# Patient Record
Sex: MALE | Race: WHITE | Employment: OTHER
[De-identification: names, ages, dates, MRNs, and addresses within clinical notes are randomized per-mention and may not be internally consistent; named-entity substitution may affect disease eponyms.]

---

## 2017-01-24 ENCOUNTER — OFFICE VISIT (OUTPATIENT)
Dept: INTERNAL MEDICINE | Facility: CLINIC | Age: 77
End: 2017-01-24

## 2017-01-24 VITALS
BODY MASS INDEX: 29.58 KG/M2 | SYSTOLIC BLOOD PRESSURE: 110 MMHG | DIASTOLIC BLOOD PRESSURE: 68 MMHG | WEIGHT: 223.2 LBS | HEART RATE: 76 BPM | HEIGHT: 73 IN

## 2017-01-24 DIAGNOSIS — Z23 NEED FOR 23-POLYVALENT PNEUMOCOCCAL POLYSACCHARIDE VACCINE: ICD-10-CM

## 2017-01-24 DIAGNOSIS — K21.9 GASTROESOPHAGEAL REFLUX DISEASE WITHOUT ESOPHAGITIS: Primary | ICD-10-CM

## 2017-01-24 DIAGNOSIS — D22.9 MULTIPLE ATYPICAL NEVI: ICD-10-CM

## 2017-01-24 DIAGNOSIS — J44.9 CHRONIC OBSTRUCTIVE PULMONARY DISEASE, UNSPECIFIED COPD TYPE (HCC): ICD-10-CM

## 2017-01-24 PROCEDURE — 99214 OFFICE O/P EST MOD 30 MIN: CPT | Performed by: NURSE PRACTITIONER

## 2017-01-24 PROCEDURE — G8482 FLU IMMUNIZE ORDER/ADMIN: HCPCS | Performed by: NURSE PRACTITIONER

## 2017-01-24 PROCEDURE — 4040F PNEUMOC VAC/ADMIN/RCVD: CPT | Performed by: NURSE PRACTITIONER

## 2017-01-24 PROCEDURE — 1123F ACP DISCUSS/DSCN MKR DOCD: CPT | Performed by: NURSE PRACTITIONER

## 2017-01-24 PROCEDURE — G8926 SPIRO NO PERF OR DOC: HCPCS | Performed by: NURSE PRACTITIONER

## 2017-01-24 PROCEDURE — 3023F SPIROM DOC REV: CPT | Performed by: NURSE PRACTITIONER

## 2017-01-24 PROCEDURE — 1036F TOBACCO NON-USER: CPT | Performed by: NURSE PRACTITIONER

## 2017-01-24 PROCEDURE — G0009 ADMIN PNEUMOCOCCAL VACCINE: HCPCS | Performed by: NURSE PRACTITIONER

## 2017-01-24 PROCEDURE — G8420 CALC BMI NORM PARAMETERS: HCPCS | Performed by: NURSE PRACTITIONER

## 2017-01-24 PROCEDURE — G8427 DOCREV CUR MEDS BY ELIG CLIN: HCPCS | Performed by: NURSE PRACTITIONER

## 2017-01-24 PROCEDURE — 3017F COLORECTAL CA SCREEN DOC REV: CPT | Performed by: NURSE PRACTITIONER

## 2017-01-24 PROCEDURE — 90732 PPSV23 VACC 2 YRS+ SUBQ/IM: CPT | Performed by: NURSE PRACTITIONER

## 2017-01-24 RX ORDER — ALBUTEROL SULFATE 90 UG/1
2 AEROSOL, METERED RESPIRATORY (INHALATION) EVERY 6 HOURS PRN
Qty: 1 INHALER | Refills: 5 | Status: SHIPPED | OUTPATIENT
Start: 2017-01-24

## 2017-01-24 ASSESSMENT — ENCOUNTER SYMPTOMS
TROUBLE SWALLOWING: 0
ABDOMINAL PAIN: 0
CONSTIPATION: 0
SORE THROAT: 0
NAUSEA: 0
SINUS PRESSURE: 0
GLOBUS SENSATION: 0
COUGH: 1
VOMITING: 0
BLOOD IN STOOL: 0
DIARRHEA: 0
SHORTNESS OF BREATH: 0
WHEEZING: 0

## 2017-01-24 ASSESSMENT — PATIENT HEALTH QUESTIONNAIRE - PHQ9
SUM OF ALL RESPONSES TO PHQ9 QUESTIONS 1 & 2: 0
2. FEELING DOWN, DEPRESSED OR HOPELESS: 0
SUM OF ALL RESPONSES TO PHQ QUESTIONS 1-9: 0
1. LITTLE INTEREST OR PLEASURE IN DOING THINGS: 0

## 2017-02-14 DIAGNOSIS — K21.9 GASTROESOPHAGEAL REFLUX DISEASE, ESOPHAGITIS PRESENCE NOT SPECIFIED: ICD-10-CM

## 2017-02-14 RX ORDER — OMEPRAZOLE 20 MG/1
CAPSULE, DELAYED RELEASE ORAL
Qty: 60 CAPSULE | Refills: 3 | Status: SHIPPED | OUTPATIENT
Start: 2017-02-14 | End: 2017-05-26 | Stop reason: SDUPTHER

## 2017-02-15 ENCOUNTER — OFFICE VISIT (OUTPATIENT)
Dept: INTERNAL MEDICINE | Facility: CLINIC | Age: 77
End: 2017-02-15

## 2017-02-15 VITALS
TEMPERATURE: 98 F | BODY MASS INDEX: 29.55 KG/M2 | DIASTOLIC BLOOD PRESSURE: 86 MMHG | SYSTOLIC BLOOD PRESSURE: 136 MMHG | HEART RATE: 80 BPM | WEIGHT: 223 LBS | HEIGHT: 73 IN | RESPIRATION RATE: 18 BRPM

## 2017-02-15 DIAGNOSIS — J06.9 UPPER RESPIRATORY TRACT INFECTION, UNSPECIFIED TYPE: Primary | ICD-10-CM

## 2017-02-15 PROCEDURE — 4040F PNEUMOC VAC/ADMIN/RCVD: CPT | Performed by: NURSE PRACTITIONER

## 2017-02-15 PROCEDURE — G8427 DOCREV CUR MEDS BY ELIG CLIN: HCPCS | Performed by: NURSE PRACTITIONER

## 2017-02-15 PROCEDURE — 99213 OFFICE O/P EST LOW 20 MIN: CPT | Performed by: NURSE PRACTITIONER

## 2017-02-15 PROCEDURE — 1036F TOBACCO NON-USER: CPT | Performed by: NURSE PRACTITIONER

## 2017-02-15 PROCEDURE — 1123F ACP DISCUSS/DSCN MKR DOCD: CPT | Performed by: NURSE PRACTITIONER

## 2017-02-15 PROCEDURE — G8420 CALC BMI NORM PARAMETERS: HCPCS | Performed by: NURSE PRACTITIONER

## 2017-02-15 PROCEDURE — 3017F COLORECTAL CA SCREEN DOC REV: CPT | Performed by: NURSE PRACTITIONER

## 2017-02-15 PROCEDURE — G8482 FLU IMMUNIZE ORDER/ADMIN: HCPCS | Performed by: NURSE PRACTITIONER

## 2017-02-15 RX ORDER — AZITHROMYCIN 250 MG/1
TABLET, FILM COATED ORAL
Qty: 11 TABLET | Refills: 0 | Status: SHIPPED | OUTPATIENT
Start: 2017-02-15 | End: 2017-02-25

## 2017-02-15 RX ORDER — BENZONATATE 100 MG/1
100 CAPSULE ORAL 3 TIMES DAILY PRN
Qty: 60 CAPSULE | Refills: 0 | Status: SHIPPED | OUTPATIENT
Start: 2017-02-15 | End: 2017-02-22

## 2017-02-15 ASSESSMENT — PATIENT HEALTH QUESTIONNAIRE - PHQ9
SUM OF ALL RESPONSES TO PHQ QUESTIONS 1-9: 0
SUM OF ALL RESPONSES TO PHQ9 QUESTIONS 1 & 2: 0
1. LITTLE INTEREST OR PLEASURE IN DOING THINGS: 0
2. FEELING DOWN, DEPRESSED OR HOPELESS: 0

## 2017-02-15 ASSESSMENT — ENCOUNTER SYMPTOMS
NAUSEA: 0
BACK PAIN: 0
SORE THROAT: 1
DIARRHEA: 1
VOMITING: 0
COUGH: 1
ABDOMINAL PAIN: 0
SHORTNESS OF BREATH: 0
SINUS PAIN: 1
SWOLLEN GLANDS: 0
WHEEZING: 0
RHINORRHEA: 0

## 2017-03-24 ENCOUNTER — HOSPITAL ENCOUNTER (EMERGENCY)
Age: 77
Discharge: HOME | End: 2017-03-24
Attending: EMERGENCY MEDICINE
Payer: MEDICARE

## 2017-03-24 VITALS
WEIGHT: 225 LBS | TEMPERATURE: 97.9 F | HEART RATE: 78 BPM | HEIGHT: 73 IN | OXYGEN SATURATION: 95 % | RESPIRATION RATE: 18 BRPM | BODY MASS INDEX: 29.82 KG/M2 | DIASTOLIC BLOOD PRESSURE: 89 MMHG | SYSTOLIC BLOOD PRESSURE: 151 MMHG

## 2017-03-24 DIAGNOSIS — S05.02XD CORNEAL ABRASION, LEFT, SUBSEQUENT ENCOUNTER: Primary | ICD-10-CM

## 2017-03-24 PROCEDURE — 6370000000 HC RX 637 (ALT 250 FOR IP): Performed by: EMERGENCY MEDICINE

## 2017-03-24 PROCEDURE — 99282 EMERGENCY DEPT VISIT SF MDM: CPT

## 2017-03-24 RX ORDER — TERAZOSIN 1 MG/1
1 CAPSULE ORAL NIGHTLY
COMMUNITY
End: 2017-09-29

## 2017-03-24 RX ORDER — TETRACAINE HYDROCHLORIDE 5 MG/ML
2 SOLUTION OPHTHALMIC ONCE
Status: COMPLETED | OUTPATIENT
Start: 2017-03-24 | End: 2017-03-24

## 2017-03-24 RX ORDER — HYDROCODONE BITARTRATE AND ACETAMINOPHEN 5; 325 MG/1; MG/1
2 TABLET ORAL ONCE
Status: COMPLETED | OUTPATIENT
Start: 2017-03-24 | End: 2017-03-24

## 2017-03-24 RX ORDER — TOBRAMYCIN AND DEXAMETHASONE 3; 1 MG/ML; MG/ML
1 SUSPENSION/ DROPS OPHTHALMIC
COMMUNITY
End: 2017-09-29 | Stop reason: ALTCHOICE

## 2017-03-24 RX ORDER — AZITHROMYCIN 250 MG/1
250 TABLET, FILM COATED ORAL DAILY
COMMUNITY
End: 2017-05-26 | Stop reason: ALTCHOICE

## 2017-03-24 RX ORDER — MULTIVIT WITH MINERALS/LUTEIN
500 TABLET ORAL DAILY
COMMUNITY
End: 2021-07-09

## 2017-03-24 RX ORDER — HYDROCODONE BITARTRATE AND ACETAMINOPHEN 5; 325 MG/1; MG/1
1 TABLET ORAL EVERY 6 HOURS PRN
Qty: 20 TABLET | Refills: 0 | Status: SHIPPED | OUTPATIENT
Start: 2017-03-24 | End: 2017-03-31

## 2017-03-24 RX ADMIN — HYDROCODONE BITARTRATE AND ACETAMINOPHEN 2 TABLET: 5; 325 TABLET ORAL at 01:43

## 2017-03-24 RX ADMIN — HYDROCODONE BITARTRATE AND ACETAMINOPHEN 2 TABLET: 5; 325 TABLET ORAL at 01:42

## 2017-03-24 RX ADMIN — TETRACAINE HYDROCHLORIDE 2 DROP: 5 SOLUTION OPHTHALMIC at 01:16

## 2017-03-24 ASSESSMENT — ENCOUNTER SYMPTOMS
PHOTOPHOBIA: 1
COUGH: 0
SHORTNESS OF BREATH: 0
DIARRHEA: 0
VOMITING: 0
SORE THROAT: 0
EYE PAIN: 1
ABDOMINAL PAIN: 0
NAUSEA: 0
EYE DISCHARGE: 0

## 2017-03-24 ASSESSMENT — PAIN DESCRIPTION - ORIENTATION
ORIENTATION: RIGHT;LEFT
ORIENTATION: RIGHT;LEFT

## 2017-03-24 ASSESSMENT — PAIN SCALES - GENERAL
PAINLEVEL_OUTOF10: 5
PAINLEVEL_OUTOF10: 10
PAINLEVEL_OUTOF10: 5

## 2017-03-24 ASSESSMENT — PAIN DESCRIPTION - LOCATION
LOCATION: EYE
LOCATION: EYE

## 2017-05-26 ENCOUNTER — OFFICE VISIT (OUTPATIENT)
Dept: PRIMARY CARE CLINIC | Age: 77
End: 2017-05-26
Payer: MEDICARE

## 2017-05-26 VITALS
WEIGHT: 224.6 LBS | HEART RATE: 60 BPM | HEIGHT: 73 IN | SYSTOLIC BLOOD PRESSURE: 114 MMHG | DIASTOLIC BLOOD PRESSURE: 68 MMHG | RESPIRATION RATE: 18 BRPM | BODY MASS INDEX: 29.77 KG/M2

## 2017-05-26 DIAGNOSIS — E78.2 MIXED HYPERLIPIDEMIA: ICD-10-CM

## 2017-05-26 DIAGNOSIS — K21.9 GASTROESOPHAGEAL REFLUX DISEASE, ESOPHAGITIS PRESENCE NOT SPECIFIED: Primary | ICD-10-CM

## 2017-05-26 PROCEDURE — 1123F ACP DISCUSS/DSCN MKR DOCD: CPT | Performed by: NURSE PRACTITIONER

## 2017-05-26 PROCEDURE — 4040F PNEUMOC VAC/ADMIN/RCVD: CPT | Performed by: NURSE PRACTITIONER

## 2017-05-26 PROCEDURE — G8427 DOCREV CUR MEDS BY ELIG CLIN: HCPCS | Performed by: NURSE PRACTITIONER

## 2017-05-26 PROCEDURE — G8420 CALC BMI NORM PARAMETERS: HCPCS | Performed by: NURSE PRACTITIONER

## 2017-05-26 PROCEDURE — 99214 OFFICE O/P EST MOD 30 MIN: CPT | Performed by: NURSE PRACTITIONER

## 2017-05-26 PROCEDURE — 3017F COLORECTAL CA SCREEN DOC REV: CPT | Performed by: NURSE PRACTITIONER

## 2017-05-26 PROCEDURE — 1036F TOBACCO NON-USER: CPT | Performed by: NURSE PRACTITIONER

## 2017-05-26 RX ORDER — OMEPRAZOLE 20 MG/1
CAPSULE, DELAYED RELEASE ORAL
Qty: 60 CAPSULE | Refills: 3 | Status: SHIPPED | OUTPATIENT
Start: 2017-05-26 | End: 2019-03-15

## 2017-05-26 ASSESSMENT — ENCOUNTER SYMPTOMS
WHEEZING: 0
CONSTIPATION: 0
TROUBLE SWALLOWING: 0
COUGH: 0
VOMITING: 0
SORE THROAT: 0
SHORTNESS OF BREATH: 0
NAUSEA: 0
DIARRHEA: 0
BLOOD IN STOOL: 0
SINUS PRESSURE: 0
ABDOMINAL PAIN: 0

## 2017-09-29 ENCOUNTER — OFFICE VISIT (OUTPATIENT)
Dept: PRIMARY CARE CLINIC | Age: 77
End: 2017-09-29
Payer: MEDICARE

## 2017-09-29 VITALS
WEIGHT: 229.4 LBS | BODY MASS INDEX: 30.4 KG/M2 | SYSTOLIC BLOOD PRESSURE: 104 MMHG | HEIGHT: 73 IN | HEART RATE: 68 BPM | RESPIRATION RATE: 18 BRPM | DIASTOLIC BLOOD PRESSURE: 70 MMHG

## 2017-09-29 DIAGNOSIS — K21.9 GASTROESOPHAGEAL REFLUX DISEASE WITHOUT ESOPHAGITIS: Primary | ICD-10-CM

## 2017-09-29 DIAGNOSIS — E78.2 MIXED HYPERLIPIDEMIA: ICD-10-CM

## 2017-09-29 DIAGNOSIS — Z23 NEED FOR INFLUENZA VACCINATION: ICD-10-CM

## 2017-09-29 DIAGNOSIS — Z13.0 SCREENING, ANEMIA, DEFICIENCY, IRON: ICD-10-CM

## 2017-09-29 DIAGNOSIS — M25.562 ACUTE PAIN OF LEFT KNEE: ICD-10-CM

## 2017-09-29 PROCEDURE — G0008 ADMIN INFLUENZA VIRUS VAC: HCPCS | Performed by: NURSE PRACTITIONER

## 2017-09-29 PROCEDURE — G8417 CALC BMI ABV UP PARAM F/U: HCPCS | Performed by: NURSE PRACTITIONER

## 2017-09-29 PROCEDURE — 90688 IIV4 VACCINE SPLT 0.5 ML IM: CPT | Performed by: NURSE PRACTITIONER

## 2017-09-29 PROCEDURE — 4040F PNEUMOC VAC/ADMIN/RCVD: CPT | Performed by: NURSE PRACTITIONER

## 2017-09-29 PROCEDURE — G8427 DOCREV CUR MEDS BY ELIG CLIN: HCPCS | Performed by: NURSE PRACTITIONER

## 2017-09-29 PROCEDURE — 1036F TOBACCO NON-USER: CPT | Performed by: NURSE PRACTITIONER

## 2017-09-29 PROCEDURE — 99214 OFFICE O/P EST MOD 30 MIN: CPT | Performed by: NURSE PRACTITIONER

## 2017-09-29 PROCEDURE — 3017F COLORECTAL CA SCREEN DOC REV: CPT | Performed by: NURSE PRACTITIONER

## 2017-09-29 PROCEDURE — 1123F ACP DISCUSS/DSCN MKR DOCD: CPT | Performed by: NURSE PRACTITIONER

## 2017-09-29 ASSESSMENT — ENCOUNTER SYMPTOMS
SINUS PRESSURE: 0
SORE THROAT: 0
BLOOD IN STOOL: 0
COUGH: 0
TROUBLE SWALLOWING: 0
NAUSEA: 0
ABDOMINAL PAIN: 0
DIARRHEA: 0
VOMITING: 0
SHORTNESS OF BREATH: 0
CONSTIPATION: 0
WHEEZING: 0

## 2017-10-19 ENCOUNTER — OFFICE VISIT (OUTPATIENT)
Dept: PRIMARY CARE CLINIC | Age: 77
End: 2017-10-19
Payer: MEDICARE

## 2017-10-19 ENCOUNTER — TELEPHONE (OUTPATIENT)
Dept: PRIMARY CARE CLINIC | Age: 77
End: 2017-10-19

## 2017-10-19 VITALS
HEART RATE: 64 BPM | WEIGHT: 229 LBS | TEMPERATURE: 97.7 F | OXYGEN SATURATION: 95 % | HEIGHT: 73 IN | DIASTOLIC BLOOD PRESSURE: 74 MMHG | SYSTOLIC BLOOD PRESSURE: 116 MMHG | RESPIRATION RATE: 16 BRPM | BODY MASS INDEX: 30.35 KG/M2

## 2017-10-19 DIAGNOSIS — J06.9 ACUTE URI: Primary | ICD-10-CM

## 2017-10-19 PROCEDURE — G8484 FLU IMMUNIZE NO ADMIN: HCPCS | Performed by: INTERNAL MEDICINE

## 2017-10-19 PROCEDURE — 1123F ACP DISCUSS/DSCN MKR DOCD: CPT | Performed by: INTERNAL MEDICINE

## 2017-10-19 PROCEDURE — G8417 CALC BMI ABV UP PARAM F/U: HCPCS | Performed by: INTERNAL MEDICINE

## 2017-10-19 PROCEDURE — 1036F TOBACCO NON-USER: CPT | Performed by: INTERNAL MEDICINE

## 2017-10-19 PROCEDURE — 99213 OFFICE O/P EST LOW 20 MIN: CPT | Performed by: INTERNAL MEDICINE

## 2017-10-19 PROCEDURE — 4040F PNEUMOC VAC/ADMIN/RCVD: CPT | Performed by: INTERNAL MEDICINE

## 2017-10-19 PROCEDURE — 3017F COLORECTAL CA SCREEN DOC REV: CPT | Performed by: INTERNAL MEDICINE

## 2017-10-19 PROCEDURE — G8427 DOCREV CUR MEDS BY ELIG CLIN: HCPCS | Performed by: INTERNAL MEDICINE

## 2017-10-19 RX ORDER — IBUPROFEN 400 MG/1
400 TABLET ORAL 3 TIMES DAILY
Qty: 30 TABLET | Refills: 1 | Status: ON HOLD | OUTPATIENT
Start: 2017-10-19 | End: 2021-08-13

## 2017-10-19 RX ORDER — AZITHROMYCIN 500 MG/1
500 TABLET, FILM COATED ORAL DAILY
Qty: 6 TABLET | Refills: 0 | Status: SHIPPED | OUTPATIENT
Start: 2017-10-19 | End: 2017-10-24

## 2017-10-19 RX ORDER — PREDNISONE 1 MG/1
5 TABLET ORAL DAILY
Qty: 12 TABLET | Refills: 0 | Status: SHIPPED | OUTPATIENT
Start: 2017-10-19 | End: 2018-01-13 | Stop reason: ALTCHOICE

## 2017-10-19 ASSESSMENT — ENCOUNTER SYMPTOMS
ABDOMINAL PAIN: 0
SORE THROAT: 0
EYE ITCHING: 0
EYE REDNESS: 0
EYE PAIN: 0
SINUS PRESSURE: 0
RHINORRHEA: 0
NAUSEA: 0
APNEA: 0
SHORTNESS OF BREATH: 1
VOICE CHANGE: 1
CHOKING: 0
SINUS PAIN: 0
COUGH: 1
WHEEZING: 1
CHEST TIGHTNESS: 0
EYE DISCHARGE: 0

## 2017-10-19 NOTE — PROGRESS NOTES
[facility] PHYSICIANS  [facility] INTERNAL MEDICINE  [address]  [address]  [place] [address]  Dept: [phone]  Dept Fax: [phone]      Shonna David is a 78 y.o. male who presents today for his medical conditions/complaints as noted below. Chief Complaint   Patient presents with    URI     x 7 days    Cough     x7days Greenish phlegm     Congestion     x7days       HPI:     Subjective:     Shonna David is a 78 y.o. male who presents for evaluation of right ear congestion. Symptoms began 7 days ago. Symptoms have been unchanged since that time. Past history is significant for nothing. .              Current Outpatient Prescriptions   Medication Sig Dispense Refill    azithromycin (ZITHROMAX) 500 MG tablet Take 1 tablet by mouth daily for 5 days Take 2 tablets by mouth on day 1, then 1 tablet by mouth daily for days 2-5. 6 tablet 0    predniSONE (DELTASONE) 5 MG tablet Take 1 tablet by mouth daily for 12 days Take one tablet two times a day x 3 days then one tablet once a day x 3 days then one tablet every other day x 3 and discontinue.  12 tablet 0    ibuprofen (ADVIL;MOTRIN) 400 MG tablet Take 1 tablet by mouth 3 times daily 30 tablet 1    omeprazole (PRILOSEC) 20 MG delayed release capsule TAKE 1 CAPSULE TWICE A DAY 60 capsule 3    Ascorbic Acid (VITAMIN C) 250 MG tablet Take 500 mg by mouth daily      albuterol sulfate HFA (PROAIR HFA) 108 (90 BASE) MCG/ACT inhaler Inhale 2 puffs into the lungs every 6 hours as needed for Wheezing 1 Inhaler 5    oxybutynin (DITROPAN-XL) 10 MG extended release tablet Take 1 tablet by mouth daily 30 tablet 5    b complex vitamins capsule Take 1 capsule by mouth daily      Glucos-Chond-Hyal Ac-Ca Fructo (MOVE FREE JOINT HEALTH ADVANCE) TABS Take by mouth      ranitidine (ZANTAC) 150 MG tablet Take 150 mg by mouth daily as needed for Heartburn      aspirin 81 MG tablet Take 81 mg by mouth daily      fluticasone (FLONASE) 50 MCG/ACT nasal spray 1 spray by Nasal route daily      coenzyme Q10 100 MG CAPS capsule Take 100 mg by mouth daily      simvastatin (ZOCOR) 20 MG tablet Take 1 tablet by mouth nightly 90 tablet 3     No current facility-administered medications for this visit. No Known Allergies    Past Medical History:   Diagnosis Date    COPD (chronic obstructive pulmonary disease) (HCC)     Diverticulosis     Gastritis     GERD (gastroesophageal reflux disease)     Hyperlipidemia     Multiple lung nodules     Prostate cancer ([facility] [address]     Seasonal allergies      Past Surgical History:   Procedure Laterality Date    COLONOSCOPY      done about 10 years ago    COLONOSCOPY  11/10/2016    severe diverticulosis     CYST REMOVAL      groin, left arm    ENDOSCOPY, COLON, DIAGNOSTIC      HEMORRHOID SURGERY      LUNG BIOPSY      left side    OTHER SURGICAL HISTORY      prostate seeding    UPPER GASTROINTESTINAL ENDOSCOPY      done about 10 years ago    UPPER GASTROINTESTINAL ENDOSCOPY  11/10/2016    duodenal bulb polyp, MODERATE GASTRITIS      Social History     Social History    Marital status:      Spouse name: N/A    Number of children: N/A    Years of education: N/A     Occupational History    Not on file. Social History Main Topics    Smoking status: Former Smoker     Years: 20.00    Smokeless tobacco: Never Used      Comment: stopped smoking 20 years    Alcohol use Yes      Comment: social    Drug use: No    Sexual activity: Not on file     Other Topics Concern    Not on file     Social History Narrative    No narrative on file     Family History   Problem Relation Age of Onset    Cancer Mother     Cancer Father      colon       Subjective:      Review of Systems   Constitutional: Positive for fatigue. Negative for activity change, appetite change, chills, diaphoresis and fever. HENT: Positive for congestion, ear pain, hearing loss, sneezing and voice change.  Negative for ear discharge, mouth sores, nosebleeds, postnasal drip, rhinorrhea, sinus pain, sinus pressure and sore throat. Eyes: Negative for pain, discharge, redness and itching. Respiratory: Positive for cough, shortness of breath and wheezing. Negative for apnea, choking and chest tightness. Cardiovascular: Negative for chest pain, palpitations and leg swelling. Gastrointestinal: Negative for abdominal pain and nausea. Musculoskeletal: Positive for arthralgias and myalgias. Skin: Negative for rash. Neurological: Negative for dizziness and weakness. Hematological: Negative for adenopathy. Psychiatric/Behavioral: Positive for sleep disturbance. Objective:     /74   Pulse 64   Temp 97.7 °F (36.5 °C)   Resp 16   Ht 6' 1\" (1.854 m)   Wt 229 lb (103.9 kg)   SpO2 95%   BMI 30.21 kg/m²     Physical Exam   Constitutional: He is oriented to person, place, and time. He appears distressed. HENT:   Head: Normocephalic and atraumatic. Right Ear: External ear normal.   Left Ear: External ear normal.   Mouth/Throat: Oropharynx is clear and moist. No oropharyngeal exudate. Eyes: Conjunctivae and EOM are normal. Pupils are equal, round, and reactive to light. No scleral icterus. Neck: Normal range of motion. Neck supple. Cardiovascular: Normal rate, regular rhythm and normal pulses. Pulmonary/Chest: Effort normal. No respiratory distress. He has wheezes. On examination, Inspection of the chest showed the chest wall to be symmetrical and equal in expansion. Palpation did not show any masses or tenderness over the chest wall. Percussion note is normal Auscultation showed scattered wheezes no rales. No pleural friction rub. Abdominal: Soft. He exhibits no distension. There is no tenderness. Musculoskeletal: He exhibits no edema. Neurological: He is alert and oriented to person, place, and time. Gait normal. Coordination normal.   Skin: Skin is warm, dry and intact. No rash noted.    Psychiatric: Mood, memory and affect normal.   Nursing note and vitals reviewed. Assessment:       1. Acute URI  See orders below. Antibiotics, Prednisone, NSAIDS. Plan:       Orders Placed This Encounter   Medications    azithromycin (ZITHROMAX) 500 MG tablet     Sig: Take 1 tablet by mouth daily for 5 days Take 2 tablets by mouth on day 1, then 1 tablet by mouth daily for days 2-5. Dispense:  6 tablet     Refill:  0    predniSONE (DELTASONE) 5 MG tablet     Sig: Take 1 tablet by mouth daily for 12 days Take one tablet two times a day x 3 days then one tablet once a day x 3 days then one tablet every other day x 3 and discontinue. Dispense:  12 tablet     Refill:  0    ibuprofen (ADVIL;MOTRIN) 400 MG tablet     Sig: Take 1 tablet by mouth 3 times daily     Dispense:  30 tablet     Refill:  1     We will see how he does with above. He had questions if 5 days of Rx can really help. He was reassured.        Electronically signed by Aurelio Chan MD on 10/19/2017 at 1:03 PM

## 2017-10-31 ENCOUNTER — OFFICE VISIT (OUTPATIENT)
Dept: PRIMARY CARE CLINIC | Age: 77
End: 2017-10-31
Payer: MEDICARE

## 2017-10-31 VITALS
DIASTOLIC BLOOD PRESSURE: 68 MMHG | TEMPERATURE: 98.1 F | WEIGHT: 224 LBS | OXYGEN SATURATION: 97 % | SYSTOLIC BLOOD PRESSURE: 110 MMHG | HEART RATE: 91 BPM | HEIGHT: 73 IN | RESPIRATION RATE: 16 BRPM | BODY MASS INDEX: 29.69 KG/M2

## 2017-10-31 DIAGNOSIS — R05.9 COUGH: ICD-10-CM

## 2017-10-31 DIAGNOSIS — J20.8 ACUTE BRONCHITIS DUE TO OTHER SPECIFIED ORGANISMS: Primary | ICD-10-CM

## 2017-10-31 LAB
ALBUMIN SERPL-MCNC: 4.4 G/DL
ALP BLD-CCNC: 66 U/L
ALT SERPL-CCNC: 15 U/L
ANION GAP SERPL CALCULATED.3IONS-SCNC: NORMAL MMOL/L
AST SERPL-CCNC: 19 U/L
BASOPHILS ABSOLUTE: NORMAL /ΜL
BASOPHILS RELATIVE PERCENT: NORMAL %
BILIRUB SERPL-MCNC: 1.2 MG/DL (ref 0.1–1.4)
BUN BLDV-MCNC: 13 MG/DL
CALCIUM SERPL-MCNC: 9.3 MG/DL
CHLORIDE BLD-SCNC: 106 MMOL/L
CHOLESTEROL, TOTAL: 204 MG/DL
CHOLESTEROL/HDL RATIO: 5.7
CO2: 25 MMOL/L
CREAT SERPL-MCNC: 0.98 MG/DL
EOSINOPHILS ABSOLUTE: NORMAL /ΜL
EOSINOPHILS RELATIVE PERCENT: NORMAL %
GFR CALCULATED: NORMAL
GLUCOSE BLD-MCNC: 101 MG/DL
HCT VFR BLD CALC: 45.1 % (ref 41–53)
HDLC SERPL-MCNC: 36 MG/DL (ref 35–70)
HEMOGLOBIN: 15.2 G/DL (ref 13.5–17.5)
LDL CHOLESTEROL CALCULATED: 153 MG/DL (ref 0–160)
LYMPHOCYTES ABSOLUTE: NORMAL /ΜL
LYMPHOCYTES RELATIVE PERCENT: NORMAL %
MCH RBC QN AUTO: NORMAL PG
MCHC RBC AUTO-ENTMCNC: NORMAL G/DL
MCV RBC AUTO: NORMAL FL
MONOCYTES ABSOLUTE: NORMAL /ΜL
MONOCYTES RELATIVE PERCENT: NORMAL %
NEUTROPHILS ABSOLUTE: NORMAL /ΜL
NEUTROPHILS RELATIVE PERCENT: NORMAL %
PDW BLD-RTO: NORMAL %
PLATELET # BLD: 222 K/ΜL
PMV BLD AUTO: NORMAL FL
POTASSIUM SERPL-SCNC: 4.3 MMOL/L
RBC # BLD: 5.01 10^6/ΜL
SODIUM BLD-SCNC: 139 MMOL/L
TOTAL PROTEIN: 7.6
TRIGL SERPL-MCNC: 75 MG/DL
VLDLC SERPL CALC-MCNC: NORMAL MG/DL
WBC # BLD: 6.1 10^3/ML

## 2017-10-31 PROCEDURE — 99213 OFFICE O/P EST LOW 20 MIN: CPT | Performed by: INTERNAL MEDICINE

## 2017-10-31 PROCEDURE — G8484 FLU IMMUNIZE NO ADMIN: HCPCS | Performed by: INTERNAL MEDICINE

## 2017-10-31 PROCEDURE — G8417 CALC BMI ABV UP PARAM F/U: HCPCS | Performed by: INTERNAL MEDICINE

## 2017-10-31 PROCEDURE — 3017F COLORECTAL CA SCREEN DOC REV: CPT | Performed by: INTERNAL MEDICINE

## 2017-10-31 PROCEDURE — 4040F PNEUMOC VAC/ADMIN/RCVD: CPT | Performed by: INTERNAL MEDICINE

## 2017-10-31 PROCEDURE — 1036F TOBACCO NON-USER: CPT | Performed by: INTERNAL MEDICINE

## 2017-10-31 PROCEDURE — 1123F ACP DISCUSS/DSCN MKR DOCD: CPT | Performed by: INTERNAL MEDICINE

## 2017-10-31 PROCEDURE — G8427 DOCREV CUR MEDS BY ELIG CLIN: HCPCS | Performed by: INTERNAL MEDICINE

## 2017-10-31 RX ORDER — AMOXICILLIN AND CLAVULANATE POTASSIUM 875; 125 MG/1; MG/1
1 TABLET, FILM COATED ORAL 2 TIMES DAILY
Qty: 20 TABLET | Refills: 0 | Status: SHIPPED | OUTPATIENT
Start: 2017-10-31 | End: 2017-11-10

## 2017-10-31 RX ORDER — BENZONATATE 100 MG/1
100 CAPSULE ORAL 3 TIMES DAILY PRN
Qty: 30 CAPSULE | Refills: 1 | Status: SHIPPED | OUTPATIENT
Start: 2017-10-31 | End: 2019-03-15 | Stop reason: ALTCHOICE

## 2017-11-01 DIAGNOSIS — Z13.0 SCREENING, ANEMIA, DEFICIENCY, IRON: ICD-10-CM

## 2017-11-01 DIAGNOSIS — E78.2 MIXED HYPERLIPIDEMIA: ICD-10-CM

## 2017-11-01 ASSESSMENT — ENCOUNTER SYMPTOMS
EYE REDNESS: 0
TROUBLE SWALLOWING: 0
COUGH: 1
SHORTNESS OF BREATH: 0
EYE DISCHARGE: 0
ABDOMINAL PAIN: 0
SORE THROAT: 1
VOMITING: 0
NAUSEA: 0
BACK PAIN: 0

## 2017-11-01 NOTE — PROGRESS NOTES
[facility] PHYSICIANS  [facility] INTERNAL MEDICINE  [address]  [address]  [place] [address]  Dept: [phone]  Dept Fax: [phone]    Josiah Cooper is a 78 y.o. male who presents today for his medical conditions/complaints as noted below. Josiah Cooper is c/o of   Chief Complaint   Patient presents with    URI     x 4 weeks-was here 10/17/17    Cough     productive-         HPI:     URI    This is a recurrent problem. The current episode started 1 to 4 weeks ago. The problem has been gradually worsening. There has been no fever. Associated symptoms include congestion, coughing and a sore throat. Pertinent negatives include no abdominal pain, chest pain, dysuria, headaches, nausea, rash or vomiting. Treatments tried: zpack. The treatment provided moderate relief. Cough   Associated symptoms include a sore throat. Pertinent negatives include no chest pain, eye redness, fever, headaches, myalgias, postnasal drip, rash or shortness of breath.        No results found for: LABA1C          ( goal A1C is < 7)   No results found for: LABMICR  LDL Calculated (mg/dL)   Date Value   09/28/2016 101       (goal LDL is <100)   No results found for: AST, ALT, BUN  BP Readings from Last 3 Encounters:   10/31/17 110/68   10/19/17 116/74   09/29/17 104/70          (goal 120/80)    Past Medical History:   Diagnosis Date    COPD (chronic obstructive pulmonary disease) (HCC)     Diverticulosis     Gastritis     GERD (gastroesophageal reflux disease)     Hyperlipidemia     Multiple lung nodules     Prostate cancer ([facility] [address]     Seasonal allergies       Past Surgical History:   Procedure Laterality Date    COLONOSCOPY      done about 10 years ago    COLONOSCOPY  11/10/2016    severe diverticulosis     CYST REMOVAL      groin, left arm    ENDOSCOPY, COLON, DIAGNOSTIC      HEMORRHOID SURGERY      LUNG BIOPSY      left side    OTHER SURGICAL HISTORY      prostate seeding    UPPER GASTROINTESTINAL ENDOSCOPY      done about 10 years ago    UPPER GASTROINTESTINAL ENDOSCOPY  11/10/2016    duodenal bulb polyp, MODERATE GASTRITIS        Family History   Problem Relation Age of Onset    Cancer Mother     Cancer Father      colon       Social History   Substance Use Topics    Smoking status: Former Smoker     Years: 20.00    Smokeless tobacco: Never Used      Comment: stopped smoking 20 years    Alcohol use Yes      Comment: social      Current Outpatient Prescriptions   Medication Sig Dispense Refill    amoxicillin-clavulanate (AUGMENTIN) 875-125 MG per tablet Take 1 tablet by mouth 2 times daily for 10 days 20 tablet 0    benzonatate (TESSALON PERLES) 100 MG capsule Take 1 capsule by mouth 3 times daily as needed for Cough 30 capsule 1    predniSONE (DELTASONE) 5 MG tablet Take 1 tablet by mouth daily for 12 days Take one tablet two times a day x 3 days then one tablet once a day x 3 days then one tablet every other day x 3 and discontinue.  12 tablet 0    ibuprofen (ADVIL;MOTRIN) 400 MG tablet Take 1 tablet by mouth 3 times daily 30 tablet 1    omeprazole (PRILOSEC) 20 MG delayed release capsule TAKE 1 CAPSULE TWICE A DAY 60 capsule 3    Ascorbic Acid (VITAMIN C) 250 MG tablet Take 500 mg by mouth daily      albuterol sulfate HFA (PROAIR HFA) 108 (90 BASE) MCG/ACT inhaler Inhale 2 puffs into the lungs every 6 hours as needed for Wheezing 1 Inhaler 5    oxybutynin (DITROPAN-XL) 10 MG extended release tablet Take 1 tablet by mouth daily 30 tablet 5    b complex vitamins capsule Take 1 capsule by mouth daily      Glucos-Chond-Hyal Ac-Ca Fructo (MOVE FREE [facility] HEALTH ADVANCE) TABS Take by mouth      ranitidine (ZANTAC) 150 MG tablet Take 150 mg by mouth daily as needed for Heartburn      aspirin 81 MG tablet Take 81 mg by mouth daily      fluticasone (FLONASE) 50 MCG/ACT nasal spray 1 spray by Nasal route daily      coenzyme Q10 100 MG CAPS capsule Take 100 mg by mouth daily      simvastatin (ZOCOR) 20 MG tablet Take 1 tablet by mouth nightly 90 tablet 3     No current facility-administered medications for this visit. No Known Allergies    Health Maintenance   Topic Date Due    AAA screen  1940    DTaP/Tdap/Td vaccine (1 - Tdap) 11/05/1961    Pneumococcal low/med risk (2 of 2 - PCV13) 01/24/2018    Lipid screen  09/28/2021    Colon cancer screen colonoscopy  11/09/2021    Zostavax vaccine  Completed    Flu vaccine  Completed       Subjective:      Review of Systems   Constitutional: Negative for activity change, appetite change, fatigue, fever and unexpected weight change. HENT: Positive for congestion and sore throat. Negative for postnasal drip and trouble swallowing. Eyes: Negative for discharge and redness. Respiratory: Positive for cough. Negative for shortness of breath. Cardiovascular: Negative for chest pain and palpitations. Gastrointestinal: Negative for abdominal pain, nausea and vomiting. Endocrine: Negative for cold intolerance and heat intolerance. Genitourinary: Negative for difficulty urinating and dysuria. Musculoskeletal: Negative for arthralgias, back pain and myalgias. Skin: Negative for rash and wound. Neurological: Negative for dizziness and headaches. Hematological: Negative for adenopathy. Psychiatric/Behavioral: Negative for behavioral problems. The patient is not nervous/anxious. Objective:     Physical Exam   Constitutional: He is oriented to person, place, and time. He appears well-developed and well-nourished. No distress. HENT:   Head: Normocephalic and atraumatic. Right Ear: External ear normal.   Left Ear: External ear normal.   Nose: Nose normal.   Mouth/Throat: Oropharynx is clear and moist. No oropharyngeal exudate. Eyes: Conjunctivae are normal. Right eye exhibits no discharge. Left eye exhibits no discharge. No scleral icterus. Neck: Neck supple.    Cardiovascular: Normal rate, regular rhythm and normal heart

## 2017-11-02 ENCOUNTER — TELEPHONE (OUTPATIENT)
Dept: PRIMARY CARE CLINIC | Age: 77
End: 2017-11-02

## 2017-11-02 NOTE — TELEPHONE ENCOUNTER
----- Message from Yamilet Hawley CNP sent at 11/1/2017  3:52 PM EDT -----  Please let patient know all labs WNL thanks

## 2017-12-26 ENCOUNTER — OFFICE VISIT (OUTPATIENT)
Dept: PRIMARY CARE CLINIC | Age: 77
End: 2017-12-26
Payer: MEDICARE

## 2017-12-26 VITALS
TEMPERATURE: 97.5 F | DIASTOLIC BLOOD PRESSURE: 62 MMHG | SYSTOLIC BLOOD PRESSURE: 112 MMHG | WEIGHT: 224 LBS | BODY MASS INDEX: 29.55 KG/M2

## 2017-12-26 DIAGNOSIS — J06.9 VIRAL URI: Primary | ICD-10-CM

## 2017-12-26 PROCEDURE — 99213 OFFICE O/P EST LOW 20 MIN: CPT | Performed by: NURSE PRACTITIONER

## 2017-12-26 RX ORDER — AZITHROMYCIN 250 MG/1
TABLET, FILM COATED ORAL
Qty: 1 PACKET | Refills: 0 | Status: SHIPPED | OUTPATIENT
Start: 2017-12-26 | End: 2018-01-05

## 2017-12-26 ASSESSMENT — ENCOUNTER SYMPTOMS
VOMITING: 0
NAUSEA: 0
SORE THROAT: 0
WHEEZING: 0
ABDOMINAL PAIN: 0
COUGH: 1
SINUS PAIN: 0
SINUS PRESSURE: 0
CHEST TIGHTNESS: 0
DIARRHEA: 0
RHINORRHEA: 1

## 2017-12-26 NOTE — PROGRESS NOTES
of breath and wheezing. Cardiovascular: Negative for chest pain and palpitations. Gastrointestinal: Negative for abdominal pain, diarrhea, nausea and vomiting. Musculoskeletal: Negative for neck pain. Skin: Negative for rash. Neurological: Negative for dizziness, facial asymmetry and light-headedness. Objective:     Physical Exam   Constitutional: He appears well-developed and well-nourished. No distress. HENT:   Head: Normocephalic. Right Ear: Tympanic membrane, external ear and ear canal normal.   Left Ear: Tympanic membrane, external ear and ear canal normal.   Nose: Rhinorrhea present. Right sinus exhibits no maxillary sinus tenderness and no frontal sinus tenderness. Left sinus exhibits no maxillary sinus tenderness and no frontal sinus tenderness. Mouth/Throat: Uvula is midline, oropharynx is clear and moist and mucous membranes are normal.   Cardiovascular: Normal rate, regular rhythm and normal heart sounds. Pulmonary/Chest: Effort normal and breath sounds normal. No respiratory distress. He has no wheezes. Skin: He is not diaphoretic. Nursing note and vitals reviewed. /62 (Site: Left Arm, Position: Sitting, Cuff Size: Large Adult)   Temp 97.5 °F (36.4 °C) (Oral)   Wt 224 lb (101.6 kg)   BMI 29.55 kg/m²     Assessment:      1. Viral URI  azithromycin (ZITHROMAX) 250 MG tablet     Plan:      Return if symptoms worsen or fail to improve. Orders Placed This Encounter   Medications    azithromycin (ZITHROMAX) 250 MG tablet     Sig: Take 2 tabs (500 mg) on Day 1, and take 1 tab (250 mg) on days 2 through 5. Dispense:  1 packet     Refill:  0     Given the patient's presenting symptoms I feel this is most likely a virus. Patient is demanding that an antibiotic needs to be prescribed. Great deal of time was spent discussing viral illness versus bacterial illness and use of antibiotics with a viral illness discussed with patient. Patient verbalizes understanding.

## 2017-12-28 ENCOUNTER — OFFICE VISIT (OUTPATIENT)
Dept: PRIMARY CARE CLINIC | Age: 77
End: 2017-12-28
Payer: MEDICARE

## 2017-12-28 ENCOUNTER — HOSPITAL ENCOUNTER (OUTPATIENT)
Dept: GENERAL RADIOLOGY | Age: 77
Discharge: HOME | End: 2017-12-28
Payer: MEDICARE

## 2017-12-28 ENCOUNTER — HOSPITAL ENCOUNTER (OUTPATIENT)
Age: 77
Discharge: HOME | End: 2017-12-28
Payer: MEDICARE

## 2017-12-28 VITALS
RESPIRATION RATE: 18 BRPM | SYSTOLIC BLOOD PRESSURE: 108 MMHG | DIASTOLIC BLOOD PRESSURE: 70 MMHG | TEMPERATURE: 98.4 F | WEIGHT: 232.4 LBS | HEART RATE: 68 BPM | BODY MASS INDEX: 30.8 KG/M2 | HEIGHT: 73 IN

## 2017-12-28 DIAGNOSIS — J44.9 CHRONIC OBSTRUCTIVE PULMONARY DISEASE, UNSPECIFIED COPD TYPE (HCC): ICD-10-CM

## 2017-12-28 DIAGNOSIS — R05.3 PERSISTENT COUGH: ICD-10-CM

## 2017-12-28 DIAGNOSIS — Z91.09 ENVIRONMENTAL ALLERGIES: ICD-10-CM

## 2017-12-28 DIAGNOSIS — J01.81 OTHER ACUTE RECURRENT SINUSITIS: Primary | ICD-10-CM

## 2017-12-28 DIAGNOSIS — M25.562 ACUTE PAIN OF LEFT KNEE: ICD-10-CM

## 2017-12-28 PROCEDURE — 1123F ACP DISCUSS/DSCN MKR DOCD: CPT | Performed by: NURSE PRACTITIONER

## 2017-12-28 PROCEDURE — G8417 CALC BMI ABV UP PARAM F/U: HCPCS | Performed by: NURSE PRACTITIONER

## 2017-12-28 PROCEDURE — 4040F PNEUMOC VAC/ADMIN/RCVD: CPT | Performed by: NURSE PRACTITIONER

## 2017-12-28 PROCEDURE — G8484 FLU IMMUNIZE NO ADMIN: HCPCS | Performed by: NURSE PRACTITIONER

## 2017-12-28 PROCEDURE — 99214 OFFICE O/P EST MOD 30 MIN: CPT | Performed by: NURSE PRACTITIONER

## 2017-12-28 PROCEDURE — G8427 DOCREV CUR MEDS BY ELIG CLIN: HCPCS | Performed by: NURSE PRACTITIONER

## 2017-12-28 PROCEDURE — G8926 SPIRO NO PERF OR DOC: HCPCS | Performed by: NURSE PRACTITIONER

## 2017-12-28 PROCEDURE — 3017F COLORECTAL CA SCREEN DOC REV: CPT | Performed by: NURSE PRACTITIONER

## 2017-12-28 PROCEDURE — 73560 X-RAY EXAM OF KNEE 1 OR 2: CPT

## 2017-12-28 PROCEDURE — 71020 XR CHEST STANDARD TWO VW: CPT

## 2017-12-28 PROCEDURE — 3023F SPIROM DOC REV: CPT | Performed by: NURSE PRACTITIONER

## 2017-12-28 PROCEDURE — 1036F TOBACCO NON-USER: CPT | Performed by: NURSE PRACTITIONER

## 2017-12-28 RX ORDER — CETIRIZINE HYDROCHLORIDE 10 MG/1
10 TABLET ORAL DAILY
Qty: 30 TABLET | Refills: 1 | Status: SHIPPED | OUTPATIENT
Start: 2017-12-28 | End: 2019-03-15 | Stop reason: ALTCHOICE

## 2017-12-28 RX ORDER — BUDESONIDE AND FORMOTEROL FUMARATE DIHYDRATE 80; 4.5 UG/1; UG/1
2 AEROSOL RESPIRATORY (INHALATION) 2 TIMES DAILY
Qty: 1 INHALER | Refills: 3 | Status: SHIPPED | OUTPATIENT
Start: 2017-12-28 | End: 2019-03-15 | Stop reason: ALTCHOICE

## 2017-12-28 ASSESSMENT — ENCOUNTER SYMPTOMS
SHORTNESS OF BREATH: 0
SINUS PAIN: 0
SINUS PRESSURE: 0
COUGH: 1
HOARSE VOICE: 0
RHINORRHEA: 1

## 2017-12-28 NOTE — PROGRESS NOTES
capsule 3    Ascorbic Acid (VITAMIN C) 250 MG tablet Take 500 mg by mouth daily      albuterol sulfate HFA (PROAIR HFA) 108 (90 BASE) MCG/ACT inhaler Inhale 2 puffs into the lungs every 6 hours as needed for Wheezing 1 Inhaler 5    oxybutynin (DITROPAN-XL) 10 MG extended release tablet Take 1 tablet by mouth daily 30 tablet 5    b complex vitamins capsule Take 1 capsule by mouth daily      Glucos-Chond-Hyal Ac-Ca Fructo ([place]) TABS Take by mouth      ranitidine (ZANTAC) 150 MG tablet Take 150 mg by mouth daily as needed for Heartburn      aspirin 81 MG tablet Take 81 mg by mouth daily      fluticasone (FLONASE) 50 MCG/ACT nasal spray 1 spray by Nasal route daily      coenzyme Q10 100 MG CAPS capsule Take 100 mg by mouth daily      simvastatin (ZOCOR) 20 MG tablet Take 1 tablet by mouth nightly 90 tablet 3    predniSONE (DELTASONE) 5 MG tablet Take 1 tablet by mouth daily for 12 days Take one tablet two times a day x 3 days then one tablet once a day x 3 days then one tablet every other day x 3 and discontinue. 12 tablet 0     No current facility-administered medications for this visit. No Known Allergies    Health Maintenance   Topic Date Due    AAA screen  1940    DTaP/Tdap/Td vaccine (1 - Tdap) 11/05/1961    Pneumococcal low/med risk (2 of 2 - PCV13) 01/24/2018    Colon cancer screen colonoscopy  11/09/2021    Lipid screen  10/31/2022    Zostavax vaccine  Completed    Flu vaccine  Completed       Subjective:      Review of Systems   HENT: Positive for congestion, postnasal drip and rhinorrhea. Negative for ear pain, hoarse voice, sinus pain and sinus pressure. Respiratory: Positive for cough. Musculoskeletal: Positive for arthralgias and myalgias. Left knee pain   Neurological: Negative for headaches. Objective:     Physical Exam   Constitutional: He is oriented to person, place, and time. He appears well-developed and well-nourished. HENT:   Head: Normocephalic. Right Ear: Tympanic membrane is not erythematous. No middle ear effusion. Left Ear: Tympanic membrane is not erythematous. No middle ear effusion. Nose: Rhinorrhea present. Right sinus exhibits no maxillary sinus tenderness and no frontal sinus tenderness. Left sinus exhibits no maxillary sinus tenderness and no frontal sinus tenderness. Mouth/Throat: No posterior oropharyngeal erythema. Eyes: Conjunctivae and EOM are normal. Pupils are equal, round, and reactive to light. Neck: Normal range of motion. Cardiovascular: Normal rate, regular rhythm, normal heart sounds and intact distal pulses. No murmur heard. Pulmonary/Chest: Effort normal and breath sounds normal. He has no wheezes. Abdominal: Soft. Bowel sounds are normal. He exhibits no distension. Musculoskeletal: Normal range of motion. Left knee: He exhibits swelling and bony tenderness. He exhibits normal range of motion. Mild swelling, tenderness with palpation   Neurological: He is alert and oriented to person, place, and time. Skin: Skin is warm and dry. Psychiatric: He has a normal mood and affect. His behavior is normal. Judgment and thought content normal.     /70 (Site: Left Arm, Position: Sitting, Cuff Size: Large Adult)   Pulse 68   Temp 98.4 °F (36.9 °C)   Resp 18   Ht 6' 1\" (1.854 m)   Wt 232 lb 6.4 oz (105.4 kg)   BMI 30.66 kg/m²     Assessment:      1. Other acute recurrent sinusitis  cetirizine (ZYRTEC) 10 MG tablet   2. Chronic obstructive pulmonary disease, unspecified COPD type (HCC)  XR CHEST STANDARD (2 VW)   3. Acute pain of left knee  [facility] - [place]    CANCELED: XR Knee 2 VW Left   4. Persistent cough  XR CHEST STANDARD (2 VW)    cetirizine (ZYRTEC) 10 MG tablet   5. Environmental allergies  cetirizine (ZYRTEC) 10 MG tablet             Plan:      Return if symptoms worsen or fail to improve.     Orders Placed This Encounter

## 2017-12-29 ENCOUNTER — TELEPHONE (OUTPATIENT)
Dept: PRIMARY CARE CLINIC | Age: 77
End: 2017-12-29

## 2017-12-29 NOTE — TELEPHONE ENCOUNTER
----- Message from Eun Garcia CNP sent at 12/28/2017  6:23 PM EST -----  Please let him know that his chest xray shows COPD. He may benefit from using an inhaler on a daily basis so I have sent in rx for him. Let him know this is in addition to his current rescue inhaler.   Also the knee xray shows moderate arthritis with some fluid around the knee cap  Recommend he proceed with PT evaluation, if this does not help the next step would be orthopedic surgeon

## 2018-01-13 ENCOUNTER — HOSPITAL ENCOUNTER (EMERGENCY)
Age: 78
Discharge: HOME | End: 2018-01-13
Attending: SPECIALIST
Payer: MEDICARE

## 2018-01-13 VITALS
RESPIRATION RATE: 18 BRPM | HEART RATE: 87 BPM | SYSTOLIC BLOOD PRESSURE: 145 MMHG | HEIGHT: 73 IN | BODY MASS INDEX: 30.48 KG/M2 | DIASTOLIC BLOOD PRESSURE: 93 MMHG | WEIGHT: 230 LBS | TEMPERATURE: 97.5 F | OXYGEN SATURATION: 95 %

## 2018-01-13 DIAGNOSIS — S61.411A LACERATION OF RIGHT HAND WITHOUT FOREIGN BODY, INITIAL ENCOUNTER: Primary | ICD-10-CM

## 2018-01-13 PROCEDURE — 2500000003 HC RX 250 WO HCPCS: Performed by: PHYSICIAN ASSISTANT

## 2018-01-13 PROCEDURE — 6370000000 HC RX 637 (ALT 250 FOR IP): Performed by: PHYSICIAN ASSISTANT

## 2018-01-13 PROCEDURE — 90715 TDAP VACCINE 7 YRS/> IM: CPT | Performed by: PHYSICIAN ASSISTANT

## 2018-01-13 PROCEDURE — 6360000002 HC RX W HCPCS: Performed by: PHYSICIAN ASSISTANT

## 2018-01-13 PROCEDURE — 90471 IMMUNIZATION ADMIN: CPT | Performed by: PHYSICIAN ASSISTANT

## 2018-01-13 PROCEDURE — 99282 EMERGENCY DEPT VISIT SF MDM: CPT

## 2018-01-13 PROCEDURE — 12001 RPR S/N/AX/GEN/TRNK 2.5CM/<: CPT

## 2018-01-13 RX ORDER — LIDOCAINE HYDROCHLORIDE AND EPINEPHRINE 10; 10 MG/ML; UG/ML
20 INJECTION, SOLUTION INFILTRATION; PERINEURAL ONCE
Status: COMPLETED | OUTPATIENT
Start: 2018-01-13 | End: 2018-01-13

## 2018-01-13 RX ORDER — GINSENG 100 MG
CAPSULE ORAL ONCE
Status: COMPLETED | OUTPATIENT
Start: 2018-01-13 | End: 2018-01-13

## 2018-01-13 RX ADMIN — LIDOCAINE HYDROCHLORIDE,EPINEPHRINE BITARTRATE 20 ML: 10; .01 INJECTION, SOLUTION INFILTRATION; PERINEURAL at 19:25

## 2018-01-13 RX ADMIN — TETANUS TOXOID, REDUCED DIPHTHERIA TOXOID AND ACELLULAR PERTUSSIS VACCINE, ADSORBED 0.5 ML: 5; 2.5; 8; 8; 2.5 SUSPENSION INTRAMUSCULAR at 19:24

## 2018-01-13 RX ADMIN — BACITRACIN: 500 OINTMENT TOPICAL at 20:00

## 2018-01-13 ASSESSMENT — PAIN SCALES - GENERAL: PAINLEVEL_OUTOF10: 0

## 2018-01-14 NOTE — ED PROVIDER NOTES
[facility] ED  [address] [address]  Phone: [phone]  Fax: [phone]      Attending Physician Attestation    I performed a history and physical examination of the patient and discussed management with the mid level provideer. I reviewed the mid level provider's note and agree with the documented findings and plan of care. Any areas of disagreement are noted on the chart. I was personally present for the key portions of any procedures. I have documented in the chart those procedures where I was not present during the key portions. I have reviewed the emergency nurses triage note. I agree with the chief complaint, past medical history, past surgical history, allergies, medications, social and family history as documented unless otherwise noted below. Documentation of the HPI, Physical Exam and Medical Decision Making performed by mid level providers is based on my personal performance of the HPI, PE and MDM. For Physician Assistant/ Nurse Practitioner cases/documentation I have personally evaluated this patient and have completed at least one if not all key elements of the E/M (history, physical exam, and MDM). Additional findings are as noted. CHIEF COMPLAINT       Chief Complaint   Patient presents with    Laceration     right hand         HISTORY OF PRESENT ILLNESS    Sacha Burroughs is a 78 y.o. male who presents To the emergency department with a laceration on his right hand that he sustained on a snowblower. Patient will need a tetanus shot here tonight the right laceration is approximately 1-1/2 cm in length and is on the dorsum of his right hand. PAST MEDICAL HISTORY    has a past medical history of COPD (chronic obstructive pulmonary disease) ([address] Diverticulosis; Gastritis; GERD (gastroesophageal reflux disease); Hyperlipidemia; Multiple lung nodules; Prostate cancer ([address] and Seasonal allergies.     SURGICAL HISTORY      has a past surgical history that includes Hemorrhoid surgery; Lung biopsy; Colonoscopy; Upper gastrointestinal endoscopy; Endoscopy, colon, diagnostic; cyst removal; other surgical history; Colonoscopy (11/10/2016); and Upper gastrointestinal endoscopy (11/10/2016). CURRENT MEDICATIONS       Previous Medications    ALBUTEROL SULFATE HFA (PROAIR HFA) 108 (90 BASE) MCG/ACT INHALER    Inhale 2 puffs into the lungs every 6 hours as needed for Wheezing    ASCORBIC ACID (VITAMIN C) 250 MG TABLET    Take 500 mg by mouth daily    ASPIRIN 81 MG TABLET    Take 81 mg by mouth daily    B COMPLEX VITAMINS CAPSULE    Take 1 capsule by mouth daily    BENZONATATE (TESSALON PERLES) 100 MG CAPSULE    Take 1 capsule by mouth 3 times daily as needed for Cough    BUDESONIDE-FORMOTEROL (SYMBICORT) 80-4.5 MCG/ACT AERO    Inhale 2 puffs into the lungs 2 times daily    CETIRIZINE (ZYRTEC) 10 MG TABLET    Take 1 tablet by mouth daily    COENZYME Q10 100 MG CAPS CAPSULE    Take 100 mg by mouth daily    FLUTICASONE (FLONASE) 50 MCG/ACT NASAL SPRAY    1 spray by Nasal route daily    GLUCOS-CHOND-HYAL AC-CA FRUCTO (MOVE FREE [facility] ADVANCE) TABS    Take by mouth    IBUPROFEN (ADVIL;MOTRIN) 400 MG TABLET    Take 1 tablet by mouth 3 times daily    OMEPRAZOLE (PRILOSEC) 20 MG DELAYED RELEASE CAPSULE    TAKE 1 CAPSULE TWICE A DAY    OXYBUTYNIN (DITROPAN-XL) 10 MG EXTENDED RELEASE TABLET    Take 1 tablet by mouth daily    RANITIDINE (ZANTAC) 150 MG TABLET    Take 150 mg by mouth daily as needed for Heartburn    SIMVASTATIN (ZOCOR) 20 MG TABLET    Take 1 tablet by mouth nightly       ALLERGIES     has No Known Allergies. FAMILY HISTORY     indicated that his mother is . He indicated that his father is . family history includes Cancer in his father and mother. SOCIAL HISTORY      reports that he has quit smoking. He quit after 20.00 years of use. He has never used smokeless tobacco. He reports that he drinks alcohol.  He reports that he does not use drugs. PHYSICAL EXAM     INITIAL VITALS:  height is 6' 1\" (1.854 m) and weight is 104.3 kg (230 lb). His oral temperature is 97.5 °F (36.4 °C). His blood pressure is 145/93 (abnormal) and his pulse is 87. His respiration is 18 and oxygen saturation is 95%. HEENT atraumatic normocephalic  Neck is supple  Lungs are clear  Cardiac regular  Abdomen is soft  Extremities: There is an approximately a 1 and 1/2 cm linear laceration to the dorsum of the right hand which has some bleeding at this point in time we'll need stitches. DIAGNOSTIC RESULTS     EKG: All EKG's are interpreted by the Emergency Department Physician who either signs or Co-signs this chart in the absence of a cardiologist.        RADIOLOGY:   Non-plain film images such as CT, Ultrasound and MRI are read by the radiologist. Plain radiographic images are visualized and the radiologist interpretations are reviewed as follows:         LABS:  No results found for this visit on 01/13/18. EMERGENCY DEPARTMENT COURSE:   Vitals:    Vitals:    01/13/18 1858   BP: (!) 145/93   Pulse: 87   Resp: 18   Temp: 97.5 °F (36.4 °C)   TempSrc: Oral   SpO2: 95%   Weight: 104.3 kg (230 lb)   Height: 6' 1\" (1.854 m)     -------------------------  BP: (!) 145/93, Temp: 97.5 °F (36.4 °C), Pulse: 87, Resp: 18      PERTINENT ATTENDING PHYSICIAN COMMENTS:     The laceration was repaired by Heriberto Lawrence,  physician assistant,  and patient will have his tetanus updated. (Please note that portions of this note were completed with a voice recognition program.  Efforts were made to edit the dictations but occasionally words are mis-transcribed.)    Singh MD, F.A.C.E.P.   Attending Emergency Medicine Physician        Dwayne Kim MD  01/24/18 2485

## 2019-03-15 ENCOUNTER — OFFICE VISIT (OUTPATIENT)
Dept: PRIMARY CARE CLINIC | Age: 79
End: 2019-03-15
Payer: MEDICARE

## 2019-03-15 VITALS
SYSTOLIC BLOOD PRESSURE: 114 MMHG | RESPIRATION RATE: 18 BRPM | WEIGHT: 230.6 LBS | HEART RATE: 68 BPM | DIASTOLIC BLOOD PRESSURE: 66 MMHG | BODY MASS INDEX: 30.56 KG/M2 | HEIGHT: 73 IN

## 2019-03-15 DIAGNOSIS — Z23 NEED FOR PNEUMOCOCCAL VACCINATION: ICD-10-CM

## 2019-03-15 DIAGNOSIS — R20.0 NUMBNESS AND TINGLING OF LEFT UPPER AND LOWER EXTREMITY: ICD-10-CM

## 2019-03-15 DIAGNOSIS — K21.9 GASTROESOPHAGEAL REFLUX DISEASE WITHOUT ESOPHAGITIS: ICD-10-CM

## 2019-03-15 DIAGNOSIS — Z13.0 SCREENING FOR IRON DEFICIENCY ANEMIA: ICD-10-CM

## 2019-03-15 DIAGNOSIS — R20.2 NUMBNESS AND TINGLING OF LEFT UPPER AND LOWER EXTREMITY: ICD-10-CM

## 2019-03-15 DIAGNOSIS — Z85.46 HISTORY OF PROSTATE CANCER: ICD-10-CM

## 2019-03-15 DIAGNOSIS — J44.9 CHRONIC OBSTRUCTIVE PULMONARY DISEASE, UNSPECIFIED COPD TYPE (HCC): Primary | ICD-10-CM

## 2019-03-15 PROCEDURE — G0009 ADMIN PNEUMOCOCCAL VACCINE: HCPCS | Performed by: NURSE PRACTITIONER

## 2019-03-15 PROCEDURE — 90670 PCV13 VACCINE IM: CPT | Performed by: NURSE PRACTITIONER

## 2019-03-15 PROCEDURE — 1123F ACP DISCUSS/DSCN MKR DOCD: CPT | Performed by: NURSE PRACTITIONER

## 2019-03-15 PROCEDURE — 1036F TOBACCO NON-USER: CPT | Performed by: NURSE PRACTITIONER

## 2019-03-15 PROCEDURE — 1101F PT FALLS ASSESS-DOCD LE1/YR: CPT | Performed by: NURSE PRACTITIONER

## 2019-03-15 PROCEDURE — 3023F SPIROM DOC REV: CPT | Performed by: NURSE PRACTITIONER

## 2019-03-15 PROCEDURE — G8417 CALC BMI ABV UP PARAM F/U: HCPCS | Performed by: NURSE PRACTITIONER

## 2019-03-15 PROCEDURE — G8926 SPIRO NO PERF OR DOC: HCPCS | Performed by: NURSE PRACTITIONER

## 2019-03-15 PROCEDURE — G8427 DOCREV CUR MEDS BY ELIG CLIN: HCPCS | Performed by: NURSE PRACTITIONER

## 2019-03-15 PROCEDURE — 99214 OFFICE O/P EST MOD 30 MIN: CPT | Performed by: NURSE PRACTITIONER

## 2019-03-15 PROCEDURE — G8484 FLU IMMUNIZE NO ADMIN: HCPCS | Performed by: NURSE PRACTITIONER

## 2019-03-15 PROCEDURE — 4040F PNEUMOC VAC/ADMIN/RCVD: CPT | Performed by: NURSE PRACTITIONER

## 2019-03-15 ASSESSMENT — ENCOUNTER SYMPTOMS
DIARRHEA: 0
CONSTIPATION: 0
SHORTNESS OF BREATH: 0
WHEEZING: 0
GLOBUS SENSATION: 1
COUGH: 0
HEARTBURN: 1
NAUSEA: 0
BLOOD IN STOOL: 0
ABDOMINAL PAIN: 0
SINUS PRESSURE: 0
TROUBLE SWALLOWING: 0
VOMITING: 0
SORE THROAT: 1

## 2019-03-15 ASSESSMENT — PATIENT HEALTH QUESTIONNAIRE - PHQ9
1. LITTLE INTEREST OR PLEASURE IN DOING THINGS: 0
SUM OF ALL RESPONSES TO PHQ QUESTIONS 1-9: 0
SUM OF ALL RESPONSES TO PHQ9 QUESTIONS 1 & 2: 0
SUM OF ALL RESPONSES TO PHQ QUESTIONS 1-9: 0
2. FEELING DOWN, DEPRESSED OR HOPELESS: 0

## 2019-03-19 DIAGNOSIS — Z13.0 SCREENING FOR IRON DEFICIENCY ANEMIA: ICD-10-CM

## 2019-03-19 DIAGNOSIS — R20.0 NUMBNESS AND TINGLING OF LEFT UPPER AND LOWER EXTREMITY: ICD-10-CM

## 2019-03-19 DIAGNOSIS — R20.2 NUMBNESS AND TINGLING OF LEFT UPPER AND LOWER EXTREMITY: ICD-10-CM

## 2019-05-31 ENCOUNTER — OFFICE VISIT (OUTPATIENT)
Dept: PRIMARY CARE CLINIC | Age: 79
End: 2019-05-31
Payer: MEDICARE

## 2019-05-31 ENCOUNTER — TELEPHONE (OUTPATIENT)
Dept: PRIMARY CARE CLINIC | Age: 79
End: 2019-05-31

## 2019-05-31 VITALS
WEIGHT: 231 LBS | DIASTOLIC BLOOD PRESSURE: 70 MMHG | HEIGHT: 73 IN | HEART RATE: 80 BPM | BODY MASS INDEX: 30.62 KG/M2 | RESPIRATION RATE: 18 BRPM | SYSTOLIC BLOOD PRESSURE: 116 MMHG

## 2019-05-31 DIAGNOSIS — L73.9 FOLLICULITIS: Primary | ICD-10-CM

## 2019-05-31 DIAGNOSIS — J40 BRONCHITIS: ICD-10-CM

## 2019-05-31 PROCEDURE — 1123F ACP DISCUSS/DSCN MKR DOCD: CPT | Performed by: FAMILY MEDICINE

## 2019-05-31 PROCEDURE — 4040F PNEUMOC VAC/ADMIN/RCVD: CPT | Performed by: FAMILY MEDICINE

## 2019-05-31 PROCEDURE — G8417 CALC BMI ABV UP PARAM F/U: HCPCS | Performed by: FAMILY MEDICINE

## 2019-05-31 PROCEDURE — 1036F TOBACCO NON-USER: CPT | Performed by: FAMILY MEDICINE

## 2019-05-31 PROCEDURE — 99214 OFFICE O/P EST MOD 30 MIN: CPT | Performed by: FAMILY MEDICINE

## 2019-05-31 PROCEDURE — G8427 DOCREV CUR MEDS BY ELIG CLIN: HCPCS | Performed by: FAMILY MEDICINE

## 2019-05-31 RX ORDER — AMOXICILLIN AND CLAVULANATE POTASSIUM 875; 125 MG/1; MG/1
1 TABLET, FILM COATED ORAL 2 TIMES DAILY
Qty: 20 TABLET | Refills: 0 | Status: SHIPPED | OUTPATIENT
Start: 2019-05-31 | End: 2019-06-10

## 2019-05-31 ASSESSMENT — ENCOUNTER SYMPTOMS
SHORTNESS OF BREATH: 0
VOMITING: 0
NAUSEA: 0
COUGH: 1
WHEEZING: 0

## 2019-05-31 NOTE — PROGRESS NOTES
[facility]  [address] [place] [address] [address]  [place] [address]  Dept: [phone]  Dept Fax: [phone]    Beatriz Ring is a 70 y.o. male who presents today for his medical conditions/complaints as noted below. Beatriz Ring is c/o of  Chief Complaint   Patient presents with    Other     \"boil\" on Left buttock, noticed last night    Other     right side of chin has a small sore x 2 days       HPI:     HPI   Pt here due to boil he felt on left buttock last night. States felt a little sore when he sat. No drainage or bleeding noted. Denies any fevers or chills, nausea or vomiting. Has scab on right chin after picking at ingrown hair as well. Not draining. States he has been coughing more for past few weeks and has yellow/green sputum and has nasal drainage as well. Has been using flonase and saline which have helped. Denies any sob or wheezing. States zpak does not usually help and longer abx improves his symptoms.       No results found for: LABA1C          ( goal A1C is < 7)   No results found for: LABMICR  LDL Calculated (mg/dL)   Date Value   10/31/2017 153   09/28/2016 101       (goal LDL is <100)   AST (U/L)   Date Value   10/31/2017 19     ALT (U/L)   Date Value   10/31/2017 15     BUN (mg/dL)   Date Value   10/31/2017 13     BP Readings from Last 3 Encounters:   05/31/19 116/70   03/15/19 114/66   01/13/18 (!) 145/93          (goal 120/80)    Past Medical History:   Diagnosis Date    COPD (chronic obstructive pulmonary disease) (HCC)     Diverticulosis     Gastritis     GERD (gastroesophageal reflux disease)     Hyperlipidemia     Multiple lung nodules     Prostate cancer (HCC)     Seasonal allergies       Past Surgical History:   Procedure Laterality Date    COLONOSCOPY      done about 10 years ago    COLONOSCOPY  11/10/2016    severe diverticulosis     CYST REMOVAL      groin, left arm    ENDOSCOPY, COLON, DIAGNOSTIC  HEMORRHOID SURGERY      LUNG BIOPSY      left side    OTHER SURGICAL HISTORY      prostate seeding    UPPER GASTROINTESTINAL ENDOSCOPY      done about 10 years ago    UPPER GASTROINTESTINAL ENDOSCOPY  11/10/2016    duodenal bulb polyp, MODERATE GASTRITIS        Family History   Problem Relation Age of Onset    Cancer Mother     Cancer Father         colon       Social History     Tobacco Use    Smoking status: Former Smoker     Packs/day: 1.00     Years: 20.00     Pack years: 20.00     Last attempt to quit: 1985     Years since quittin.4    Smokeless tobacco: Never Used    Tobacco comment: stopped smoking 20 years   Substance Use Topics    Alcohol use: Yes     Comment: social      Current Outpatient Medications   Medication Sig Dispense Refill    Calcium Carbonate-Vit D-Min (CALCIUM 1200 PO) Take by mouth      Ginger, Zingiber officinalis, (GINGER ROOT PO) Take by mouth      mupirocin (BACTROBAN) 2 % ointment Apply topically 3 times daily for ten days. 1 Tube 0    amoxicillin-clavulanate (AUGMENTIN) 875-125 MG per tablet Take 1 tablet by mouth 2 times daily for 10 days 20 tablet 0    ibuprofen (ADVIL;MOTRIN) 400 MG tablet Take 1 tablet by mouth 3 times daily 30 tablet 1    albuterol sulfate HFA (PROAIR HFA) 108 (90 BASE) MCG/ACT inhaler Inhale 2 puffs into the lungs every 6 hours as needed for Wheezing 1 Inhaler 5    b complex vitamins capsule Take 1 capsule by mouth daily      Glucos-Chond-Hyal Ac-Ca Fructo (MOVE FREE [facility] ADVANCE) TABS Take by mouth      aspirin 81 MG tablet Take 81 mg by mouth daily      coenzyme Q10 100 MG CAPS capsule Take 100 mg by mouth daily      Ascorbic Acid (VITAMIN C) 250 MG tablet Take 500 mg by mouth daily       No current facility-administered medications for this visit.       No Known Allergies    Health Maintenance   Topic Date Due    Shingles Vaccine (2 of 3) 2012    DTaP/Tdap/Td vaccine (2 - Td) 2028    Flu vaccine  Completed    Pneumococcal 65+ years Vaccine  Completed       Subjective:      Review of Systems   Constitutional: Negative for chills and fever. Respiratory: Positive for cough. Negative for shortness of breath and wheezing. Cardiovascular: Negative for chest pain. Gastrointestinal: Negative for nausea and vomiting. Skin:        Small boil on left buttock and bump right chin       Objective:     Physical Exam   Constitutional: He is oriented to person, place, and time. He appears well-developed and well-nourished. No distress. HENT:   Head: Normocephalic. Eyes: Pupils are equal, round, and reactive to light. Neck: Neck supple. Cardiovascular: Normal rate, regular rhythm and normal heart sounds. No murmur heard. Pulmonary/Chest: Effort normal and breath sounds normal. No stridor. No respiratory distress. He has no wheezes. Neurological: He is alert and oriented to person, place, and time. Skin: Skin is warm and dry. He is not diaphoretic. Small pinpoint size boil/pustule on left buttock, no drainage noted. Area of scabbing on R chin, no drainage noted   Psychiatric: He has a normal mood and affect. His behavior is normal.     /70 (Site: Left Upper Arm, Position: Sitting, Cuff Size: Large Adult)   Pulse 80   Resp 18   Ht 6' 1\" (1.854 m)   Wt 231 lb (104.8 kg)   BMI 30.48 kg/m²     Assessment:      1. Folliculitis  - recommend topical ointment to the small area on left buttock. R chin with scab noted , likely from pt picking at chin, recommend pt to monitor it.   - mupirocin (BACTROBAN) 2 % ointment; Apply topically 3 times daily for ten days. Dispense: 1 Tube; Refill: 0    2. Bronchitis  - pt states has mucinex at home, can use that and also continue flonase, saline spray as well. Use inhaler as needed. Discussed if having SOB to call, can send prednisone if needed. - amoxicillin-clavulanate (AUGMENTIN) 875-125 MG per tablet;  Take 1 tablet by mouth 2 times daily for 10 days  Dispense: 20 tablet; Refill: 0       Plan:      Return if symptoms worsen or fail to improve. No orders of the defined types were placed in this encounter. Orders Placed This Encounter   Medications    mupirocin (BACTROBAN) 2 % ointment     Sig: Apply topically 3 times daily for ten days.      Dispense:  1 Tube     Refill:  0    amoxicillin-clavulanate (AUGMENTIN) 875-125 MG per tablet     Sig: Take 1 tablet by mouth 2 times daily for 10 days     Dispense:  20 tablet     Refill:  0            Electronically signed by Cristian Chinchilla DO on 5/31/2019 at 12:39 PM

## 2020-04-20 ENCOUNTER — TELEPHONE (OUTPATIENT)
Dept: ADMINISTRATIVE | Age: 80
End: 2020-04-20

## 2020-05-28 ENCOUNTER — NURSE TRIAGE (OUTPATIENT)
Dept: OTHER | Age: 80
End: 2020-05-28

## 2020-06-23 ENCOUNTER — OFFICE VISIT (OUTPATIENT)
Dept: PRIMARY CARE CLINIC | Age: 80
End: 2020-06-23
Payer: MEDICARE

## 2020-06-23 VITALS
SYSTOLIC BLOOD PRESSURE: 112 MMHG | BODY MASS INDEX: 30.53 KG/M2 | WEIGHT: 231.4 LBS | HEART RATE: 66 BPM | TEMPERATURE: 97.7 F | OXYGEN SATURATION: 97 % | DIASTOLIC BLOOD PRESSURE: 62 MMHG

## 2020-06-23 PROCEDURE — 1036F TOBACCO NON-USER: CPT | Performed by: NURSE PRACTITIONER

## 2020-06-23 PROCEDURE — G8417 CALC BMI ABV UP PARAM F/U: HCPCS | Performed by: NURSE PRACTITIONER

## 2020-06-23 PROCEDURE — 99214 OFFICE O/P EST MOD 30 MIN: CPT | Performed by: NURSE PRACTITIONER

## 2020-06-23 PROCEDURE — 4040F PNEUMOC VAC/ADMIN/RCVD: CPT | Performed by: NURSE PRACTITIONER

## 2020-06-23 PROCEDURE — G8427 DOCREV CUR MEDS BY ELIG CLIN: HCPCS | Performed by: NURSE PRACTITIONER

## 2020-06-23 PROCEDURE — 1123F ACP DISCUSS/DSCN MKR DOCD: CPT | Performed by: NURSE PRACTITIONER

## 2020-06-23 ASSESSMENT — PATIENT HEALTH QUESTIONNAIRE - PHQ9
2. FEELING DOWN, DEPRESSED OR HOPELESS: 0
SUM OF ALL RESPONSES TO PHQ QUESTIONS 1-9: 0
SUM OF ALL RESPONSES TO PHQ QUESTIONS 1-9: 0
SUM OF ALL RESPONSES TO PHQ9 QUESTIONS 1 & 2: 0
1. LITTLE INTEREST OR PLEASURE IN DOING THINGS: 0

## 2020-06-23 ASSESSMENT — ENCOUNTER SYMPTOMS
CONSTIPATION: 0
ABDOMINAL PAIN: 0
DIARRHEA: 0
COUGH: 0
BLOOD IN STOOL: 0
TROUBLE SWALLOWING: 0
SORE THROAT: 0
NAUSEA: 0
SINUS PRESSURE: 0
WHEEZING: 0
VOMITING: 0
SHORTNESS OF BREATH: 0

## 2020-06-23 NOTE — PROGRESS NOTES
No current facility-administered medications for this visit. No Known Allergies    Health Maintenance   Topic Date Due    PSA counseling  11/05/1992    Shingles Vaccine (2 of 3) 07/21/2012    Annual Wellness Visit (AWV)  05/29/2019    Flu vaccine (Season Ended) 09/01/2020    DTaP/Tdap/Td vaccine (2 - Td) 01/13/2028    Pneumococcal 65+ years Vaccine  Completed    Hepatitis A vaccine  Aged Out    Hepatitis B vaccine  Aged Out    Hib vaccine  Aged Out    Meningococcal (ACWY) vaccine  Aged Out       Subjective:      Review of Systems   Constitutional: Negative for activity change, appetite change, chills, fatigue, fever and unexpected weight change. HENT: Negative for congestion, ear pain, hearing loss, sinus pressure, sore throat and trouble swallowing. Eyes: Negative for visual disturbance. Respiratory: Negative for cough, shortness of breath and wheezing. Cardiovascular: Negative for chest pain, palpitations and leg swelling. Gastrointestinal: Negative for abdominal pain, blood in stool, constipation, diarrhea, nausea and vomiting. Endocrine: Negative for cold intolerance, heat intolerance, polydipsia, polyphagia and polyuria. Genitourinary: Negative for difficulty urinating, frequency, hematuria and urgency. Musculoskeletal: Negative for arthralgias and myalgias. Skin: Negative for rash. Allergic/Immunologic: Negative for environmental allergies. Neurological: Negative for dizziness, weakness, light-headedness and headaches. Psychiatric/Behavioral: Negative for confusion. The patient is not nervous/anxious. Objective:     Physical Exam  Constitutional:       Appearance: He is well-developed. HENT:      Head: Normocephalic. Eyes:      Conjunctiva/sclera: Conjunctivae normal.      Pupils: Pupils are equal, round, and reactive to light. Neck:      Musculoskeletal: Normal range of motion. Cardiovascular:      Rate and Rhythm: Normal rate and regular rhythm.

## 2020-07-01 ENCOUNTER — OFFICE VISIT (OUTPATIENT)
Dept: SURGERY | Age: 80
End: 2020-07-01
Payer: MEDICARE

## 2020-07-01 VITALS
HEIGHT: 73 IN | SYSTOLIC BLOOD PRESSURE: 124 MMHG | DIASTOLIC BLOOD PRESSURE: 68 MMHG | BODY MASS INDEX: 30.48 KG/M2 | HEART RATE: 66 BPM | WEIGHT: 230 LBS | OXYGEN SATURATION: 97 %

## 2020-07-01 PROCEDURE — 99203 OFFICE O/P NEW LOW 30 MIN: CPT | Performed by: SURGERY

## 2020-07-01 ASSESSMENT — ENCOUNTER SYMPTOMS
RESPIRATORY NEGATIVE: 1
GASTROINTESTINAL NEGATIVE: 1

## 2020-07-06 ENCOUNTER — TELEPHONE (OUTPATIENT)
Dept: SURGERY | Age: 80
End: 2020-07-06

## 2020-07-06 NOTE — TELEPHONE ENCOUNTER
Left VM for pt to call office. Dr. Ric Joyce will not be available the week that he  Requested for his procedure, so he will have to pick a different day.

## 2020-07-23 ENCOUNTER — HOSPITAL ENCOUNTER (OUTPATIENT)
Dept: PREADMISSION TESTING | Age: 80
Discharge: HOME | End: 2020-07-27
Payer: MEDICARE

## 2020-07-23 VITALS
SYSTOLIC BLOOD PRESSURE: 124 MMHG | TEMPERATURE: 97.4 F | DIASTOLIC BLOOD PRESSURE: 74 MMHG | RESPIRATION RATE: 16 BRPM | WEIGHT: 230 LBS | OXYGEN SATURATION: 97 % | HEART RATE: 61 BPM | BODY MASS INDEX: 30.34 KG/M2

## 2020-07-23 LAB
ABSOLUTE EOS #: 0.2 K/UL (ref 0–0.44)
ABSOLUTE IMMATURE GRANULOCYTE: <0.03 K/UL (ref 0–0.3)
ABSOLUTE LYMPH #: 1 K/UL (ref 1.1–3.7)
ABSOLUTE MONO #: 0.38 K/UL (ref 0.1–1.2)
ANION GAP SERPL CALCULATED.3IONS-SCNC: 12 MMOL/L (ref 9–17)
BASOPHILS # BLD: 1 % (ref 0–2)
BASOPHILS ABSOLUTE: 0.04 K/UL (ref 0–0.2)
BUN BLDV-MCNC: 13 MG/DL (ref 8–23)
BUN/CREAT BLD: ABNORMAL (ref 9–20)
CALCIUM SERPL-MCNC: 8.5 MG/DL (ref 8.6–10.4)
CHLORIDE BLD-SCNC: 108 MMOL/L (ref 98–107)
CO2: 25 MMOL/L (ref 20–31)
CREAT SERPL-MCNC: 0.93 MG/DL (ref 0.7–1.2)
DIFFERENTIAL TYPE: ABNORMAL
EOSINOPHILS RELATIVE PERCENT: 4 % (ref 1–4)
GFR AFRICAN AMERICAN: >60 ML/MIN
GFR NON-AFRICAN AMERICAN: >60 ML/MIN
GFR SERPL CREATININE-BSD FRML MDRD: ABNORMAL ML/MIN/{1.73_M2}
GFR SERPL CREATININE-BSD FRML MDRD: ABNORMAL ML/MIN/{1.73_M2}
GLUCOSE BLD-MCNC: 82 MG/DL (ref 70–99)
HCT VFR BLD CALC: 44 % (ref 40.7–50.3)
HEMOGLOBIN: 14.5 G/DL (ref 13–17)
IMMATURE GRANULOCYTES: 0 %
LYMPHOCYTES # BLD: 20 % (ref 24–43)
MCH RBC QN AUTO: 31 PG (ref 25.2–33.5)
MCHC RBC AUTO-ENTMCNC: 33 G/DL (ref 28.4–34.8)
MCV RBC AUTO: 94 FL (ref 82.6–102.9)
MONOCYTES # BLD: 8 % (ref 3–12)
NRBC AUTOMATED: 0 PER 100 WBC
PDW BLD-RTO: 12.9 % (ref 11.8–14.4)
PLATELET # BLD: 201 K/UL (ref 138–453)
PLATELET ESTIMATE: ABNORMAL
PMV BLD AUTO: 11.6 FL (ref 8.1–13.5)
POTASSIUM SERPL-SCNC: 4.4 MMOL/L (ref 3.7–5.3)
RBC # BLD: 4.68 M/UL (ref 4.21–5.77)
RBC # BLD: ABNORMAL 10*6/UL
SEG NEUTROPHILS: 67 % (ref 36–65)
SEGMENTED NEUTROPHILS ABSOLUTE COUNT: 3.25 K/UL (ref 1.5–8.1)
SODIUM BLD-SCNC: 145 MMOL/L (ref 135–144)
WBC # BLD: 4.9 K/UL (ref 3.5–11.3)
WBC # BLD: ABNORMAL 10*3/UL

## 2020-07-23 PROCEDURE — 85025 COMPLETE CBC W/AUTO DIFF WBC: CPT

## 2020-07-23 PROCEDURE — 80048 BASIC METABOLIC PNL TOTAL CA: CPT

## 2020-07-23 PROCEDURE — 93005 ELECTROCARDIOGRAM TRACING: CPT | Performed by: SURGERY

## 2020-07-23 PROCEDURE — 36415 COLL VENOUS BLD VENIPUNCTURE: CPT

## 2020-07-23 RX ORDER — FLUTICASONE PROPIONATE 50 MCG
1 SPRAY, SUSPENSION (ML) NASAL DAILY PRN
Status: ON HOLD | COMMUNITY
End: 2021-08-13

## 2020-07-23 RX ORDER — TAMSULOSIN HYDROCHLORIDE 0.4 MG/1
0.4 CAPSULE ORAL NIGHTLY
COMMUNITY
End: 2021-07-09 | Stop reason: ALTCHOICE

## 2020-07-24 LAB
EKG ATRIAL RATE: 58 BPM
EKG P AXIS: 44 DEGREES
EKG P-R INTERVAL: 176 MS
EKG Q-T INTERVAL: 418 MS
EKG QRS DURATION: 94 MS
EKG QTC CALCULATION (BAZETT): 410 MS
EKG R AXIS: 44 DEGREES
EKG T AXIS: 38 DEGREES
EKG VENTRICULAR RATE: 58 BPM

## 2020-07-27 ENCOUNTER — ANESTHESIA EVENT (OUTPATIENT)
Dept: OPERATING ROOM | Age: 80
End: 2020-07-27
Payer: MEDICARE

## 2020-07-30 ENCOUNTER — HOSPITAL ENCOUNTER (OUTPATIENT)
Dept: PREADMISSION TESTING | Age: 80
Setting detail: SPECIMEN
Discharge: HOME | End: 2020-08-03
Payer: MEDICARE

## 2020-07-30 PROCEDURE — U0003 INFECTIOUS AGENT DETECTION BY NUCLEIC ACID (DNA OR RNA); SEVERE ACUTE RESPIRATORY SYNDROME CORONAVIRUS 2 (SARS-COV-2) (CORONAVIRUS DISEASE [COVID-19]), AMPLIFIED PROBE TECHNIQUE, MAKING USE OF HIGH THROUGHPUT TECHNOLOGIES AS DESCRIBED BY CMS-2020-01-R: HCPCS

## 2020-07-31 LAB — SARS-COV-2, NAA: NOT DETECTED

## 2020-08-01 ENCOUNTER — TELEPHONE (OUTPATIENT)
Dept: FAMILY MEDICINE CLINIC | Age: 80
End: 2020-08-01

## 2020-08-03 ENCOUNTER — HOSPITAL ENCOUNTER (OUTPATIENT)
Age: 80
Setting detail: OUTPATIENT SURGERY
Discharge: HOME | End: 2020-08-03
Attending: SURGERY | Admitting: SURGERY
Payer: MEDICARE

## 2020-08-03 ENCOUNTER — ANESTHESIA (OUTPATIENT)
Dept: OPERATING ROOM | Age: 80
End: 2020-08-03
Payer: MEDICARE

## 2020-08-03 VITALS
SYSTOLIC BLOOD PRESSURE: 137 MMHG | RESPIRATION RATE: 16 BRPM | TEMPERATURE: 96.1 F | OXYGEN SATURATION: 99 % | DIASTOLIC BLOOD PRESSURE: 79 MMHG

## 2020-08-03 VITALS
DIASTOLIC BLOOD PRESSURE: 65 MMHG | HEART RATE: 57 BPM | WEIGHT: 226.4 LBS | TEMPERATURE: 99 F | SYSTOLIC BLOOD PRESSURE: 116 MMHG | HEIGHT: 73 IN | OXYGEN SATURATION: 97 % | RESPIRATION RATE: 18 BRPM | BODY MASS INDEX: 30 KG/M2

## 2020-08-03 PROCEDURE — 3700000000 HC ANESTHESIA ATTENDED CARE: Performed by: SURGERY

## 2020-08-03 PROCEDURE — 49650 LAP ING HERNIA REPAIR INIT: CPT | Performed by: SURGERY

## 2020-08-03 PROCEDURE — 2709999900 HC NON-CHARGEABLE SUPPLY: Performed by: SURGERY

## 2020-08-03 PROCEDURE — 2580000003 HC RX 258: Performed by: ANESTHESIOLOGY

## 2020-08-03 PROCEDURE — 2500000003 HC RX 250 WO HCPCS: Performed by: ANESTHESIOLOGY

## 2020-08-03 PROCEDURE — 7100000011 HC PHASE II RECOVERY - ADDTL 15 MIN: Performed by: SURGERY

## 2020-08-03 PROCEDURE — S2900 ROBOTIC SURGICAL SYSTEM: HCPCS | Performed by: SURGERY

## 2020-08-03 PROCEDURE — 7100000000 HC PACU RECOVERY - FIRST 15 MIN: Performed by: SURGERY

## 2020-08-03 PROCEDURE — 2500000003 HC RX 250 WO HCPCS: Performed by: NURSE ANESTHETIST, CERTIFIED REGISTERED

## 2020-08-03 PROCEDURE — 3600000019 HC SURGERY ROBOT ADDTL 15MIN: Performed by: SURGERY

## 2020-08-03 PROCEDURE — C1781 MESH (IMPLANTABLE): HCPCS | Performed by: SURGERY

## 2020-08-03 PROCEDURE — 3600000009 HC SURGERY ROBOT BASE: Performed by: SURGERY

## 2020-08-03 PROCEDURE — 6360000002 HC RX W HCPCS: Performed by: NURSE ANESTHETIST, CERTIFIED REGISTERED

## 2020-08-03 PROCEDURE — 64488 TAP BLOCK BI INJECTION: CPT | Performed by: NURSE ANESTHETIST, CERTIFIED REGISTERED

## 2020-08-03 PROCEDURE — 6360000002 HC RX W HCPCS

## 2020-08-03 PROCEDURE — 3700000001 HC ADD 15 MINUTES (ANESTHESIA): Performed by: SURGERY

## 2020-08-03 PROCEDURE — 7100000010 HC PHASE II RECOVERY - FIRST 15 MIN: Performed by: SURGERY

## 2020-08-03 PROCEDURE — 6370000000 HC RX 637 (ALT 250 FOR IP): Performed by: ANESTHESIOLOGY

## 2020-08-03 DEVICE — MESH SURG W3.5XL6IN POLY SELF FIXATING RECT W/ RESRB PLA: Type: IMPLANTABLE DEVICE | Site: INGUINAL | Status: FUNCTIONAL

## 2020-08-03 RX ORDER — ROCURONIUM BROMIDE 10 MG/ML
INJECTION, SOLUTION INTRAVENOUS PRN
Status: DISCONTINUED | OUTPATIENT
Start: 2020-08-03 | End: 2020-08-03 | Stop reason: SDUPTHER

## 2020-08-03 RX ORDER — HYDROCODONE BITARTRATE AND ACETAMINOPHEN 5; 325 MG/1; MG/1
1 TABLET ORAL
Qty: 10 TABLET | Refills: 0 | Status: SHIPPED | OUTPATIENT
Start: 2020-08-03 | End: 2020-08-08

## 2020-08-03 RX ORDER — LABETALOL 20 MG/4 ML (5 MG/ML) INTRAVENOUS SYRINGE
5 EVERY 10 MIN PRN
Status: DISCONTINUED | OUTPATIENT
Start: 2020-08-03 | End: 2020-08-03 | Stop reason: HOSPADM

## 2020-08-03 RX ORDER — NEOSTIGMINE METHYLSULFATE 5 MG/5 ML
SYRINGE (ML) INTRAVENOUS PRN
Status: DISCONTINUED | OUTPATIENT
Start: 2020-08-03 | End: 2020-08-03 | Stop reason: SDUPTHER

## 2020-08-03 RX ORDER — MIDAZOLAM HYDROCHLORIDE 1 MG/ML
2 INJECTION INTRAMUSCULAR; INTRAVENOUS ONCE
Status: COMPLETED | OUTPATIENT
Start: 2020-08-03 | End: 2020-08-03

## 2020-08-03 RX ORDER — FENTANYL CITRATE 50 UG/ML
INJECTION, SOLUTION INTRAMUSCULAR; INTRAVENOUS PRN
Status: DISCONTINUED | OUTPATIENT
Start: 2020-08-03 | End: 2020-08-03 | Stop reason: SDUPTHER

## 2020-08-03 RX ORDER — 0.9 % SODIUM CHLORIDE 0.9 %
500 INTRAVENOUS SOLUTION INTRAVENOUS
Status: DISCONTINUED | OUTPATIENT
Start: 2020-08-03 | End: 2020-08-03 | Stop reason: HOSPADM

## 2020-08-03 RX ORDER — FENTANYL CITRATE 50 UG/ML
100 INJECTION, SOLUTION INTRAMUSCULAR; INTRAVENOUS ONCE
Status: COMPLETED | OUTPATIENT
Start: 2020-08-03 | End: 2020-08-03

## 2020-08-03 RX ORDER — BUPIVACAINE HYDROCHLORIDE 2.5 MG/ML
INJECTION, SOLUTION EPIDURAL; INFILTRATION; INTRACAUDAL
Status: COMPLETED | OUTPATIENT
Start: 2020-08-03 | End: 2020-08-03

## 2020-08-03 RX ORDER — FENTANYL CITRATE 50 UG/ML
INJECTION, SOLUTION INTRAMUSCULAR; INTRAVENOUS
Status: COMPLETED
Start: 2020-08-03 | End: 2020-08-03

## 2020-08-03 RX ORDER — DIPHENHYDRAMINE HYDROCHLORIDE 50 MG/ML
12.5 INJECTION INTRAMUSCULAR; INTRAVENOUS
Status: DISCONTINUED | OUTPATIENT
Start: 2020-08-03 | End: 2020-08-03 | Stop reason: HOSPADM

## 2020-08-03 RX ORDER — SODIUM CHLORIDE, SODIUM LACTATE, POTASSIUM CHLORIDE, CALCIUM CHLORIDE 600; 310; 30; 20 MG/100ML; MG/100ML; MG/100ML; MG/100ML
INJECTION, SOLUTION INTRAVENOUS CONTINUOUS
Status: DISCONTINUED | OUTPATIENT
Start: 2020-08-03 | End: 2020-08-03 | Stop reason: HOSPADM

## 2020-08-03 RX ORDER — MIDAZOLAM HYDROCHLORIDE 1 MG/ML
INJECTION INTRAMUSCULAR; INTRAVENOUS
Status: COMPLETED
Start: 2020-08-03 | End: 2020-08-03

## 2020-08-03 RX ORDER — MORPHINE SULFATE 2 MG/ML
2 INJECTION, SOLUTION INTRAMUSCULAR; INTRAVENOUS EVERY 5 MIN PRN
Status: DISCONTINUED | OUTPATIENT
Start: 2020-08-03 | End: 2020-08-03 | Stop reason: HOSPADM

## 2020-08-03 RX ORDER — SODIUM CHLORIDE 0.9 % (FLUSH) 0.9 %
10 SYRINGE (ML) INJECTION PRN
Status: DISCONTINUED | OUTPATIENT
Start: 2020-08-03 | End: 2020-08-03 | Stop reason: HOSPADM

## 2020-08-03 RX ORDER — MORPHINE SULFATE 2 MG/ML
INJECTION, SOLUTION INTRAMUSCULAR; INTRAVENOUS
Status: COMPLETED
Start: 2020-08-03 | End: 2020-08-03

## 2020-08-03 RX ORDER — SODIUM CHLORIDE 9 MG/ML
INJECTION, SOLUTION INTRAVENOUS CONTINUOUS
Status: DISCONTINUED | OUTPATIENT
Start: 2020-08-03 | End: 2020-08-03 | Stop reason: HOSPADM

## 2020-08-03 RX ORDER — OXYCODONE HYDROCHLORIDE AND ACETAMINOPHEN 5; 325 MG/1; MG/1
1 TABLET ORAL PRN
Status: DISCONTINUED | OUTPATIENT
Start: 2020-08-03 | End: 2020-08-03 | Stop reason: HOSPADM

## 2020-08-03 RX ORDER — GLYCOPYRROLATE 1 MG/5 ML
SYRINGE (ML) INTRAVENOUS PRN
Status: DISCONTINUED | OUTPATIENT
Start: 2020-08-03 | End: 2020-08-03 | Stop reason: SDUPTHER

## 2020-08-03 RX ORDER — IPRATROPIUM BROMIDE AND ALBUTEROL SULFATE 2.5; .5 MG/3ML; MG/3ML
1 SOLUTION RESPIRATORY (INHALATION) ONCE
Status: COMPLETED | OUTPATIENT
Start: 2020-08-03 | End: 2020-08-03

## 2020-08-03 RX ORDER — ONDANSETRON 2 MG/ML
4 INJECTION INTRAMUSCULAR; INTRAVENOUS
Status: DISCONTINUED | OUTPATIENT
Start: 2020-08-03 | End: 2020-08-03 | Stop reason: HOSPADM

## 2020-08-03 RX ORDER — LIDOCAINE HYDROCHLORIDE 10 MG/ML
INJECTION, SOLUTION EPIDURAL; INFILTRATION; INTRACAUDAL; PERINEURAL PRN
Status: DISCONTINUED | OUTPATIENT
Start: 2020-08-03 | End: 2020-08-03 | Stop reason: SDUPTHER

## 2020-08-03 RX ORDER — BUPIVACAINE HYDROCHLORIDE 2.5 MG/ML
INJECTION, SOLUTION EPIDURAL; INFILTRATION; INTRACAUDAL
Status: COMPLETED
Start: 2020-08-03 | End: 2020-08-03

## 2020-08-03 RX ORDER — IPRATROPIUM BROMIDE AND ALBUTEROL SULFATE 2.5; .5 MG/3ML; MG/3ML
SOLUTION RESPIRATORY (INHALATION)
Status: DISCONTINUED
Start: 2020-08-03 | End: 2020-08-03 | Stop reason: HOSPADM

## 2020-08-03 RX ORDER — BUPIVACAINE HYDROCHLORIDE AND EPINEPHRINE 5; 5 MG/ML; UG/ML
INJECTION, SOLUTION EPIDURAL; INTRACAUDAL; PERINEURAL
Status: DISCONTINUED
Start: 2020-08-03 | End: 2020-08-03 | Stop reason: HOSPADM

## 2020-08-03 RX ORDER — OXYCODONE HYDROCHLORIDE AND ACETAMINOPHEN 5; 325 MG/1; MG/1
2 TABLET ORAL PRN
Status: DISCONTINUED | OUTPATIENT
Start: 2020-08-03 | End: 2020-08-03 | Stop reason: HOSPADM

## 2020-08-03 RX ORDER — SODIUM CHLORIDE 0.9 % (FLUSH) 0.9 %
10 SYRINGE (ML) INJECTION EVERY 12 HOURS SCHEDULED
Status: DISCONTINUED | OUTPATIENT
Start: 2020-08-03 | End: 2020-08-03 | Stop reason: HOSPADM

## 2020-08-03 RX ORDER — PROMETHAZINE HYDROCHLORIDE 25 MG/ML
12.5 INJECTION, SOLUTION INTRAMUSCULAR; INTRAVENOUS
Status: DISCONTINUED | OUTPATIENT
Start: 2020-08-03 | End: 2020-08-03 | Stop reason: HOSPADM

## 2020-08-03 RX ORDER — MEPERIDINE HYDROCHLORIDE 50 MG/ML
12.5 INJECTION INTRAMUSCULAR; INTRAVENOUS; SUBCUTANEOUS EVERY 5 MIN PRN
Status: DISCONTINUED | OUTPATIENT
Start: 2020-08-03 | End: 2020-08-03 | Stop reason: HOSPADM

## 2020-08-03 RX ORDER — CEFAZOLIN SODIUM 2 G/50ML
SOLUTION INTRAVENOUS PRN
Status: DISCONTINUED | OUTPATIENT
Start: 2020-08-03 | End: 2020-08-03 | Stop reason: SDUPTHER

## 2020-08-03 RX ORDER — PROPOFOL 10 MG/ML
INJECTION, EMULSION INTRAVENOUS PRN
Status: DISCONTINUED | OUTPATIENT
Start: 2020-08-03 | End: 2020-08-03 | Stop reason: SDUPTHER

## 2020-08-03 RX ADMIN — FENTANYL CITRATE 100 MCG: 50 INJECTION INTRAMUSCULAR; INTRAVENOUS at 09:01

## 2020-08-03 RX ADMIN — ROCURONIUM BROMIDE 50 MG: 10 INJECTION INTRAVENOUS at 09:01

## 2020-08-03 RX ADMIN — BUPIVACAINE HYDROCHLORIDE 60 ML: 2.5 INJECTION, SOLUTION EPIDURAL; INFILTRATION; INTRACAUDAL; PERINEURAL at 08:24

## 2020-08-03 RX ADMIN — SODIUM CHLORIDE, POTASSIUM CHLORIDE, SODIUM LACTATE AND CALCIUM CHLORIDE: 600; 310; 30; 20 INJECTION, SOLUTION INTRAVENOUS at 07:42

## 2020-08-03 RX ADMIN — MIDAZOLAM HYDROCHLORIDE 2 MG: 1 INJECTION INTRAMUSCULAR; INTRAVENOUS at 08:15

## 2020-08-03 RX ADMIN — LIDOCAINE HYDROCHLORIDE 50 MG: 10 INJECTION, SOLUTION EPIDURAL; INFILTRATION; INTRACAUDAL; PERINEURAL at 09:01

## 2020-08-03 RX ADMIN — MORPHINE SULFATE 2 MG: 2 INJECTION, SOLUTION INTRAMUSCULAR; INTRAVENOUS at 10:36

## 2020-08-03 RX ADMIN — CEFAZOLIN SODIUM 2 G: 2 SOLUTION INTRAVENOUS at 09:13

## 2020-08-03 RX ADMIN — PROPOFOL 150 MG: 10 INJECTION, EMULSION INTRAVENOUS at 09:01

## 2020-08-03 RX ADMIN — FENTANYL CITRATE 100 MCG: 50 INJECTION, SOLUTION INTRAMUSCULAR; INTRAVENOUS at 08:16

## 2020-08-03 RX ADMIN — Medication 0.6 MG: at 10:13

## 2020-08-03 RX ADMIN — MORPHINE SULFATE 2 MG: 2 INJECTION, SOLUTION INTRAMUSCULAR; INTRAVENOUS at 10:54

## 2020-08-03 RX ADMIN — SODIUM CHLORIDE, POTASSIUM CHLORIDE, SODIUM LACTATE AND CALCIUM CHLORIDE: 600; 310; 30; 20 INJECTION, SOLUTION INTRAVENOUS at 10:09

## 2020-08-03 RX ADMIN — Medication 3 MG: at 10:13

## 2020-08-03 RX ADMIN — IPRATROPIUM BROMIDE AND ALBUTEROL SULFATE 1 AMPULE: .5; 3 SOLUTION RESPIRATORY (INHALATION) at 11:09

## 2020-08-03 RX ADMIN — MIDAZOLAM HYDROCHLORIDE 2 MG: 1 INJECTION, SOLUTION INTRAMUSCULAR; INTRAVENOUS at 08:15

## 2020-08-03 ASSESSMENT — PULMONARY FUNCTION TESTS
PIF_VALUE: 35
PIF_VALUE: 35
PIF_VALUE: 16
PIF_VALUE: 0
PIF_VALUE: 26
PIF_VALUE: 34
PIF_VALUE: 35
PIF_VALUE: 3
PIF_VALUE: 35
PIF_VALUE: 16
PIF_VALUE: 15
PIF_VALUE: 34
PIF_VALUE: 34
PIF_VALUE: 2
PIF_VALUE: 35
PIF_VALUE: 19
PIF_VALUE: 35
PIF_VALUE: 34
PIF_VALUE: 35
PIF_VALUE: 14
PIF_VALUE: 18
PIF_VALUE: 2
PIF_VALUE: 35
PIF_VALUE: 35
PIF_VALUE: 16
PIF_VALUE: 35
PIF_VALUE: 34
PIF_VALUE: 2
PIF_VALUE: 8
PIF_VALUE: 34
PIF_VALUE: 17
PIF_VALUE: 35
PIF_VALUE: 18
PIF_VALUE: 35
PIF_VALUE: 34
PIF_VALUE: 2
PIF_VALUE: 35
PIF_VALUE: 35
PIF_VALUE: 34
PIF_VALUE: 0
PIF_VALUE: 35
PIF_VALUE: 34
PIF_VALUE: 35
PIF_VALUE: 35
PIF_VALUE: 34
PIF_VALUE: 16
PIF_VALUE: 35
PIF_VALUE: 35
PIF_VALUE: 33
PIF_VALUE: 17
PIF_VALUE: 35
PIF_VALUE: 16
PIF_VALUE: 16
PIF_VALUE: 35
PIF_VALUE: 35
PIF_VALUE: 7
PIF_VALUE: 35
PIF_VALUE: 16
PIF_VALUE: 35
PIF_VALUE: 16
PIF_VALUE: 20
PIF_VALUE: 35
PIF_VALUE: 35
PIF_VALUE: 0
PIF_VALUE: 16
PIF_VALUE: 14
PIF_VALUE: 34
PIF_VALUE: 2
PIF_VALUE: 35
PIF_VALUE: 16
PIF_VALUE: 34
PIF_VALUE: 35
PIF_VALUE: 34
PIF_VALUE: 25
PIF_VALUE: 1
PIF_VALUE: 34
PIF_VALUE: 3
PIF_VALUE: 15
PIF_VALUE: 16
PIF_VALUE: 34
PIF_VALUE: 19
PIF_VALUE: 2
PIF_VALUE: 33

## 2020-08-03 ASSESSMENT — PAIN DESCRIPTION - LOCATION
LOCATION: ABDOMEN

## 2020-08-03 ASSESSMENT — PAIN DESCRIPTION - PAIN TYPE
TYPE: SURGICAL PAIN

## 2020-08-03 ASSESSMENT — PAIN DESCRIPTION - ORIENTATION
ORIENTATION: MID

## 2020-08-03 ASSESSMENT — PAIN - FUNCTIONAL ASSESSMENT
PAIN_FUNCTIONAL_ASSESSMENT: PREVENTS OR INTERFERES SOME ACTIVE ACTIVITIES AND ADLS
PAIN_FUNCTIONAL_ASSESSMENT: 0-10

## 2020-08-03 ASSESSMENT — PAIN SCALES - GENERAL
PAINLEVEL_OUTOF10: 5
PAINLEVEL_OUTOF10: 4
PAINLEVEL_OUTOF10: 4
PAINLEVEL_OUTOF10: 6
PAINLEVEL_OUTOF10: 4
PAINLEVEL_OUTOF10: 2
PAINLEVEL_OUTOF10: 4
PAINLEVEL_OUTOF10: 4
PAINLEVEL_OUTOF10: 6
PAINLEVEL_OUTOF10: 3

## 2020-08-03 ASSESSMENT — PAIN DESCRIPTION - DESCRIPTORS: DESCRIPTORS: BURNING;ACHING

## 2020-08-03 NOTE — H&P
[facility]  [address] [place], [address]     Coleman Campo   1940   [id number]      Johnna Sandoval, APRN - CNP  [address] [facility]  [address]  [facility], [address] [facility]           Chief Complaint   Patient presents with    New Patient       Patient states he has an inguinal hernia that he would like to be repaired.         History of Present Illness:     Patient is seen in evaluation of an right inguinal hernia . Has been present for 6 months. Some recent increase in size and discomfort. No specific inciting event.     Review of Systems   Constitutional: Negative. Respiratory: Negative. Cardiovascular: Negative. Gastrointestinal: Negative. Musculoskeletal: Negative.     Neurological: Negative.          No Known Allergies      Current Facility-Administered Medications          Current Outpatient Medications   Medication Sig Dispense Refill    Calcium Carbonate-Vit D-Min (CALCIUM 1200 PO) Take by mouth        Ginger, Zingiber officinalis, (GINGER ROOT PO) Take by mouth        ibuprofen (ADVIL;MOTRIN) 400 MG tablet Take 1 tablet by mouth 3 times daily 30 tablet 1    Ascorbic Acid (VITAMIN C) 250 MG tablet Take 500 mg by mouth daily        albuterol sulfate HFA (PROAIR HFA) 108 (90 BASE) MCG/ACT inhaler Inhale 2 puffs into the lungs every 6 hours as needed for Wheezing 1 Inhaler 5    b complex vitamins capsule Take 1 capsule by mouth daily        Glucos-Chond-Hyal Ac-Ca Fructo (MOVE FREE [facility]ALTH ADVANCE) TABS Take by mouth        aspirin 81 MG tablet Take 81 mg by mouth daily        coenzyme Q10 100 MG CAPS capsule Take 100 mg by mouth daily          No current facility-administered medications for this visit.            Past Medical History        Past Medical History:   Diagnosis Date    COPD (chronic obstructive pulmonary disease) (HCC)      Diverticulosis      Gastritis      GERD (gastroesophageal reflux disease)      Hyperlipidemia      Multiple lung nodules      Prostate cancer (HCC)      Seasonal allergies              Family History         Family History   Problem Relation Age of Onset    Cancer Mother      Cancer Father           colon            Past Surgical History         Past Surgical History:   Procedure Laterality Date    COLONOSCOPY         done about 10 years ago    COLONOSCOPY   11/10/2016     severe diverticulosis     CYST REMOVAL         groin, left arm    ENDOSCOPY, COLON, DIAGNOSTIC        HEMORRHOID SURGERY        LUNG BIOPSY         left side    OTHER SURGICAL HISTORY         prostate seeding    UPPER GASTROINTESTINAL ENDOSCOPY         done about 10 years ago    UPPER GASTROINTESTINAL ENDOSCOPY   11/10/2016     duodenal bulb polyp, MODERATE GASTRITIS                Social History               Socioeconomic History    Marital status:        Spouse name: Not on file    Number of children: Not on file    Years of education: Not on file    Highest education level: Not on file   Occupational History    Not on file   Social Needs    Financial resource strain: Not on file    Food insecurity       Worry: Not on file       Inability: Not on file    Transportation needs       Medical: Not on file       Non-medical: Not on file   Tobacco Use    Smoking status: Former Smoker       Packs/day: 1.00       Years: 20.00       Pack years: 20.00       Last attempt to quit: 1985       Years since quittin.5    Smokeless tobacco: Never Used    Tobacco comment: stopped smoking 20 years   Substance and Sexual Activity    Alcohol use:  Yes       Comment: social    Drug use: No    Sexual activity: Not on file   Lifestyle    Physical activity       Days per week: Not on file       Minutes per session: Not on file    Stress: Not on file   Relationships    Social connections       Talks on phone: Not on file       Gets together: Not on file       Attends Sabianist service: Not on file       Active member of club or organization: Not on file       Attends meetings of clubs or organizations: Not on file       Relationship status: Not on file    Intimate partner violence       Fear of current or ex partner: Not on file       Emotionally abused: Not on file       Physically abused: Not on file       Forced sexual activity: Not on file   Other Topics Concern    Not on file   Social History Narrative    Not on file            Vitals       Vitals:     07/01/20 1324   BP: 124/68   Site: Left Upper Arm   Position: Sitting   Cuff Size: Medium Adult   Pulse: 66   SpO2: 97%   Weight: 230 lb (104.3 kg)   Height: 6' 1\" (1.854 m)            Physical Exam  Constitutional:       Appearance: He is well-developed. HENT:      Head: Normocephalic and atraumatic. Neck:      Musculoskeletal: Normal range of motion and neck supple. Cardiovascular:      Rate and Rhythm: Normal rate and regular rhythm. Pulmonary:      Effort: Pulmonary effort is normal.      Breath sounds: Normal breath sounds. Abdominal:      General: Bowel sounds are normal.      Palpations: Abdomen is soft. Hernia: A hernia is present. Hernia is present in the right inguinal area. Musculoskeletal: Normal range of motion. Skin:     General: Skin is warm and dry. Neurological:      Mental Status: He is alert and oriented to person, place, and time. Psychiatric:         Behavior: Behavior normal.            Review:      Diagnosis Orders   1. Non-recurrent unilateral inguinal hernia without obstruction or gangrene        Right     Plan: Operative repair of hernia recommended. Laparoscopic, robot assisted vs. Open repair discussed with patient. Has elected to proceed with laparoscopic, robot assisted/possible open repair.   Risks, benefits, options and potential complications of procedure were discussed with patient and they agree to proceed.

## 2020-08-03 NOTE — ANESTHESIA PRE PROCEDURE
Department of Anesthesiology  Preprocedure Note       Name:  Lauren Sabillon   Age:  70 y.o.  :  1940                                          MRN:  [id number]         Date:  8/3/2020      Surgeon: Chanda Pedro):  James Carmona MD    Procedure: Procedure(s): HERNIA INGUINAL REPAIR LAPAROSCOPIC ROBOTIC    Medications prior to admission:   Prior to Admission medications    Medication Sig Start Date End Date Taking?  Authorizing Provider   fluticasone (FLONASE) 50 MCG/ACT nasal spray 1 spray by Each Nostril route daily as needed for Rhinitis    Historical Provider, MD   tamsulosin (FLOMAX) 0.4 MG capsule Take 0.4 mg by mouth nightly    Historical Provider, MD   Calcium Carbonate-Vit D-Min (CALCIUM 1200 PO) Take by mouth    Historical Provider, MD Faith Zingiber officinalis, (GINGER ROOT PO) Take by mouth    Historical Provider, MD   ibuprofen (ADVIL;MOTRIN) 400 MG tablet Take 1 tablet by mouth 3 times daily  Patient taking differently: Take 400 mg by mouth every 8 hours as needed  10/19/17   Nikky Monson MD   Ascorbic Acid (VITAMIN C) 250 MG tablet Take 500 mg by mouth daily    Historical Provider, MD   albuterol sulfate HFA (PROAIR HFA) 108 (90 BASE) MCG/ACT inhaler Inhale 2 puffs into the lungs every 6 hours as needed for Wheezing 17   SHAHZAD Lobo - CNP   b complex vitamins capsule Take 1 capsule by mouth daily    Historical Provider, MD   Glucos-Chond-Hyal Ac-Ca Fructo ([place]) TABS Take by mouth    Historical Provider, MD   aspirin 81 MG tablet Take 81 mg by mouth daily    Historical Provider, MD   coenzyme Q10 100 MG CAPS capsule Take 100 mg by mouth daily    Historical Provider, MD       Current medications:    Current Facility-Administered Medications   Medication Dose Route Frequency Provider Last Rate Last Dose    lactated ringers infusion   Intravenous Continuous Johnie MD Sofy        bupivacaine-EPINEPHrine PF (MARCAINE-w/EPINEPHRINE) 0.5% -[id number] injection             fentaNYL (SUBLIMAZE) 100 MCG/2ML injection             midazolam (VERSED) 2 MG/2ML injection             bupivacaine (PF) (MARCAINE) 0.25 % injection                Allergies:  No Known Allergies    Problem List:    Patient Active Problem List   Diagnosis Code    Hyperlipidemia E78.5    Gastroesophageal reflux disease K21.9    Family history of colon cancer Z80.0    Diverticulosis K57.90    Gastritis K29.70    Diverticulosis of intestine without bleeding K57.90    Gastritis without bleeding K29.70    Dysphagia R13.10    Acute pain of left knee M25.562    Environmental allergies Z91.09    History of prostate cancer Z85.46       Past Medical History:        Diagnosis Date    [facility]) [facility]     Diverticulosis     Gastritis     GERD (gastroesophageal reflux disease)     History of thoracentesis 2005    left    Hyperlipidemia     Multiple lung nodules 2005    left    Prostate cancer ([facility]     Seasonal allergies        Past Surgical History:        Procedure Laterality Date    CARDIAC CATHETERIZATION      no stents reported    CATARACT REMOVAL WITH IMPLANT Bilateral     COLONOSCOPY      done about 10 years ago    COLONOSCOPY  11/10/2016    severe diverticulosis     CYST REMOVAL      groin, left arm    ENDOSCOPY, COLON, DIAGNOSTIC      HEMORRHOID SURGERY      LUNG BIOPSY      left side    OTHER SURGICAL HISTORY      prostate seeding    UPPER GASTROINTESTINAL ENDOSCOPY      done about 10 years ago    UPPER GASTROINTESTINAL ENDOSCOPY  11/10/2016    duodenal bulb polyp, MODERATE GASTRITIS        Social History:    Social History     Tobacco Use    Smoking status: Former Smoker     Packs/day: 1.00     Years: 20.00     Pack years: 20.00     Last attempt to quit: 1985     Years since quittin.6    Smokeless tobacco: Never Used    Tobacco comment: stopped smoking 20 years   Substance Use Topics    Alcohol use:  Yes Comment: daily 2-3 beers                                Counseling given: Not Answered  Comment: stopped smoking 20 years      Vital Signs (Current): There were no vitals filed for this visit. BP Readings from Last 3 Encounters:   07/23/20 124/74   07/01/20 124/68   06/23/20 112/62       NPO Status:                                                                                 BMI:   Wt Readings from Last 3 Encounters:   07/23/20 230 lb (104.3 kg)   07/01/20 230 lb (104.3 kg)   06/23/20 231 lb 6.4 oz (105 kg)     There is no height or weight on file to calculate BMI.    CBC:   Lab Results   Component Value Date    WBC 4.9 07/23/2020    RBC 4.68 07/23/2020    HGB 14.5 07/23/2020    HCT 44.0 07/23/2020    MCV 94.0 07/23/2020    RDW 12.9 07/23/2020     07/23/2020       CMP:   Lab Results   Component Value Date     07/23/2020    K 4.4 07/23/2020     07/23/2020    CO2 25 07/23/2020    BUN 13 07/23/2020    CREATININE 0.93 07/23/2020    GFRAA >60 07/23/2020    LABGLOM >60 07/23/2020    GLUCOSE 82 07/23/2020    CALCIUM 8.5 07/23/2020    BILITOT 1.2 10/31/2017    ALKPHOS 66 10/31/2017    AST 19 10/31/2017    ALT 15 10/31/2017       POC Tests: No results for input(s): POCGLU, POCNA, POCK, POCCL, POCBUN, POCHEMO, POCHCT in the last 72 hours.     Coags: No results found for: PROTIME, INR, APTT    HCG (If Applicable): No results found for: PREGTESTUR, PREGSERUM, HCG, HCGQUANT     ABGs: No results found for: PHART, PO2ART, HVF7TWS, KTQ0SYM, BEART, A0MCWKGV     Type & Screen (If Applicable):  No results found for: LABABO, LABRH    Drug/Infectious Status (If Applicable):  No results found for: HIV, HEPCAB    COVID-19 Screening (If Applicable):   Lab Results   Component Value Date    COVID19 Not Detected 07/30/2020         Anesthesia Evaluation  Patient summary reviewed and Nursing notes reviewed  Airway: Mallampati: I  TM distance: >3 FB   Neck ROM: full  Mouth opening: > = 3 FB Dental: normal exam         Pulmonary:normal exam    (+) COPD:  asthma:                           ROS comment: -QUIT SMOKING 1985 - 20 PACK YEARS  -[id number] CV ROS          ECG reviewed                        Neuro/Psych:   Negative Neuro/Psych ROS              GI/Hepatic/Renal:            ROS comment: -GASTRITIS. Endo/Other:                      ROS comment: -NPO AFTER MIDNIGHT  -NKDA Abdominal:           Vascular: negative vascular ROS. Anesthesia Plan      general and regional     ASA 2     (GETA, TAP BLOCK)  Induction: intravenous. MIPS: Postoperative opioids intended and Prophylactic antiemetics administered. Anesthetic plan and risks discussed with patient. Plan discussed with CRNA.     Attending anesthesiologist reviewed and agrees with Mickey Alejandro MD   8/3/2020

## 2020-08-03 NOTE — PROGRESS NOTES
CLINICAL PHARMACY NOTE: MEDS TO [address] Select Patient?: No  Total # of Prescriptions Filled: 1   The following medications were delivered to the patient:  · norco 5/325  Total # of Interventions Completed: 0  Time Spent (min): 0    Additional Documentation:

## 2020-08-04 NOTE — OP NOTE
Operative Note      Preop diagnosis: Right inguinal hernia    Postop diagnosis: Same    Operation: Laparoscopic robot-assisted repair of right inguinal hernia    Surgeon: Brittani Burnette    Anesthesia: GET    EBL: <5cc    Clinical indications: This patient is a 72-year-old male who was recently evaluated for a symptomatic right inguinal hernia. This had been present for several weeks. He had noted some gradual increase in size and discomfort. Following evaluation laparoscopic robot-assisted repair of the hernia was recommended. Risks, benefits, options and potential complications of the procedure were discussed in detail with the patient and they agree to proceed and consent signed. Details of procedure: The patient was brought to the operating room and placed in the supine position. General anesthesia was induced and the patient was intubated. 2 g Ancef were given IV. The lower abdomen and inguinal region were prepped and draped in usual sterile fashion. A site in the left lateral abdomen was infiltrated with half percent Marcaine. A small incision was made. An 8 mm robotic port was placed through this incision. With laparoscopic guidance this was advanced into the peritoneal cavity. The abdomen was then insufflated to 15 mmHg of carbon dioxide. The camera was introduced. Safe port site entry was confirmed. Following local anesthesia 2 additional robotic ports of 8 mm were placed one at the umbilicus and one in the right lateral abdomen under direct visualization. The patient was then placed in Trendelenburg position. At this point the [employer] I was brought to the field. All ports were docked and instruments introduced in standard fashion. At this point I left the operating table and attended the surgeon console. Upon initial inspection an obvious right inguinal hernia was identified. No hernia was noted on the left.   At this point on the right the peritoneum was incised anterior to the hernia defect starting near the midline. This was carried out laterally to near the  ASIS. The peritoneal flap was then created using sharp dissection as well as cautery and blunt dissection. Brenton's ligament was identified. An indirect hernia sac was also readily identified. This was subsequently reduced from the inguinal canal using traction as well as blunt and sharp dissection with cautery. This was reduced completely to the level of the peritoneum. No other abnormalities were identified. At this point a piece of pro- mesh was folded and brought into the operative field. This was placed overlying the hernia defect as well as potential hernia spaces. This was unfolded and flattened nicely covering all of the noted areas. Hemostasis was noted. The peritoneal flap was then closed with a running 3-0 V lock suture. The [employer] X I was then undocked. The abdomen was desufflated. Skin incisions were closed with subcuticular sutures of 4-0 Vicryl. Sterile dressings were applied. Sponge needle and instrument counts were correct at the end of the procedure. The patient tolerated the procedure well and was transferred to the recovery area in stable condition.

## 2020-08-12 ENCOUNTER — NURSE TRIAGE (OUTPATIENT)
Dept: OTHER | Facility: CLINIC | Age: 80
End: 2020-08-12

## 2020-08-12 NOTE — TELEPHONE ENCOUNTER
Possible has scabies. His back itches. His Daughter is a Nurse Practitioner from out [facility]. He had surgery and his daughter was here to visit. He can not see his back but it itches. He had scabies a few years ago. She did not think it was scabies. The itching started the day of surgery or the day after surgery, it is on his back, he had to lay on his back. He had hernia surgery a week ago. The itching has not gotten any better. Wanting to know if he can get a cream.  The spots are little pin pricks. He can not see his back to see the spots. Rash just on his upper back around shoulders. Denies any rash on his back of his head. Reason for Disposition   Localized rash present > 7 days    Answer Assessment - Initial Assessment Questions  1. APPEARANCE of RASH: \"Describe the rash. \"       Possible has scabies. His back itches. His Daughter is a Nurse Practitioner from out [facility]. He had surgery and his daughter was here to visit. He can not see his back but it itches. He had scabies a few years ago. She did not think it was scabies. The itching started the day of surgery or the day after surgery, it is on his back, he had to lay on his back. He had hernia surgery a week ago. The itching has not gotten any better. Wanting to know if he can get a cream.  The spots are little pin pricks. He can not see his back to see the spots. Rash just on his upper back around shoulders. Denies any rash on his back of his head. 2. LOCATION: \"Where is the rash located?\"         3. NUMBER: \"How many spots are there?\"         4. SIZE: \"How big are the spots? \" (Inches, centimeters or compare to size of a coin)         5. ONSET: \"When did the rash start?\"         6. ITCHING: \"Does the rash itch? \" If so, ask: \"How bad is the itch? \"  (Scale 1-10; or mild, moderate, severe)      5/10, it is not constant. It does not keep him up at night. 7. PAIN: \"Does the rash hurt? \" If so, ask: \"How bad is the pain? \" (Scale 1-10; or mild, moderate, severe)         8. OTHER SYMPTOMS: \"Do you have any other symptoms? \" (e.g., fever)      No   9. PREGNANCY: \"Is there any chance you are pregnant? \" \"When was your last menstrual period? \"      N/a    Protocols used: RASH OR REDNESS - LOCALIZED-ADULT-[place]    Pod 1    Received call from [address]. Call soft transferred to [address] to schedule appointment. Please do not reply to the triage nurse through this encounter. Any subsequent communication should be directly with the patient.

## 2020-08-13 ENCOUNTER — OFFICE VISIT (OUTPATIENT)
Dept: PRIMARY CARE CLINIC | Age: 80
End: 2020-08-13
Payer: MEDICARE

## 2020-08-13 VITALS
DIASTOLIC BLOOD PRESSURE: 62 MMHG | BODY MASS INDEX: 29.95 KG/M2 | TEMPERATURE: 97.2 F | SYSTOLIC BLOOD PRESSURE: 110 MMHG | WEIGHT: 227 LBS

## 2020-08-13 PROCEDURE — 4040F PNEUMOC VAC/ADMIN/RCVD: CPT | Performed by: NURSE PRACTITIONER

## 2020-08-13 PROCEDURE — 99213 OFFICE O/P EST LOW 20 MIN: CPT | Performed by: NURSE PRACTITIONER

## 2020-08-13 PROCEDURE — 1036F TOBACCO NON-USER: CPT | Performed by: NURSE PRACTITIONER

## 2020-08-13 PROCEDURE — G8417 CALC BMI ABV UP PARAM F/U: HCPCS | Performed by: NURSE PRACTITIONER

## 2020-08-13 PROCEDURE — G8427 DOCREV CUR MEDS BY ELIG CLIN: HCPCS | Performed by: NURSE PRACTITIONER

## 2020-08-13 PROCEDURE — 1123F ACP DISCUSS/DSCN MKR DOCD: CPT | Performed by: NURSE PRACTITIONER

## 2020-08-13 RX ORDER — PREDNISONE 20 MG/1
20 TABLET ORAL 2 TIMES DAILY
Qty: 10 TABLET | Refills: 0 | Status: SHIPPED | OUTPATIENT
Start: 2020-08-13 | End: 2020-08-18

## 2020-08-13 ASSESSMENT — ENCOUNTER SYMPTOMS
CONSTIPATION: 0
DIARRHEA: 0
BLOOD IN STOOL: 0
VOMITING: 0
TROUBLE SWALLOWING: 0
SORE THROAT: 0
COUGH: 0
NAUSEA: 0
ABDOMINAL PAIN: 0
SINUS PRESSURE: 0
SHORTNESS OF BREATH: 0
WHEEZING: 0

## 2020-08-13 NOTE — PROGRESS NOTES
thoracentesis 2005    left    Hyperlipidemia     Inguinal hernia 2020    right    Multiple lung nodules 2005    left    Prostate cancer ([address]     Seasonal allergies       Past Surgical History:   Procedure Laterality Date    CARDIAC CATHETERIZATION      no stents reported    CATARACT REMOVAL WITH IMPLANT Bilateral     COLONOSCOPY      done about 10 years ago    COLONOSCOPY  11/10/2016    severe diverticulosis     CYST REMOVAL      groin, left arm    ENDOSCOPY, COLON, DIAGNOSTIC      HEMORRHOID SURGERY      HERNIA REPAIR Right 8/3/2020    HERNIA INGUINAL REPAIR LAPAROSCOPIC ROBOTIC performed by Renee Barr MD at [address] [place] [address] Right 2020    Robotic Lap.  Rt Inguinal Hernia Repair with Mesh    LUNG BIOPSY  2005    left side    OTHER SURGICAL HISTORY      prostate seeding    UPPER GASTROINTESTINAL ENDOSCOPY      done about 10 years ago    UPPER GASTROINTESTINAL ENDOSCOPY  11/10/2016    duodenal bulb polyp, MODERATE GASTRITIS        Family History   Problem Relation Age of Onset    Cancer Mother     Cancer Father         colon          Social History     Tobacco Use    Smoking status: Former Smoker     Packs/day: 1.00     Years: 20.00     Pack years: 20.00     Last attempt to quit: 1985     Years since quittin.6    Smokeless tobacco: Never Used    Tobacco comment: stopped smoking 20 years   Substance Use Topics    Alcohol use: Yes     Comment: daily 2-3 beers      Current Outpatient Medications   Medication Sig Dispense Refill    predniSONE (DELTASONE) 20 MG tablet Take 1 tablet by mouth 2 times daily for 5 days 10 tablet 0    fluticasone (FLONASE) 50 MCG/ACT nasal spray 1 spray by Each Nostril route daily as needed for Rhinitis      tamsulosin (FLOMAX) 0.4 MG capsule Take 0.4 mg by mouth nightly      Calcium Carbonate-Vit D-Min (CALCIUM 1200 PO) Take by mouth      Ginger, Zingiber officinalis, (GINGER ROOT PO) Take by mouth      ibuprofen (ADVIL;MOTRIN) 400 MG tablet Take 1 tablet by mouth 3 times daily (Patient taking differently: Take 400 mg by mouth every 8 hours as needed ) 30 tablet 1    Ascorbic Acid (VITAMIN C) 250 MG tablet Take 500 mg by mouth daily      albuterol sulfate HFA (PROAIR HFA) 108 (90 BASE) MCG/ACT inhaler Inhale 2 puffs into the lungs every 6 hours as needed for Wheezing 1 Inhaler 5    b complex vitamins capsule Take 1 capsule by mouth daily      Glucos-Chond-Hyal Ac-Ca Fructo ([employer] HE[employer]VANCE) TABS Take by mouth      aspirin 81 MG tablet Take 81 mg by mouth daily      coenzyme Q10 100 MG CAPS capsule Take 100 mg by mouth daily       No current facility-administered medications for this visit. No Known Allergies    Health Maintenance   Topic Date Due    PSA counseling  11/05/1992    Shingles Vaccine (2 of 3) 07/21/2012    Annual Wellness Visit (AWV)  05/29/2019    Flu vaccine (1) 09/01/2020    DTaP/Tdap/Td vaccine (2 - Td) 01/13/2028    Pneumococcal 65+ years Vaccine  Completed    Hepatitis A vaccine  Aged Out    Hepatitis B vaccine  Aged Out    Hib vaccine  Aged Out    Meningococcal (ACWY) vaccine  Aged Out       Subjective:      Review of Systems   Constitutional: Negative for activity change, appetite change, chills, fatigue, fever and unexpected weight change. HENT: Negative for congestion, ear pain, hearing loss, sinus pressure, sore throat and trouble swallowing. Eyes: Negative for visual disturbance. Respiratory: Negative for cough, shortness of breath and wheezing. Cardiovascular: Negative for chest pain, palpitations and leg swelling. Gastrointestinal: Negative for abdominal pain, blood in stool, constipation, diarrhea, nausea and vomiting. Endocrine: Negative for cold intolerance, heat intolerance, polydipsia, polyphagia and polyuria. Genitourinary: Negative for difficulty urinating, frequency, hematuria and urgency.    Musculoskeletal: Negative for by mouth 2 times daily for 5 days     Dispense:  10 tablet     Refill:  0       Patient given educational materials - see patient instructions. Discussed use, benefit, and side effects of prescribed medications. All patientquestions answered. Pt voiced understanding. Reviewed health maintenance. Instructedto continue current medications, diet and exercise. Patient agreed with treatmentplan. Follow up as directed.      Electronicallysigned by SHAHZAD Haynes CNP on 8/13/2020 at 3:41 PM

## 2020-08-18 ENCOUNTER — OFFICE VISIT (OUTPATIENT)
Dept: SURGERY | Age: 80
End: 2020-08-18

## 2020-08-18 VITALS
SYSTOLIC BLOOD PRESSURE: 118 MMHG | OXYGEN SATURATION: 97 % | HEART RATE: 64 BPM | HEIGHT: 73 IN | DIASTOLIC BLOOD PRESSURE: 70 MMHG | BODY MASS INDEX: 30.35 KG/M2 | WEIGHT: 229 LBS

## 2020-08-18 PROCEDURE — 99024 POSTOP FOLLOW-UP VISIT: CPT | Performed by: SURGERY

## 2020-08-18 NOTE — PROGRESS NOTES
[facility]  [address]     Matias Dudley   70 y.o.  1940  [id number]     Author: Smith Dangelo MD    Reason for Visit:  Chief Complaint   Patient presents with   [facility] Post-Op Check     Patient states he is here for a post-op follow-up from inguinal hernia repair. /70 (Site: Left Upper Arm, Position: Sitting, Cuff Size: Medium Adult)   Pulse 64   Ht 6' 1\" (1.854 m)   Wt 229 lb (103.9 kg)   SpO2 97%   BMI 30.21 kg/m²     Post-Operative Information:  Matias Dudley is 13 days status post laparoscopic robot assisted right inguinal hernia repair. Denies any significant problems and is resuming activities. Review of Systems:  Gastrointestinal:  Negative          Genitourinary: Negative    Physical Examination:  Abdomen is soft. Incisions are healing nicely. There are no signs of hernia recurrence. Review/Management:  Course:  Progressing as expected    Impression and Plan:  Diagnoses:   Diagnosis Orders   1. S/P hernia repair          Patient Instructions/Follow up: May Increase activities as tolerated and will see PRN.     Electronically signed by Carlos Alberto Giron MD on 8/18/20 at 9:49 AM EDT

## 2020-12-29 ENCOUNTER — HOSPITAL ENCOUNTER (OUTPATIENT)
Dept: CT IMAGING | Age: 80
Discharge: HOME | End: 2020-12-31
Payer: MEDICARE

## 2020-12-29 ENCOUNTER — HOSPITAL ENCOUNTER (OUTPATIENT)
Age: 80
Setting detail: SPECIMEN
Discharge: HOME | End: 2020-12-29
Payer: MEDICARE

## 2020-12-29 ENCOUNTER — OFFICE VISIT (OUTPATIENT)
Dept: PRIMARY CARE CLINIC | Age: 80
End: 2020-12-29
Payer: MEDICARE

## 2020-12-29 VITALS
WEIGHT: 239.6 LBS | HEART RATE: 68 BPM | BODY MASS INDEX: 31.75 KG/M2 | SYSTOLIC BLOOD PRESSURE: 118 MMHG | DIASTOLIC BLOOD PRESSURE: 78 MMHG | HEIGHT: 73 IN | OXYGEN SATURATION: 96 % | RESPIRATION RATE: 12 BRPM

## 2020-12-29 DIAGNOSIS — R19.5 LOOSE STOOLS: ICD-10-CM

## 2020-12-29 DIAGNOSIS — R19.8 CHANGE IN BOWEL FUNCTION: ICD-10-CM

## 2020-12-29 DIAGNOSIS — Z87.19 STATUS POST HERNIA REPAIR: ICD-10-CM

## 2020-12-29 DIAGNOSIS — Z98.890 STATUS POST HERNIA REPAIR: ICD-10-CM

## 2020-12-29 LAB
ESTIMATED AVERAGE GLUCOSE: 111 MG/DL
HBA1C MFR BLD: 5.5 % (ref 4–6)
PROSTATE SPECIFIC ANTIGEN: <0.01 UG/L

## 2020-12-29 PROCEDURE — 1036F TOBACCO NON-USER: CPT | Performed by: NURSE PRACTITIONER

## 2020-12-29 PROCEDURE — 74176 CT ABD & PELVIS W/O CONTRAST: CPT

## 2020-12-29 PROCEDURE — 99214 OFFICE O/P EST MOD 30 MIN: CPT | Performed by: NURSE PRACTITIONER

## 2020-12-29 PROCEDURE — G8427 DOCREV CUR MEDS BY ELIG CLIN: HCPCS | Performed by: NURSE PRACTITIONER

## 2020-12-29 PROCEDURE — 4040F PNEUMOC VAC/ADMIN/RCVD: CPT | Performed by: NURSE PRACTITIONER

## 2020-12-29 PROCEDURE — G8482 FLU IMMUNIZE ORDER/ADMIN: HCPCS | Performed by: NURSE PRACTITIONER

## 2020-12-29 PROCEDURE — 1123F ACP DISCUSS/DSCN MKR DOCD: CPT | Performed by: NURSE PRACTITIONER

## 2020-12-29 PROCEDURE — G8417 CALC BMI ABV UP PARAM F/U: HCPCS | Performed by: NURSE PRACTITIONER

## 2020-12-29 SDOH — ECONOMIC STABILITY: INCOME INSECURITY: HOW HARD IS IT FOR YOU TO PAY FOR THE VERY BASICS LIKE FOOD, HOUSING, MEDICAL CARE, AND HEATING?: NOT HARD AT ALL

## 2020-12-29 SDOH — ECONOMIC STABILITY: TRANSPORTATION INSECURITY
IN THE PAST 12 MONTHS, HAS LACK OF TRANSPORTATION KEPT YOU FROM MEETINGS, WORK, OR FROM GETTING THINGS NEEDED FOR DAILY LIVING?: NO

## 2020-12-29 SDOH — ECONOMIC STABILITY: FOOD INSECURITY: WITHIN THE PAST 12 MONTHS, YOU WORRIED THAT YOUR FOOD WOULD RUN OUT BEFORE YOU GOT MONEY TO BUY MORE.: NEVER TRUE

## 2020-12-29 SDOH — ECONOMIC STABILITY: FOOD INSECURITY: WITHIN THE PAST 12 MONTHS, THE FOOD YOU BOUGHT JUST DIDN'T LAST AND YOU DIDN'T HAVE MONEY TO GET MORE.: NEVER TRUE

## 2020-12-29 SDOH — ECONOMIC STABILITY: TRANSPORTATION INSECURITY
IN THE PAST 12 MONTHS, HAS THE LACK OF TRANSPORTATION KEPT YOU FROM MEDICAL APPOINTMENTS OR FROM GETTING MEDICATIONS?: NO

## 2020-12-29 ASSESSMENT — ENCOUNTER SYMPTOMS
COUGH: 0
WHEEZING: 0
NAUSEA: 0
VOMITING: 0
ABDOMINAL PAIN: 0
CONSTIPATION: 0
SHORTNESS OF BREATH: 0
DIARRHEA: 0
SORE THROAT: 0
SINUS PRESSURE: 0
BLOOD IN STOOL: 0
TROUBLE SWALLOWING: 0

## 2020-12-29 NOTE — PROGRESS NOTES
[facility] PRIMARY CARE  Talib [address]   [address]  [address] [address]  Dept: [phone]  Dept Fax: [phone]    Romel Steel is a 68 y.o. male who presentstoday for his medical conditions/complaints as noted below. Romel Steel is c/o of  Chief Complaint   Patient presents with    6 Month Follow-Up    Hyperlipidemia    Discuss Labs       HPI:     Here today for follow up  Reports concern about change in his bowel pattern since his hernia repair this past August  Has been having frequent loose stools rather than 1 BM daily which was his previous pattern  He denies constipation, denies abd pain  He states \"I just do not feel like I empty\"  He feels well in general  As scheduled reviewed recent health screening labs that he had done at [facility]    Hyperlipidemia  This is a chronic problem. The current episode started more than 1 year ago. The problem is controlled. There are no known factors aggravating his hyperlipidemia. Pertinent negatives include no chest pain, myalgias or shortness of breath. Current antihyperlipidemic treatment includes herbal therapy. There are no compliance problems.         No results found for: LABA1C          ( goal A1C is < 7)   No results found for: LABMICR  LDL Calculated (mg/dL)   Date Value   10/31/2017 153   09/28/2016 101       (goal LDL is <100)   AST (U/L)   Date Value   10/31/2017 19     ALT (U/L)   Date Value   10/31/2017 15     BUN (mg/dL)   Date Value   07/23/2020 13     BP Readings from Last 3 Encounters:   12/29/20 118/78   08/18/20 118/70   08/13/20 110/62          ([id number])    Past Medical History:   Diagnosis Date    COPD (chronic obstructive pulmonary disease) (HCC)     Diverticulosis     Gastritis     GERD (gastroesophageal reflux disease)     History of thoracentesis 2005    left    Hyperlipidemia     Inguinal hernia 08/03/2020    right    Multiple lung nodules 2005    left  Prostate cancer ([facility] [address]     Seasonal allergies       Past Surgical History:   Procedure Laterality Date    CARDIAC CATHETERIZATION      no stents reported    CATARACT REMOVAL WITH IMPLANT Bilateral     COLONOSCOPY      done about 10 years ago    COLONOSCOPY  11/10/2016    severe diverticulosis     CYST REMOVAL      groin, left arm    ENDOSCOPY, COLON, DIAGNOSTIC      HEMORRHOID SURGERY      HERNIA REPAIR Right 8/3/2020    HERNIA INGUINAL REPAIR LAPAROSCOPIC ROBOTIC performed by Nona Moseley MD at [address] [place] [address] Right 2020    Robotic Lap.  Rt Inguinal Hernia Repair with Mesh    LUNG BIOPSY      left side    OTHER SURGICAL HISTORY      prostate seeding    UPPER GASTROINTESTINAL ENDOSCOPY      done about 10 years ago    UPPER GASTROINTESTINAL ENDOSCOPY  11/10/2016    duodenal bulb polyp, MODERATE GASTRITIS        Family History   Problem Relation Age of Onset    Cancer Mother     Cancer Father         colon          Social History     Tobacco Use    Smoking status: Former Smoker     Packs/day: 1.00     Years: 20.00     Pack years: 20.00     Quit date:      Years since quittin.0    Smokeless tobacco: Never Used    Tobacco comment: stopped smoking 20 years   Substance Use Topics    Alcohol use: Yes     Comment: daily 2-3 beers      Current Outpatient Medications   Medication Sig Dispense Refill    fluticasone (FLONASE) 50 MCG/ACT nasal spray 1 spray by Each Nostril route daily as needed for Rhinitis      tamsulosin (FLOMAX) 0.4 MG capsule Take 0.4 mg by mouth nightly      Calcium Carbonate-Vit D-Min (CALCIUM 1200 PO) Take by mouth      Ginger, Zingiber officinalis, (GINGER ROOT PO) Take by mouth      ibuprofen (ADVIL;MOTRIN) 400 MG tablet Take 1 tablet by mouth 3 times daily (Patient taking differently: Take 400 mg by mouth every 8 hours as needed ) 30 tablet 1    Ascorbic Acid (VITAMIN C) 250 MG tablet Take 500 mg by mouth daily  albuterol sulfate HFA (PROAIR HFA) 108 (90 BASE) MCG/ACT inhaler Inhale 2 puffs into the lungs every 6 hours as needed for Wheezing 1 Inhaler 5    b complex vitamins capsule Take 1 capsule by mouth daily      Glucos-Chond-Hyal Ac-Ca Fructo (MOVE FREE JOINT HEALTH ADVANCE) TABS Take by mouth      aspirin 81 MG tablet Take 81 mg by mouth daily      coenzyme Q10 100 MG CAPS capsule Take 100 mg by mouth daily       No current facility-administered medications for this visit. No Known Allergies    Health Maintenance   Topic Date Due    Hepatitis C screen  1940    PSA counseling  11/05/1992    Shingles Vaccine (2 of 3) 07/21/2012    Annual Wellness Visit (AWV)  05/29/2019    DTaP/Tdap/Td vaccine (2 - Td) 01/13/2028    Flu vaccine  Completed    Pneumococcal 65+ years Vaccine  Completed    Hepatitis A vaccine  Aged Out    Hepatitis B vaccine  Aged Out    Hib vaccine  Aged Out    Meningococcal (ACWY) vaccine  Aged Out       Subjective:      Review of Systems   Constitutional: Negative for activity change, appetite change, chills, fatigue, fever and unexpected weight change. HENT: Negative for congestion, ear pain, hearing loss, sinus pressure, sore throat and trouble swallowing. Eyes: Negative for visual disturbance. Respiratory: Negative for cough, shortness of breath and wheezing. Cardiovascular: Negative for chest pain, palpitations and leg swelling. Gastrointestinal: Negative for abdominal pain, blood in stool, constipation, diarrhea, nausea and vomiting. Frequent loose stools   Endocrine: Negative for cold intolerance, heat intolerance, polydipsia, polyphagia and polyuria. Genitourinary: Negative for difficulty urinating, frequency, hematuria and urgency. Musculoskeletal: Negative for arthralgias and myalgias. Skin: Negative for rash. Allergic/Immunologic: Negative for environmental allergies. Elevated cholesterolreviewed and discussed recent lipid panel done at screening, mild elevation in total cholesterol, he has been stable for many years with use of over-the-counter agents. Reviewed healthy diet choices and benefits of regular exercise/activity  Change in bowel function, loose stools status post hernia repairdiscussed CT versus colonoscopy, agreeable to start with CT scan as suspect constipation  Elevated glucosescreening glucose 116, will check A1c  History of prostate cancerno recent PSA, has not seen urology for over 1 year, will check screening PSA    Orders Placed This Encounter   Procedures    CT ABDOMEN PELVIS WO CONTRAST Additional Contrast? None     Standing Status:   Future     Number of Occurrences:   1     Standing Expiration Date:   12/29/2021     Order Specific Question:   Additional Contrast?     Answer:   None     Order Specific Question:   Reason for exam:     Answer:   change in bowel pattern    Hemoglobin A1C     Standing Status:   Future     Number of Occurrences:   1     Standing Expiration Date:   12/29/2021    Psa screening     Standing Status:   Future     Number of Occurrences:   1     Standing Expiration Date:   12/29/2021          Patient given educational materials - see patient instructions. Discussed use, benefit, and side effects of prescribed medications. All patientquestions answered. Pt voiced understanding. Reviewed health maintenance. Instructedto continue current medications, diet and exercise. Patient agreed with treatmentplan. Follow up as directed.      Electronicallysigned by SHAHZAD Martinez CNP on 12/29/2020 at 12:04 PM

## 2021-01-06 ENCOUNTER — TELEPHONE (OUTPATIENT)
Dept: PRIMARY CARE CLINIC | Age: 81
End: 2021-01-06

## 2021-01-06 NOTE — TELEPHONE ENCOUNTER
PT called and asked to speak to Debbi Brenner. Advised him she is not here today and if I could help. He said he would really like to talk to her about it bc it gets messed up in transition. Said if she can't call today she can call tomorrow. Its about a pulmonary issue, he got reports from past procedures and would like to talk to you about it before going to next step.  Please advice, thank you

## 2021-01-07 NOTE — TELEPHONE ENCOUNTER
Spoke with patient on phone regarding finding of left lower lobe nodule on CT abdomen 12-29. He has past record dating as back to 2004 where a left lung nodule was found. He reports it was 5 mm at the time. Prior to establishing care in this office he has had this followed and it has not changed to his knowledge until now. He states that he even had biopsy and this was negative but this was several years ago. After some discussion he agreed to proceed with CT chest given his past history of prostate cancer and history of smoking, also explained the nodule has slightly enlarged.   Will have office assist him to schedule

## 2021-01-07 NOTE — TELEPHONE ENCOUNTER
Patient called in again requesting phone call from you about the pulmonary issues. Do you want me to do a VV special use for you with him?

## 2021-01-08 ENCOUNTER — TELEPHONE (OUTPATIENT)
Dept: PRIMARY CARE CLINIC | Age: 81
End: 2021-01-08

## 2021-01-08 NOTE — TELEPHONE ENCOUNTER
LVM informing pt that CT of Chest was scheduled for 01/09/21 at 2 pm at [address] [facility] to arrived 15 minutes early and to have his photo ID and insurance card. Advised pt if he has any questions to contact the office.

## 2021-01-09 ENCOUNTER — HOSPITAL ENCOUNTER (OUTPATIENT)
Dept: CT IMAGING | Age: 81
Discharge: HOME | End: 2021-01-11
Payer: MEDICARE

## 2021-01-09 DIAGNOSIS — R91.1 INCIDENTAL LUNG NODULE, > 3MM AND < 8MM: ICD-10-CM

## 2021-01-09 PROCEDURE — 71250 CT THORAX DX C-: CPT

## 2021-01-11 DIAGNOSIS — R91.1 NODULE OF LOWER LOBE OF LEFT LUNG: Primary | ICD-10-CM

## 2021-05-26 ENCOUNTER — VIRTUAL VISIT (OUTPATIENT)
Dept: PRIMARY CARE CLINIC | Age: 81
End: 2021-05-26
Payer: MEDICARE

## 2021-05-26 ENCOUNTER — NURSE TRIAGE (OUTPATIENT)
Dept: OTHER | Facility: CLINIC | Age: 81
End: 2021-05-26

## 2021-05-26 DIAGNOSIS — J01.90 ACUTE SINUSITIS, RECURRENCE NOT SPECIFIED, UNSPECIFIED LOCATION: Primary | ICD-10-CM

## 2021-05-26 PROCEDURE — 99442 PR PHYS/QHP TELEPHONE EVALUATION 11-20 MIN: CPT | Performed by: NURSE PRACTITIONER

## 2021-05-26 RX ORDER — SOLIFENACIN SUCCINATE 10 MG/1
10 TABLET, FILM COATED ORAL DAILY
COMMUNITY
Start: 2021-05-05

## 2021-05-26 RX ORDER — AZITHROMYCIN 250 MG/1
250 TABLET, FILM COATED ORAL SEE ADMIN INSTRUCTIONS
Qty: 6 TABLET | Refills: 0 | Status: SHIPPED | OUTPATIENT
Start: 2021-05-26 | End: 2021-05-31

## 2021-05-26 ASSESSMENT — PATIENT HEALTH QUESTIONNAIRE - PHQ9
SUM OF ALL RESPONSES TO PHQ QUESTIONS 1-9: 0
SUM OF ALL RESPONSES TO PHQ9 QUESTIONS 1 & 2: 0
2. FEELING DOWN, DEPRESSED OR HOPELESS: 0
1. LITTLE INTEREST OR PLEASURE IN DOING THINGS: 0
SUM OF ALL RESPONSES TO PHQ QUESTIONS 1-9: 0

## 2021-05-26 NOTE — TELEPHONE ENCOUNTER
Received call from [address] at [facility] with [employer] Complaint. Brief description of triage: fatigue, HA, pain under right eye since last Friday    Triage indicates for patient to be seen today     Care advice provided, patient verbalizes understanding; denies any other questions or concerns; instructed to call back for any new or worsening symptoms. Writer provided warm transfer to [facility] for appointment scheduling. Attention Provider: Thank you for allowing me to participate in the care of your patient. The patient was connected to triage in response to information provided to the [facility]. Please do not respond through this encounter as the response is not directed to a shared pool. Reason for Disposition   Patient wants to be seen    Answer Assessment - Initial Assessment Questions  1. ONSET: \"When did the pain start? \" (e.g., minutes, hours, days)      Last Friday    2. ONSET: \"Does the pain come and go, or has it been constant since it started? \" (e.g., constant, intermittent, fleeting)      Comes and goes    3. SEVERITY: \"How bad is the pain? \"   (Scale 1-10; mild, moderate or severe)    - MILD (1-3): doesn't interfere with normal activities     - MODERATE (4-7): interferes with normal activities or awakens from sleep     - SEVERE (8-10): excruciating pain, unable to do any normal activities       It is low now because I took some so Tylenol    4. LOCATION: \"Where does it hurt? \"       Under right eye    5. RASH: \"Is there any redness, rash, or swelling of the face? \"      Denies    6. FEVER: \"Do you have a fever? \" If so, ask: \"What is it, how was it measured, and when did it start? \"       I did on Sunday    7. OTHER SYMPTOMS: \"Do you have any other symptoms? \" (e.g., fever, toothache, nasal discharge, nasal congestion, clicking sensation in jaw joint)      Fatigue, HA    8. PREGNANCY: \"Is there any chance you are pregnant? \" \"When was your last menstrual period? \" NA    Protocols used: FACE PAIN-ADULT-[place]

## 2021-05-26 NOTE — PROGRESS NOTES
Gio Lyle is a 68 y.o. male evaluated via telephone on 5/26/2021. Consent:  He and/or health care decision maker is aware that that he may receive a bill for this telephone service, depending on his insurance coverage, and has provided verbal consent to proceed: Yes      Documentation:  I communicated with the patient and/or health care decision maker about their health. Details of this discussion including any medical advice provided: to the patient      Pt present for a telephone visit. Pt of bernadette COLON  bp jean  Weight JEAN    He c/o a sinus problem. He usually gets this every spring d/t allergies. Sx started Friday. He thought he had a tooth issue. He went to the dentist and he had xrays on Tuesday. Sensitive but no other issues. Pain is under his right eye. He has been using flonase and allergy medication and motrin with mild relief. Pain doesn't seem to go away. He has had an intermittent headache. No headache currently. Motrin usually relieves. No dizziness or change to vision  Sunday he had a fever. Occasional cough and sneezing. He feels tired. He does use a anastacia pot daily    ROS: negative except for as stated in HPI  Exam: jean  History: reviewed    A/p:   1. Acute sinusitis  -rx for zpack. Discussed with the patient. C/w current medications. F/u PRN. I affirm this is a Patient Initiated Episode with a Patient who has not had a related appointment within my department in the past 7 days or scheduled within the next 24 hours. Patient identification was verified at the start of the visit: Yes    Total Time: minutes: 11-20 minutes    The visit was conducted pursuant to the emergency declaration under the [facility], [address] [place] [address] authority and the [email or web address] and [email or web address] Act. Patient identification was verified, and a caregiver was present when appropriate.  The patient was located in a state where the

## 2021-07-09 ENCOUNTER — OFFICE VISIT (OUTPATIENT)
Dept: PRIMARY CARE CLINIC | Age: 81
End: 2021-07-09
Payer: MEDICARE

## 2021-07-09 VITALS
OXYGEN SATURATION: 98 % | BODY MASS INDEX: 31.56 KG/M2 | RESPIRATION RATE: 20 BRPM | HEART RATE: 64 BPM | DIASTOLIC BLOOD PRESSURE: 68 MMHG | SYSTOLIC BLOOD PRESSURE: 118 MMHG | WEIGHT: 239.2 LBS

## 2021-07-09 DIAGNOSIS — Z85.46 HISTORY OF PROSTATE CANCER: ICD-10-CM

## 2021-07-09 DIAGNOSIS — K21.9 GASTROESOPHAGEAL REFLUX DISEASE WITHOUT ESOPHAGITIS: ICD-10-CM

## 2021-07-09 DIAGNOSIS — E78.00 PURE HYPERCHOLESTEROLEMIA: Primary | ICD-10-CM

## 2021-07-09 DIAGNOSIS — Z13.0 SCREENING, ANEMIA, DEFICIENCY, IRON: ICD-10-CM

## 2021-07-09 PROBLEM — M25.562 ACUTE PAIN OF LEFT KNEE: Status: RESOLVED | Noted: 2017-09-29 | Resolved: 2021-07-09

## 2021-07-09 PROCEDURE — 4040F PNEUMOC VAC/ADMIN/RCVD: CPT | Performed by: NURSE PRACTITIONER

## 2021-07-09 PROCEDURE — 1036F TOBACCO NON-USER: CPT | Performed by: NURSE PRACTITIONER

## 2021-07-09 PROCEDURE — G8427 DOCREV CUR MEDS BY ELIG CLIN: HCPCS | Performed by: NURSE PRACTITIONER

## 2021-07-09 PROCEDURE — 1123F ACP DISCUSS/DSCN MKR DOCD: CPT | Performed by: NURSE PRACTITIONER

## 2021-07-09 PROCEDURE — 99214 OFFICE O/P EST MOD 30 MIN: CPT | Performed by: NURSE PRACTITIONER

## 2021-07-09 PROCEDURE — G8417 CALC BMI ABV UP PARAM F/U: HCPCS | Performed by: NURSE PRACTITIONER

## 2021-07-09 ASSESSMENT — ENCOUNTER SYMPTOMS
TROUBLE SWALLOWING: 0
COUGH: 0
NAUSEA: 0
SHORTNESS OF BREATH: 0
VOMITING: 0
SORE THROAT: 0
ABDOMINAL PAIN: 0
CONSTIPATION: 0
BLOOD IN STOOL: 0
SINUS PRESSURE: 0
DIARRHEA: 0
WHEEZING: 0

## 2021-07-09 NOTE — PROGRESS NOTES
[facility] PRIMARY CARE  Tal[address]esh [address]   [address]  [address] [address]  Dept: [phone]  Dept Fax: [phone]    Ewa Prakash is a 68 y.o. male who presentstoday for his medical conditions/complaints as noted below.   Ewa Prakash is c/o of  Chief Complaint   Patient presents with    Follow-up         HPI:     Here today for follow up  He reports had sinsu infection a couple months ago, was treated with atbx and has resolved    Asking about intermittent tingling in his left leg  Notices mainly at night  Over the past week he has not noticed it happening  He does have chronic left knee arthritis that is intermittently bothersome    Asking about repeating his lipid panel as has been off his statin now for several years      Hemoglobin A1C (%)   Date Value   12/29/2020 5.5             ( goal A1C is < 7)   No results found for: LABMICR  LDL Calculated (mg/dL)   Date Value   10/31/2017 153   09/28/2016 101       (goal LDL is <100)   AST (U/L)   Date Value   10/31/2017 19     ALT (U/L)   Date Value   10/31/2017 15     BUN (mg/dL)   Date Value   07/23/2020 13     BP Readings from Last 3 Encounters:   07/09/21 118/68   12/29/20 118/78   08/18/20 118/70          ([id number])    Past Medical History:   Diagnosis Date    COPD (chronic obstructive pulmonary disease) ([address]     Diverticulosis     Gastritis     GERD (gastroesophageal reflux disease)     History of thoracentesis 2005    left    Hyperlipidemia     Inguinal hernia 08/03/2020    right    Multiple lung nodules 2005    left    Prostate cancer ([address]     Seasonal allergies       Past Surgical History:   Procedure Laterality Date    CARDIAC CATHETERIZATION  2005    no stents reported    CATARACT REMOVAL WITH IMPLANT Bilateral     COLONOSCOPY      done about 10 years ago    COLONOSCOPY  11/10/2016    severe diverticulosis     CYST REMOVAL      groin, left arm    ENDOSCOPY, COLON, DIAGNOSTIC      09/01/2021    PSA counseling  12/29/2021    DTaP/Tdap/Td vaccine (2 - Td or Tdap) 01/13/2028    Pneumococcal 65+ years Vaccine  Completed    Hepatitis A vaccine  Aged Out    Hepatitis B vaccine  Aged Out    Hib vaccine  Aged Out    Meningococcal (ACWY) vaccine  Aged Out    Hepatitis C screen  Discontinued       Subjective:      Review of Systems   Constitutional: Negative for activity change, appetite change, chills, fatigue, fever and unexpected weight change. HENT: Negative for congestion, ear pain, hearing loss, sinus pressure, sore throat and trouble swallowing. Eyes: Negative for visual disturbance. Respiratory: Negative for cough, shortness of breath and wheezing. Cardiovascular: Negative for chest pain, palpitations and leg swelling. Gastrointestinal: Negative for abdominal pain, blood in stool, constipation, diarrhea, nausea and vomiting. Endocrine: Negative for cold intolerance, heat intolerance, polydipsia, polyphagia and polyuria. Genitourinary: Negative for difficulty urinating, frequency, hematuria and urgency. Musculoskeletal: Positive for arthralgias (left knee). Negative for myalgias. Skin: Negative for rash. Allergic/Immunologic: Negative for environmental allergies. Neurological: Negative for dizziness, weakness, light-headedness and headaches. Psychiatric/Behavioral: Negative for confusion. The patient is not nervous/anxious. Objective:     Physical Exam  Constitutional:       Appearance: He is well-developed. HENT:      Head: Normocephalic. Eyes:      Conjunctiva/sclera: Conjunctivae normal.      Pupils: Pupils are equal, round, and reactive to light. Cardiovascular:      Rate and Rhythm: Normal rate and regular rhythm. Heart sounds: Normal heart sounds. No murmur heard. Pulmonary:      Effort: Pulmonary effort is normal.      Breath sounds: Normal breath sounds. No wheezing.    Abdominal:      General: Bowel sounds are normal. There is no distension. Palpations: Abdomen is soft. Musculoskeletal:         General: Normal range of motion. Cervical back: Normal range of motion. Skin:     General: Skin is warm and dry. Neurological:      Mental Status: He is alert and oriented to person, place, and time. Psychiatric:         Behavior: Behavior normal.         Thought Content: Thought content normal.         Judgment: Judgment normal.       /68   Pulse 64   Resp 20   Wt 239 lb 3.2 oz (108.5 kg)   SpO2 98%   BMI 31.56 kg/m²     Assessment:       Diagnosis Orders   1. Pure hypercholesterolemia  Lipid Panel    Comprehensive Metabolic Panel   2. Gastroesophageal reflux disease without esophagitis  Comprehensive Metabolic Panel   3. History of prostate cancer     4. Screening, anemia, deficiency, iron  CBC Auto Differential             Plan:      Return in about 6 months (around 1/9/2022). High cholesteroldiscussed will not likely resume statin given his 10-year ASCVD risk and he prefers to us avoid statins. Briefly discussed alternatives to statin for treatment. Will check lipid panel per his request  GERDwell-controlled, continue to elevate head at at bedtime, continue as needed use of PPI or stomach acid reducers of choice   History of prostate cancerhas been stable many years, symptoms well controlled with Vesicare  Orders Placed This Encounter   Procedures    Lipid Panel     Standing Status:   Future     Standing Expiration Date:   7/9/2022     Order Specific Question:   Is Patient Fasting?/# of Hours     Answer:   8    CBC Auto Differential     Standing Status:   Future     Standing Expiration Date:   7/9/2022    Comprehensive Metabolic Panel     Standing Status:   Future     Standing Expiration Date:   7/9/2022          Patient given educational materials - see patient instructions. Discussed use, benefit, and side effects of prescribed medications. All patientquestions answered. Pt voiced understanding.  Reviewed health maintenance. Instructedto continue current medications, diet and exercise. Patient agreed with treatmentplan. Follow up as directed.      Electronicallysigned by SHAHZAD Cole CNP on 7/9/2021 at 12:10 PM

## 2021-07-12 ENCOUNTER — HOSPITAL ENCOUNTER (OUTPATIENT)
Age: 81
Setting detail: SPECIMEN
Discharge: HOME | End: 2021-07-12
Payer: MEDICARE

## 2021-07-12 DIAGNOSIS — Z12.5 SCREENING FOR PROSTATE CANCER: Primary | ICD-10-CM

## 2021-07-12 DIAGNOSIS — E78.00 PURE HYPERCHOLESTEROLEMIA: ICD-10-CM

## 2021-07-12 DIAGNOSIS — K21.9 GASTROESOPHAGEAL REFLUX DISEASE WITHOUT ESOPHAGITIS: ICD-10-CM

## 2021-07-12 DIAGNOSIS — Z12.5 SCREENING FOR PROSTATE CANCER: ICD-10-CM

## 2021-07-12 DIAGNOSIS — Z13.0 SCREENING, ANEMIA, DEFICIENCY, IRON: ICD-10-CM

## 2021-07-12 LAB
ABSOLUTE EOS #: 0.23 K/UL (ref 0–0.44)
ABSOLUTE IMMATURE GRANULOCYTE: <0.03 K/UL (ref 0–0.3)
ABSOLUTE LYMPH #: 1.11 K/UL (ref 1.1–3.7)
ABSOLUTE MONO #: 0.42 K/UL (ref 0.1–1.2)
ALBUMIN SERPL-MCNC: 3.9 G/DL (ref 3.5–5.2)
ALBUMIN/GLOBULIN RATIO: 1.3 (ref 1–2.5)
ALP BLD-CCNC: 77 U/L (ref 40–129)
ALT SERPL-CCNC: 12 U/L (ref 5–41)
ANION GAP SERPL CALCULATED.3IONS-SCNC: 12 MMOL/L (ref 9–17)
AST SERPL-CCNC: 16 U/L
BASOPHILS # BLD: 1 % (ref 0–2)
BASOPHILS ABSOLUTE: 0.03 K/UL (ref 0–0.2)
BILIRUB SERPL-MCNC: 0.66 MG/DL (ref 0.3–1.2)
BUN BLDV-MCNC: 14 MG/DL (ref 8–23)
BUN/CREAT BLD: ABNORMAL (ref 9–20)
CALCIUM SERPL-MCNC: 8.7 MG/DL (ref 8.6–10.4)
CHLORIDE BLD-SCNC: 106 MMOL/L (ref 98–107)
CHOLESTEROL/HDL RATIO: 5.1
CHOLESTEROL: 198 MG/DL
CO2: 24 MMOL/L (ref 20–31)
CREAT SERPL-MCNC: 1.06 MG/DL (ref 0.7–1.2)
DIFFERENTIAL TYPE: ABNORMAL
EOSINOPHILS RELATIVE PERCENT: 5 % (ref 1–4)
GFR AFRICAN AMERICAN: >60 ML/MIN
GFR NON-AFRICAN AMERICAN: >60 ML/MIN
GFR SERPL CREATININE-BSD FRML MDRD: ABNORMAL ML/MIN/{1.73_M2}
GFR SERPL CREATININE-BSD FRML MDRD: ABNORMAL ML/MIN/{1.73_M2}
GLUCOSE BLD-MCNC: 100 MG/DL (ref 70–99)
HCT VFR BLD CALC: 43 % (ref 40.7–50.3)
HDLC SERPL-MCNC: 39 MG/DL
HEMOGLOBIN: 14 G/DL (ref 13–17)
IMMATURE GRANULOCYTES: 0 %
LDL CHOLESTEROL: 146 MG/DL (ref 0–130)
LYMPHOCYTES # BLD: 22 % (ref 24–43)
MCH RBC QN AUTO: 30.2 PG (ref 25.2–33.5)
MCHC RBC AUTO-ENTMCNC: 32.6 G/DL (ref 28.4–34.8)
MCV RBC AUTO: 92.9 FL (ref 82.6–102.9)
MONOCYTES # BLD: 8 % (ref 3–12)
NRBC AUTOMATED: 0 PER 100 WBC
PDW BLD-RTO: 13.1 % (ref 11.8–14.4)
PLATELET # BLD: 215 K/UL (ref 138–453)
PLATELET ESTIMATE: ABNORMAL
PMV BLD AUTO: 11.3 FL (ref 8.1–13.5)
POTASSIUM SERPL-SCNC: 4.6 MMOL/L (ref 3.7–5.3)
PROSTATE SPECIFIC ANTIGEN: <0.02 UG/L
RBC # BLD: 4.63 M/UL (ref 4.21–5.77)
RBC # BLD: ABNORMAL 10*6/UL
SEG NEUTROPHILS: 64 % (ref 36–65)
SEGMENTED NEUTROPHILS ABSOLUTE COUNT: 3.17 K/UL (ref 1.5–8.1)
SODIUM BLD-SCNC: 142 MMOL/L (ref 135–144)
TOTAL PROTEIN: 6.8 G/DL (ref 6.4–8.3)
TRIGL SERPL-MCNC: 63 MG/DL
VLDLC SERPL CALC-MCNC: ABNORMAL MG/DL (ref 1–30)
WBC # BLD: 5 K/UL (ref 3.5–11.3)
WBC # BLD: ABNORMAL 10*3/UL

## 2021-08-12 ENCOUNTER — APPOINTMENT (OUTPATIENT)
Dept: CT IMAGING | Age: 81
End: 2021-08-12
Payer: MEDICARE

## 2021-08-12 ENCOUNTER — APPOINTMENT (OUTPATIENT)
Dept: GENERAL RADIOLOGY | Age: 81
End: 2021-08-12
Payer: MEDICARE

## 2021-08-12 ENCOUNTER — HOSPITAL ENCOUNTER (EMERGENCY)
Age: 81
Discharge: ANOTHER HOSPITAL | End: 2021-08-13
Attending: EMERGENCY MEDICINE
Payer: MEDICARE

## 2021-08-12 DIAGNOSIS — G45.9 TIA (TRANSIENT ISCHEMIC ATTACK): Primary | ICD-10-CM

## 2021-08-12 LAB
ABSOLUTE EOS #: 0.2 K/UL (ref 0–0.4)
ABSOLUTE IMMATURE GRANULOCYTE: ABNORMAL K/UL (ref 0–0.3)
ABSOLUTE LYMPH #: 1.1 K/UL (ref 1–4.8)
ABSOLUTE MONO #: 0.4 K/UL (ref 0.1–1.2)
ANION GAP SERPL CALCULATED.3IONS-SCNC: 9 MMOL/L (ref 9–17)
BASOPHILS # BLD: 1 % (ref 0–2)
BASOPHILS ABSOLUTE: 0 K/UL (ref 0–0.2)
BUN BLDV-MCNC: 14 MG/DL (ref 8–23)
BUN/CREAT BLD: NORMAL (ref 9–20)
CALCIUM SERPL-MCNC: 9.5 MG/DL (ref 8.6–10.4)
CHLORIDE BLD-SCNC: 104 MMOL/L (ref 98–107)
CO2: 25 MMOL/L (ref 20–31)
CREAT SERPL-MCNC: 0.99 MG/DL (ref 0.7–1.2)
DIFFERENTIAL TYPE: ABNORMAL
EOSINOPHILS RELATIVE PERCENT: 3 % (ref 1–4)
GFR AFRICAN AMERICAN: >60 ML/MIN
GFR NON-AFRICAN AMERICAN: >60 ML/MIN
GFR SERPL CREATININE-BSD FRML MDRD: NORMAL ML/MIN/{1.73_M2}
GFR SERPL CREATININE-BSD FRML MDRD: NORMAL ML/MIN/{1.73_M2}
GLUCOSE BLD-MCNC: 96 MG/DL (ref 70–99)
HCT VFR BLD CALC: 44.9 % (ref 41–53)
HEMOGLOBIN: 15 G/DL (ref 13.5–17.5)
IMMATURE GRANULOCYTES: ABNORMAL %
LYMPHOCYTES # BLD: 19 % (ref 24–44)
MCH RBC QN AUTO: 30.5 PG (ref 26–34)
MCHC RBC AUTO-ENTMCNC: 33.4 G/DL (ref 31–37)
MCV RBC AUTO: 91.4 FL (ref 80–100)
MONOCYTES # BLD: 8 % (ref 2–11)
NRBC AUTOMATED: ABNORMAL PER 100 WBC
PDW BLD-RTO: 13.6 % (ref 12.5–15.4)
PLATELET # BLD: 210 K/UL (ref 140–450)
PLATELET ESTIMATE: ABNORMAL
PMV BLD AUTO: 9.1 FL (ref 6–12)
POTASSIUM SERPL-SCNC: 4.2 MMOL/L (ref 3.7–5.3)
RBC # BLD: 4.91 M/UL (ref 4.5–5.9)
RBC # BLD: ABNORMAL 10*6/UL
SEG NEUTROPHILS: 69 % (ref 36–66)
SEGMENTED NEUTROPHILS ABSOLUTE COUNT: 3.9 K/UL (ref 1.8–7.7)
SODIUM BLD-SCNC: 138 MMOL/L (ref 135–144)
TROPONIN INTERP: NORMAL
TROPONIN T: NORMAL NG/ML
TROPONIN, HIGH SENSITIVITY: 10 NG/L (ref 0–22)
WBC # BLD: 5.6 K/UL (ref 3.5–11)
WBC # BLD: ABNORMAL 10*3/UL

## 2021-08-12 PROCEDURE — 72125 CT NECK SPINE W/O DYE: CPT

## 2021-08-12 PROCEDURE — 36415 COLL VENOUS BLD VENIPUNCTURE: CPT

## 2021-08-12 PROCEDURE — 2580000003 HC RX 258: Performed by: PHYSICIAN ASSISTANT

## 2021-08-12 PROCEDURE — 70498 CT ANGIOGRAPHY NECK: CPT

## 2021-08-12 PROCEDURE — 99285 EMERGENCY DEPT VISIT HI MDM: CPT

## 2021-08-12 PROCEDURE — 80048 BASIC METABOLIC PNL TOTAL CA: CPT

## 2021-08-12 PROCEDURE — 6370000000 HC RX 637 (ALT 250 FOR IP): Performed by: PHYSICIAN ASSISTANT

## 2021-08-12 PROCEDURE — 84484 ASSAY OF TROPONIN QUANT: CPT

## 2021-08-12 PROCEDURE — 85025 COMPLETE CBC W/AUTO DIFF WBC: CPT

## 2021-08-12 PROCEDURE — 93005 ELECTROCARDIOGRAM TRACING: CPT | Performed by: PHYSICIAN ASSISTANT

## 2021-08-12 PROCEDURE — 71046 X-RAY EXAM CHEST 2 VIEWS: CPT

## 2021-08-12 PROCEDURE — 6360000004 HC RX CONTRAST MEDICATION: Performed by: PHYSICIAN ASSISTANT

## 2021-08-12 PROCEDURE — 70450 CT HEAD/BRAIN W/O DYE: CPT

## 2021-08-12 RX ORDER — 0.9 % SODIUM CHLORIDE 0.9 %
80 INTRAVENOUS SOLUTION INTRAVENOUS ONCE
Status: COMPLETED | OUTPATIENT
Start: 2021-08-12 | End: 2021-08-12

## 2021-08-12 RX ORDER — SODIUM CHLORIDE 0.9 % (FLUSH) 0.9 %
10 SYRINGE (ML) INJECTION PRN
Status: DISCONTINUED | OUTPATIENT
Start: 2021-08-12 | End: 2021-08-13 | Stop reason: HOSPADM

## 2021-08-12 RX ORDER — ASPIRIN 325 MG
325 TABLET ORAL ONCE
Status: COMPLETED | OUTPATIENT
Start: 2021-08-12 | End: 2021-08-12

## 2021-08-12 RX ORDER — CLOPIDOGREL BISULFATE 75 MG/1
300 TABLET ORAL ONCE
Status: COMPLETED | OUTPATIENT
Start: 2021-08-12 | End: 2021-08-12

## 2021-08-12 RX ADMIN — SODIUM CHLORIDE 80 ML: 9 INJECTION, SOLUTION INTRAVENOUS at 17:49

## 2021-08-12 RX ADMIN — IOPAMIDOL 100 ML: 755 INJECTION, SOLUTION INTRAVENOUS at 17:49

## 2021-08-12 RX ADMIN — SODIUM CHLORIDE, PRESERVATIVE FREE 10 ML: 5 INJECTION INTRAVENOUS at 17:49

## 2021-08-12 RX ADMIN — ASPIRIN 325 MG: 325 TABLET ORAL at 18:07

## 2021-08-12 RX ADMIN — CLOPIDOGREL BISULFATE 300 MG: 75 TABLET ORAL at 18:07

## 2021-08-12 ASSESSMENT — PAIN SCALES - GENERAL: PAINLEVEL_OUTOF10: 1

## 2021-08-12 NOTE — ED NOTES
SUPRIYA Singleton is at bedside. Writer placed page to [facility]win neurology consult at this time via perfect serve.       Tremaine Patterson RN  08/12/21 2952

## 2021-08-12 NOTE — ED PROVIDER NOTES
[address] [facility]  [address] [facility] [address] [facility] [address]  Phone: [phone]  Fax: [phone]      Attending Physician [address]       Chief Complaint   Patient presents with    Chest Pain     reports vague left sided chest pain for a few days. also reports pain/numbness in left arm that started this morning. denies previous cardiac issues. DIAGNOSTIC RESULTS     LABS:  Labs Reviewed   CBC WITH AUTO DIFFERENTIAL - Abnormal; Notable for the following components:       Result Value    Seg Neutrophils 69 (*)     Lymphocytes 19 (*)     All other components within normal limits   BASIC METABOLIC PANEL W/ REFLEX TO MG FOR LOW K   TROPONIN       All other labs were within normal range or not returned as of this dictation. RADIOLOGY:  XR CHEST (2 VW)   Final Result   No acute cardiopulmonary process         CT CERVICAL SPINE WO CONTRAST   Final Result   No acute fracture or traumatic malalignment. Overall, no acute abnormality   detected within the cervical spine. CT HEAD WO CONTRAST   Final Result   Remote lacunar infarct, left basal ganglia      no acute intracranial abnormality. CTA HEAD NECK W CONTRAST    (Results Pending)         EMERGENCY DEPARTMENT COURSE:   Vitals:    Vitals:    08/12/21 1535 08/12/21 1618   BP: (!) 141/87 (!) 132/91   Pulse: 70 67   Resp: 16 16   Temp: 97.9 °F (36.6 °C)    TempSrc: Oral    SpO2: 96% 96%   Weight: 106.6 kg (235 lb)    Height: 6' 1\" (1.854 m)      -------------------------  BP: (!) 132/91, Temp: 97.9 °F (36.6 °C), Pulse: 67, Resp: 16      ED Course as of Aug 12 1745   Thu Aug 12, 2021   1725 Spoke with Dr. Siri Karimi regarding the patient and they recommend admission as well. Will give 325 ASA, 300mg Plavix, and obtain CTA head and neck.     [MG]      ED Course User Index  [MG] Rosalina Singleton PA-C         PERTINENT ATTENDING PHYSICIAN COMMENTS:    I performed a history and physical examination of the patient and discussed management with the mid level provider. I reviewed the mid level provider's note and agree with the documented findings and plan of care. Any areas of disagreement are noted on the chart. I was personally present for the key portions of any procedures. I have documented in the chart those procedures where I was not present during the key portions. I have reviewed the emergency nurses triage note. I agree with the chief complaint, past medical history, past surgical history, allergies, medications, social and family history as documented unless otherwise noted below. Documentation of the HPI, Physical Exam and Medical Decision Making performed by mid level providers is based on my personal performance of the HPI, PE and MDM. For Physician Assistant/ Nurse Practitioner cases/documentation I have personally evaluated this patient and have completed at least one if not all key elements of the E/M (history, physical exam, and MDM). Additional findings are as noted. Patient had presented with what he described as an abnormal feeling in his left arm and left leg which was later described as left numbness in the left arm and just abnormal feeling in the left leg. Physical exam revealed no other obvious abnormalities. NIH stroke scale is 1 for some subjective numbness, no TPA indicated. CT of the head showed a remote lacunar infarct. His symptoms did not resolve in the ED and he is on aspirin at home but no statin. No significant or recent stroke work-up. Does need to be admitted for further evaluation and treatment including MRI and neurology eval.  We discussed the case with neurology and they did request CTA head/neck which we will order.       (Please note that portions of this note were completed with a voice recognition program.  Efforts were made to edit the dictations but occasionally words are mis-transcribed.)    Jan Atkinson DO, DO  Attending Emergency Medicine Physician Alana Walls,   08/12/21 1745

## 2021-08-12 NOTE — ED PROVIDER NOTES
[id number] [facility] ED  [address] [facility] [address]  Phone: [phone]  Fax: [phone]        Pt Name: Breana Turcios  MRN: [id number]  [place] 1940  Date of evaluation: 8/12/21    [address] [place] [address]       Chief Complaint   Patient presents with    Chest Pain     reports vague left sided chest pain for a few days. also reports pain/numbness in left arm that started this morning. denies previous cardiac issues. HISTORY OF PRESENT ILLNESS (Location/Symptom, Timing/Onset, Context/Setting, Quality, Duration, Modifying Factors, Severity)      Breana Turcios is a 68 y.o. male former smoker with pertinent PMH of hyperlipidemia, COPD who presents to the ED via private auto with chest pain and left arm numbness. Patient reports for the past 3 to 4 days he has been experiencing some intermittent left-sided chest aches that he points to up in his left shoulder. Denies radiation of the pain. Denies exertional or pleuritic changes. Unsure what exacerbates the pain, notices that sometimes it's there sometimes it's not. Denies cardiac history. States since yesterday he has noticed  intermittent paresthesias in his left arm that since 8 AM this morning has been constant and is currently numb. He also notes that he has noticed his left leg feels \"weird. \"  Denies history of stroke. He does have a history of hyperlipidemia but is not on any statins as apparently he is went off of them about 4 years ago and was on them for 10 years prior because he thought he was experiencing side effects to the medication. Patient does mention that he had a little bit of a headache two days ago and took some aspirin and it went away. Denies any chest pain or shortness of breath. He had a cardiac cath in 2005 that did not require any stenting. Denies use of blood thinners. Denies history of malignancy. Denies personal or family history of aneurysm.  Denies fever, chills, recent illness, neck pain or stiffness, aura, vision changes, photophobia, phonophobia, cough, congestion, facial pain/weakness, nausea, vomiting, diarrhea, abdominal pain, syncope, extremity weakness, numbness, or any other complaints at this time. PAST MEDICAL / SURGICAL / SOCIAL / FAMILY HISTORY     PMH:  has a past medical history of COPD (chronic obstructive pulmonary disease) ([facility] [address] Diverticulosis, Gastritis, GERD (gastroesophageal reflux disease), History of thoracentesis, Hyperlipidemia, Inguinal hernia, Multiple lung nodules, [facility] and Seasonal allergies. Surgical History:  has a past surgical history that includes Hemorrhoid surgery; Lung biopsy (); Colonoscopy; Upper gastrointestinal endoscopy; Endoscopy, colon, diagnostic; cyst removal; other surgical history; Colonoscopy (11/10/2016); Upper gastrointestinal endoscopy (11/10/2016); Cardiac catheterization (); Cataract removal with implant (Bilateral); Inguinal hernia repair (Right, 2020); and hernia repair (Right, 8/3/2020). Social History:  reports that he quit smoking about 36 years ago. He has a 20.00 pack-year smoking history. He has never used smokeless tobacco. He reports current alcohol use. He reports that he does not use drugs. Family History: He indicated that his mother is . He indicated that his father is . family history includes Cancer in his father and mother. Psychiatric History: None    Allergies: Patient has no known allergies. Home Medications:   Prior to Admission medications    Medication Sig Start Date End Date Taking?  Authorizing Provider   vitamin D (CHOLECALCIFEROL) 25 MCG (1000 UT) TABS tablet Take 1,000 Units by mouth daily    Historical Provider, MD   aspirin 81 MG tablet Take 1 tablet by mouth daily 21   Jennifer Alvares,    atorvastatin (LIPITOR) 40 MG tablet Take 1 tablet by mouth nightly 21   Jennifer Alvares,    tamsulosin (FLOMAX) 0.4 MG capsule Take 1 capsule by mouth daily 8/13/21   Arnulfo Lofton DO   clopidogrel (PLAVIX) 75 MG tablet Take 1 tablet by mouth daily 8/14/21   Arnulfo Lofton DO   solifenacin (VESICARE) 10 MG tablet Take 10 mg by mouth daily  5/5/21   Historical Provider, MD   albuterol sulfate HFA (PROAIR HFA) 108 (90 BASE) MCG/ACT inhaler Inhale 2 puffs into the lungs every 6 hours as needed for Wheezing 1/24/17   Vee Matos, APRN - CNP   Glucos-Chond-Hyal Ac-Ca Fructo (MOVE FREE JOINT HEALTH ADVANCE) TABS Take 1 tablet by mouth daily     Historical Provider, MD       REVIEW OF SYSTEMS  (2-9 systems for level 4, 10 ormore for level 5)      Review of Systems    Constitutional: See HPI. Eyes: See HPI. HENT: See HPI. Respiratory: Denies shortness of breath. Cardiovascular:  See HPI. GI: See HPI. Musculoskeletal: Denies recent trauma. Skin: Denies new rashes or wounds. Neurologic:  See HPI. Heme: Denies bleeding disorders. All other systems negative except as marked. PHYSICAL EXAM  (up to 7 for level 4, 8 or more for level 5)      INITIAL VITALS:  height is 6' 1\" (1.854 m) and weight is 106.6 kg (235 lb). His temperature is 98.2 °F (36.8 °C). His blood pressure is 155/69 (abnormal) and his pulse is 67. His respiration is 16 and oxygen saturation is 95%. Vital signs reviewed. Physical Exam    General:  Alert, cooperative, well-groomed, well-nourished, appears stated age, and is in no acute distress. Head:  Normocephalic, atraumatic, and without obvious abnormality. Eyes:  Sclerae/conjunctivae clear without injection, pallor, or icterus. Corneas clear without opacities. PERRL EOM's intact. No nystagmus. ENT: Ears and nose are all without obvious masses lesion or deformity. Lips and buccal mucosa are pink and moist without lesions. Good dentition. Gingivae is pink and without lesions. Tongue and uvula midline. Symmetric elevation of soft palate upon phonation. No hoarseness or muffled voice. Oropharynx is clear, without erythema. Tonsils are symmetrical, without enlargement or erythema, bilaterally. No exudates or drainage. Neck: Supple and symmetrical. Trachea midline. No adenopathy. No jugular venous distention. Lungs:   No respiratory distress. Clear to auscultation bilaterally. No wheezes, rhonchi, or rales. No tenderness palpation of the wall. Heart:  Regular rate. Regular rhythm. No murmurs, rubs, or gallops. Abdomen:   Normoactive bowel sounds. Soft, nontender, nondistended without guarding or rebound. No palpable masses. No CVA tenderness. Extremities: Warm and dry without erythema or edema. Skin: Soft, good turgor, and well-hydrated. No obvious rashes or lesions. Neurologic: GCS is 15 and no focal deficits are appreciated. Normal and symmetric muscle bulk and tone throughout. Strength is 5/5 throughout. Sensation intact to light touch throughout though is subjectively decreased on the left. Gait with normal base. Good speed, accuracy and rhythm of NUNO, FTN, and HTS. No pronator drift. Normal Romberg test. DTRs 2+. CN II-XII grossly intact. Psychiatric: Normal mood and affect. Normal behavior. Coherent thought process. DIFFERENTIAL DIAGNOSIS / MDM     The patient presents to the Emergency Department with left shoulder pain now resolved but persistent and diffuse left upper extremity numbness/paresthesias and left lower extremity paresthesias. Vital signs are unremarkable. Physical exam demonstrates well-appearing nontoxic male in no acute distress. Full neuro exam obtained and he is neurologically intact. There is no weakness or pronator drift. NIH is 0. In addition to cardiac work-up, will obtain CT head and neck. Symptoms have been ongoing since yesterday in regards to the numbness tingling and chest pain has been ongoing for 3 days. This patient was seen by the attending physician and they agreed with the assessment and plan. NIH Stroke Scale    Time Performed:  16:00 PM     1a.   Level of consciousness:  0 - alert; keenly responsive  1b. Level of consciousness questions:  0 - answers both questions correctly  1c. Level of consciousness questions:  0 - performs both tasks correctly  2. Best Gaze:  0 - normal  3. Visual:  0 - no visual loss  4. Facial Palsy:  0 - normal symmetric movement  5a. Motor left arm:  0 - no drift, limb holds 90 (or 45) degrees for full 10 seconds  5b. Motor right arm:  0 - no drift, limb holds 90 (or 45) degrees for full 10 seconds  6a. Motor left le - no drift; leg holds 30 degree position for full 5 seconds  6b. Motor right le - no drift; leg holds 30 degree position for full 5 seconds  7. Limb Ataxia:  0 - absent  8. Sensory:  0 - normal; no sensory loss  9. Best Language:  0 - no aphasia, normal  10. Dysarthria:  0 - normal  11.   Extinction and Inattention:  0 - no abnormality    TOTAL:  0    PLAN (LABS / IMAGING / EKG):  Orders Placed This Encounter   Procedures    CT HEAD WO CONTRAST    CT CERVICAL SPINE WO CONTRAST    XR CHEST (2 VW)    CTA HEAD NECK W CONTRAST    CBC Auto Differential    Basic Metabolic Panel w/ Reflex to MG    Troponin    EKG 12 Lead       MEDICATIONS ORDERED:  Orders Placed This Encounter   Medications    aspirin tablet 325 mg    clopidogrel (PLAVIX) tablet 300 mg    0.9 % sodium chloride bolus    DISCONTD: sodium chloride flush 0.9 % injection 10 mL    iopamidol (ISOVUE-370) 76 % injection 100 mL       Controlled Substances Monitoring:     DIAGNOSTIC RESULTS     EKG: All EKG's are interpreted by the Emergency Department Physician who either signs or Co-signs this chart in the absenceof a cardiologist.    EKG INTERPRETATION:    Interpreted by attending emergency department physician    Rhythm: normal sinus   Rate: 65  Axis: normal  Ectopy: none  Conduction: normal; QTc - 405  ST Segments: no acute change  T Waves: no acute change  Q Waves: none    Clinical Impression: Sinus EKG with no acute ischemia/infarction noted. No significant change from previous on 7/23/202020 besides rate. RADIOLOGY: All images are read by the radiologist and their interpretations are reviewed. XR CHEST (2 VW)    Result Date: 8/12/2021  EXAMINATION: TWO XRAY VIEWS OF THE CHEST 8/12/2021 4:39 pm COMPARISON: December 28, 2017 HISTORY: ORDERING SYSTEM PROVIDED HISTORY: Left shoulder pain (resolved) left arm paresthesia TECHNOLOGIST PROVIDED HISTORY: Left shoulder pain (resolved) left arm paresthesia Reason for Exam: Left shoulder pain (resolved) left arm paresthesia Acuity: Acute Type of Exam: Initial FINDINGS: Stable cardiomediastinal silhouette. No pulmonary venous congestion or edema. Emphysematous changes of the lungs are redemonstrated. Chronic left lower lobe atelectasis/scarring redemonstrated. No new focal lung abnormality. No pleural effusion or pneumothorax. No acute cardiopulmonary process     CT HEAD WO CONTRAST    Result Date: 8/12/2021  EXAMINATION: CT OF THE HEAD WITHOUT CONTRAST  8/12/2021 4:21 pm TECHNIQUE: CT of the head was performed without the administration of intravenous contrast. Dose modulation, iterative reconstruction, and/or weight based adjustment of the mA/kV was utilized to reduce the radiation dose to as low as reasonably achievable. COMPARISON: None. HISTORY: ORDERING SYSTEM PROVIDED HISTORY: left upper extremity tingling and left lower extremity paresthesias TECHNOLOGIST PROVIDED HISTORY: left upper extremity tingling and left lower extremity paresthesias Decision Support Exception - unselect if not a suspected or confirmed emergency medical condition->Emergency Medical Condition (MA) Reason for Exam: chest pain Acuity: Acute Type of Exam: Initial Additional signs and symptoms: LUE numbness FINDINGS: BRAIN/VENTRICLES: There is no acute intracranial hemorrhage, mass effect or midline shift. No abnormal extra-axial fluid collection.   The gray-white differentiation is maintained without evidence of an acute infarct. There is no evidence of hydrocephalus. Remote lacunar infarct left basal ganglia ORBITS: The visualized portion of the orbits demonstrate no acute abnormality. SINUSES: The visualized paranasal sinuses and mastoid air cells demonstrate no acute abnormality. SOFT TISSUES/SKULL:  No acute abnormality of the visualized skull or soft tissues. Remote lacunar infarct, left basal ganglia no acute intracranial abnormality. CT CERVICAL SPINE WO CONTRAST    Result Date: 8/12/2021  EXAMINATION: CT OF THE CERVICAL SPINE WITHOUT CONTRAST 8/12/2021 1:21 pm TECHNIQUE: CT of the cervical spine was performed without the administration of intravenous contrast. Multiplanar reformatted images are provided for review. Dose modulation, iterative reconstruction, and/or weight based adjustment of the mA/kV was utilized to reduce the radiation dose to as low as reasonably achievable. COMPARISON: None. HISTORY: ORDERING SYSTEM PROVIDED HISTORY: left upper extremity tingling TECHNOLOGIST PROVIDED HISTORY: left upper extremity tingling Decision Support Exception - unselect if not a suspected or confirmed emergency medical condition->Emergency Medical Condition (MA) Reason for Exam: neck pain Acuity: Acute Type of Exam: Initial Additional signs and symptoms: chest pain Relevant Medical/Surgical History: hx Prostate Ca FINDINGS: BONES/ALIGNMENT: No acute fracture or traumatic malalignment. DEGENERATIVE CHANGES: Multilevel degenerative disc and facet disease noted. No high-grade central canal stenosis is found. SOFT TISSUES: Prevertebral soft tissues are unremarkable for the patient's age. Visualized lungs are clear. No acute fracture or traumatic malalignment. Overall, no acute abnormality detected within the cervical spine.      CTA HEAD NECK W CONTRAST    Result Date: 8/12/2021  EXAMINATION: CTA OF THE HEAD AND NECK WITH CONTRAST 8/12/2021 5:35 pm: TECHNIQUE: CTA of the head and neck was performed with the administration of intravenous contrast. Multiplanar reformatted images are provided for review. MIP images are provided for review. Stenosis of the internal carotid arteries measured using NASCET criteria. Dose modulation, iterative reconstruction, and/or weight based adjustment of the mA/kV was utilized to reduce the radiation dose to as low as reasonably achievable. COMPARISON: None. HISTORY: ORDERING SYSTEM PROVIDED HISTORY: remote infarct - current Left sided extremity paresthesias TECHNOLOGIST PROVIDED HISTORY: remote infarct - current Left sided extremity paresthesias Decision Support Exception - unselect if not a suspected or confirmed emergency medical condition->Emergency Medical Condition (MA) Reason for Exam: remote infarct - current Left sided extremity paresthesias Acuity: Acute Type of Exam: Initial Relevant Medical/Surgical History: HX: Diverticulosis and prostate cancer SX: hernia repair FINDINGS: CTA NECK: AORTIC ARCH/ARCH VESSELS: No dissection or arterial injury. No significant stenosis of the brachiocephalic or subclavian arteries. CAROTID ARTERIES: No dissection, arterial injury, or hemodynamically significant stenosis by NASCET criteria. VERTEBRAL ARTERIES: No dissection, arterial injury, or significant stenosis. SOFT TISSUES: The lung apices are clear. No cervical or superior mediastinal lymphadenopathy. The larynx and pharynx are unremarkable. No acute abnormality of the salivary and thyroid glands. BONES: No acute osseous abnormality. 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 No significant stenosis of the intracranial internal carotid, anterior cerebral, or left middle cerebral arteries. No aneurysm. POSTERIOR CIRCULATION: No significant stenosis of the vertebral, basilar, or posterior cerebral arteries within limits of artifact from the patient's dentition.   Mild calcification of the mid intracranial right vertebral artery. No aneurysm. OTHER: No dural venous sinus thrombosis on this non-dedicated study. BRAIN: No mass effect or midline shift. No extra-axial fluid collection. The gray-white differentiation is maintained. 1. No large vessel occlusion of the intracranial arteries. 2. 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 Otherwise, no hemodynamically significant arterial stenosis in the head or neck.      LABS:  Results for orders placed or performed during the hospital encounter of 08/12/21   CBC Auto Differential   Result Value Ref Range    WBC 5.6 3.5 - 11.0 k/uL    RBC 4.91 4.5 - 5.9 m/uL    Hemoglobin 15.0 13.5 - 17.5 g/dL    Hematocrit 44.9 41 - 53 %    MCV 91.4 80 - 100 fL    MCH 30.5 26 - 34 pg    MCHC 33.4 31 - 37 g/dL    RDW 13.6 12.5 - 15.4 %    Platelets 473 864 - 560 k/uL    MPV 9.1 6.0 - 12.0 fL    NRBC Automated NOT REPORTED per 100 WBC    Differential Type NOT REPORTED     Seg Neutrophils 69 (H) 36 - 66 %    Lymphocytes 19 (L) 24 - 44 %    Monocytes 8 2 - 11 %    Eosinophils % 3 1 - 4 %    Basophils 1 0 - 2 %    Immature Granulocytes NOT REPORTED 0 %    Segs Absolute 3.90 1.8 - 7.7 k/uL    Absolute Lymph # 1.10 1.0 - 4.8 k/uL    Absolute Mono # 0.40 0.1 - 1.2 k/uL    Absolute Eos # 0.20 0.0 - 0.4 k/uL    Basophils Absolute 0.00 0.0 - 0.2 k/uL    Absolute Immature Granulocyte NOT REPORTED 0.00 - 0.30 k/uL    WBC Morphology NOT REPORTED     RBC Morphology NOT REPORTED     Platelet Estimate NOT REPORTED    Basic Metabolic Panel w/ Reflex to MG   Result Value Ref Range    Glucose 96 70 - 99 mg/dL    BUN 14 8 - 23 mg/dL    CREATININE 0.99 0.70 - 1.20 mg/dL    Bun/Cre Ratio NOT REPORTED 9 - 20    Calcium 9.5 8.6 - 10.4 mg/dL    Sodium 138 135 - 144 mmol/L    Potassium 4.2 3.7 - 5.3 mmol/L    Chloride 104 98 - 107 mmol/L    CO2 25 20 - 31 mmol/L    Anion Gap 9 9 - 17 mmol/L    GFR Non-African American >60 >60 mL/min    GFR African American >60 >60 mL/min    GFR Comment GFR Staging NOT REPORTED    Troponin   Result Value Ref Range    Troponin, High Sensitivity 10 0 - 22 ng/L    Troponin T NOT REPORTED <0.03 ng/mL    Troponin Interp NOT REPORTED    EKG 12 Lead   Result Value Ref Range    Ventricular Rate 65 BPM    Atrial Rate 65 BPM    P-R Interval 168 ms    QRS Duration 86 ms    Q-T Interval 390 ms    QTc Calculation (Bazett) 405 ms    P Axis 37 degrees    R Axis 29 degrees    T Axis 29 degrees       EMERGENCY DEPARTMENT COURSE     ED Course as of Aug 14 0500   Thu Aug 12, 2021   1725 Spoke with Dr. Brinton Landau regarding the patient and they recommend admission as well. Will give 325 ASA, 300mg Plavix, and obtain CTA head and neck. [MG]   1805 Hospitalist was consulted, however was informed by nursing manager that they are capped at this point upstairs. They are however working on obtaining additional staff. Will consider transfer pending any updates. [MG]   7231 CTA scan demonstrates moderate stenosis of the M1 segment right middle cerebral artery, but otherwise no acute abnormalities. No large vessel occlusion. [MG]   1920 Elected to contact neuro again as we do not have any beds at [facility]. He said the patient could go to [facility] or [facility], but he is rounding at [employer] [employer]. We will attempt to transfer the patient to [facility]. [MG]   2044 Spoke with Agustin Morales NP at [facility] and they will accept the admission. [MG]      ED Course User Index  [MG] Rosalina Singleton PA-C        Vitals:    Vitals:    08/12/21 1930 08/12/21 2115 08/12/21 2130 08/13/21 0304   BP: 123/72 117/73 (!) 117/104 (!) 155/69   Pulse: 74 62 64 67   Resp:    16   Temp:    98.2 °F (36.8 °C)   TempSrc:       SpO2: 98% 96% 98% 95%   Weight:       Height:         -------------------------  BP: (!) 155/69, Temp: 98.2 °F (36.8 °C), Pulse: 67, Resp: 16      RE-EVALUATION:  See ED Course notes above.     10:00 PM EDT - Attending to complete all remaining care, diagnosis and disposition for this patient. We discussed this patient prior to my departure. My note will be refreshed to reflect these details, though I was not actively involved in this patient's care following the time stamp. CONSULTS:  Neuro  Hospitalist    PROCEDURES:  None    FINAL IMPRESSION      1. TIA (transient ischemic attack)          DISPOSITION / PLAN     CONDITION ON DISPOSITION:   Good / Stable for admission    PATIENT REFERRED TO:  No follow-up provider specified.     DISCHARGE MEDICATIONS:  Discharge Medication List as of 8/13/2021  3:26 AM          Jonny Calderon PA-C   Emergency Medicine Physician Assistant    (Please note that portions of this note were completed with a voice recognition program.  Efforts were made to edit the dictations but occasionally words aremis-transcribed.)      Jonny Calderon PA-C  08/12/21 600 Theresa Bello PA-C  08/12/21 7412       Rosalina Singleton PA-C  08/14/21 [id number]

## 2021-08-12 NOTE — ED NOTES
Writer placed page to [email or web address] hospitalist at this time via perfect serve.       Alicia Paige RN  08/12/21 7370

## 2021-08-13 ENCOUNTER — APPOINTMENT (OUTPATIENT)
Dept: MRI IMAGING | Age: 81
DRG: 068 | End: 2021-08-13
Attending: STUDENT IN AN ORGANIZED HEALTH CARE EDUCATION/TRAINING PROGRAM
Payer: MEDICARE

## 2021-08-13 ENCOUNTER — HOSPITAL ENCOUNTER (INPATIENT)
Age: 81
LOS: 1 days | Discharge: HOME | DRG: 068 | End: 2021-08-13
Attending: STUDENT IN AN ORGANIZED HEALTH CARE EDUCATION/TRAINING PROGRAM | Admitting: INTERNAL MEDICINE
Payer: MEDICARE

## 2021-08-13 VITALS
DIASTOLIC BLOOD PRESSURE: 82 MMHG | HEART RATE: 73 BPM | WEIGHT: 233.47 LBS | OXYGEN SATURATION: 98 % | TEMPERATURE: 97.7 F | RESPIRATION RATE: 16 BRPM | HEIGHT: 73 IN | SYSTOLIC BLOOD PRESSURE: 119 MMHG | BODY MASS INDEX: 30.94 KG/M2

## 2021-08-13 VITALS
BODY MASS INDEX: 31.14 KG/M2 | TEMPERATURE: 98.2 F | RESPIRATION RATE: 16 BRPM | SYSTOLIC BLOOD PRESSURE: 155 MMHG | HEART RATE: 67 BPM | WEIGHT: 235 LBS | OXYGEN SATURATION: 95 % | DIASTOLIC BLOOD PRESSURE: 69 MMHG | HEIGHT: 73 IN

## 2021-08-13 LAB
CHOLESTEROL/HDL RATIO: 5.8
CHOLESTEROL: 202 MG/DL
EKG ATRIAL RATE: 65 BPM
EKG P AXIS: 37 DEGREES
EKG P-R INTERVAL: 168 MS
EKG Q-T INTERVAL: 390 MS
EKG QRS DURATION: 86 MS
EKG QTC CALCULATION (BAZETT): 405 MS
EKG R AXIS: 29 DEGREES
EKG T AXIS: 29 DEGREES
EKG VENTRICULAR RATE: 65 BPM
HDLC SERPL-MCNC: 35 MG/DL
LDL CHOLESTEROL: 149 MG/DL (ref 0–130)
LV EF: 65 %
LVEF MODALITY: NORMAL
TRIGL SERPL-MCNC: 91 MG/DL
TROPONIN INTERP: NORMAL
TROPONIN INTERP: NORMAL
TROPONIN T: NORMAL NG/ML
TROPONIN T: NORMAL NG/ML
TROPONIN, HIGH SENSITIVITY: 10 NG/L (ref 0–22)
TROPONIN, HIGH SENSITIVITY: 11 NG/L (ref 0–22)
VLDLC SERPL CALC-MCNC: ABNORMAL MG/DL (ref 1–30)

## 2021-08-13 PROCEDURE — 97535 SELF CARE MNGMENT TRAINING: CPT

## 2021-08-13 PROCEDURE — 2060000000 HC ICU INTERMEDIATE R&B

## 2021-08-13 PROCEDURE — 97165 OT EVAL LOW COMPLEX 30 MIN: CPT

## 2021-08-13 PROCEDURE — 80061 LIPID PANEL: CPT

## 2021-08-13 PROCEDURE — 99232 SBSQ HOSP IP/OBS MODERATE 35: CPT | Performed by: INTERNAL MEDICINE

## 2021-08-13 PROCEDURE — 97530 THERAPEUTIC ACTIVITIES: CPT

## 2021-08-13 PROCEDURE — 6360000002 HC RX W HCPCS: Performed by: INTERNAL MEDICINE

## 2021-08-13 PROCEDURE — 97116 GAIT TRAINING THERAPY: CPT

## 2021-08-13 PROCEDURE — 36415 COLL VENOUS BLD VENIPUNCTURE: CPT

## 2021-08-13 PROCEDURE — 97162 PT EVAL MOD COMPLEX 30 MIN: CPT

## 2021-08-13 PROCEDURE — 2580000003 HC RX 258: Performed by: NURSE PRACTITIONER

## 2021-08-13 PROCEDURE — 93005 ELECTROCARDIOGRAM TRACING: CPT | Performed by: NURSE PRACTITIONER

## 2021-08-13 PROCEDURE — 99222 1ST HOSP IP/OBS MODERATE 55: CPT | Performed by: PSYCHIATRY & NEUROLOGY

## 2021-08-13 PROCEDURE — 70551 MRI BRAIN STEM W/O DYE: CPT

## 2021-08-13 PROCEDURE — 6360000002 HC RX W HCPCS: Performed by: NURSE PRACTITIONER

## 2021-08-13 PROCEDURE — 6370000000 HC RX 637 (ALT 250 FOR IP): Performed by: NURSE PRACTITIONER

## 2021-08-13 PROCEDURE — APPSS180 APP SPLIT SHARED TIME > 60 MINUTES: Performed by: NURSE PRACTITIONER

## 2021-08-13 PROCEDURE — 84484 ASSAY OF TROPONIN QUANT: CPT

## 2021-08-13 PROCEDURE — 92523 SPEECH SOUND LANG COMPREHEN: CPT

## 2021-08-13 PROCEDURE — 93306 TTE W/DOPPLER COMPLETE: CPT

## 2021-08-13 RX ORDER — SODIUM CHLORIDE 9 MG/ML
25 INJECTION, SOLUTION INTRAVENOUS PRN
Status: DISCONTINUED | OUTPATIENT
Start: 2021-08-13 | End: 2021-08-13 | Stop reason: HOSPADM

## 2021-08-13 RX ORDER — ATORVASTATIN CALCIUM 40 MG/1
40 TABLET, FILM COATED ORAL NIGHTLY
Qty: 30 TABLET | Refills: 3 | Status: SHIPPED | OUTPATIENT
Start: 2021-08-13 | End: 2021-12-16 | Stop reason: SDUPTHER

## 2021-08-13 RX ORDER — ONDANSETRON 2 MG/ML
4 INJECTION INTRAMUSCULAR; INTRAVENOUS EVERY 6 HOURS PRN
Status: DISCONTINUED | OUTPATIENT
Start: 2021-08-13 | End: 2021-08-13 | Stop reason: HOSPADM

## 2021-08-13 RX ORDER — SODIUM CHLORIDE 0.9 % (FLUSH) 0.9 %
10 SYRINGE (ML) INJECTION EVERY 12 HOURS SCHEDULED
Status: DISCONTINUED | OUTPATIENT
Start: 2021-08-13 | End: 2021-08-13 | Stop reason: HOSPADM

## 2021-08-13 RX ORDER — TAMSULOSIN HYDROCHLORIDE 0.4 MG/1
0.4 CAPSULE ORAL DAILY
Status: DISCONTINUED | OUTPATIENT
Start: 2021-08-13 | End: 2021-08-13 | Stop reason: HOSPADM

## 2021-08-13 RX ORDER — TAMSULOSIN HYDROCHLORIDE 0.4 MG/1
0.4 CAPSULE ORAL DAILY
Status: ON HOLD | COMMUNITY
End: 2021-08-13 | Stop reason: ALTCHOICE

## 2021-08-13 RX ORDER — ALBUTEROL SULFATE 90 UG/1
2 AEROSOL, METERED RESPIRATORY (INHALATION) EVERY 6 HOURS PRN
Status: DISCONTINUED | OUTPATIENT
Start: 2021-08-13 | End: 2021-08-13 | Stop reason: HOSPADM

## 2021-08-13 RX ORDER — ASPIRIN 81 MG/1
81 TABLET, CHEWABLE ORAL DAILY
Status: DISCONTINUED | OUTPATIENT
Start: 2021-08-13 | End: 2021-08-13 | Stop reason: HOSPADM

## 2021-08-13 RX ORDER — ATORVASTATIN CALCIUM 40 MG/1
40 TABLET, FILM COATED ORAL NIGHTLY
Status: DISCONTINUED | OUTPATIENT
Start: 2021-08-13 | End: 2021-08-13 | Stop reason: HOSPADM

## 2021-08-13 RX ORDER — CLOPIDOGREL BISULFATE 75 MG/1
75 TABLET ORAL DAILY
Status: DISCONTINUED | OUTPATIENT
Start: 2021-08-13 | End: 2021-08-13 | Stop reason: HOSPADM

## 2021-08-13 RX ORDER — TROSPIUM CHLORIDE 20 MG/1
20 TABLET, FILM COATED ORAL
Status: DISCONTINUED | OUTPATIENT
Start: 2021-08-13 | End: 2021-08-13 | Stop reason: HOSPADM

## 2021-08-13 RX ORDER — TAMSULOSIN HYDROCHLORIDE 0.4 MG/1
0.4 CAPSULE ORAL DAILY
Qty: 30 CAPSULE | Refills: 3 | Status: SHIPPED
Start: 2021-08-13 | End: 2021-08-17 | Stop reason: ALTCHOICE

## 2021-08-13 RX ORDER — FLUTICASONE PROPIONATE 50 MCG
1 SPRAY, SUSPENSION (ML) NASAL DAILY PRN
Status: DISCONTINUED | OUTPATIENT
Start: 2021-08-13 | End: 2021-08-13 | Stop reason: HOSPADM

## 2021-08-13 RX ORDER — PANTOPRAZOLE SODIUM 40 MG/1
40 TABLET, DELAYED RELEASE ORAL
Status: DISCONTINUED | OUTPATIENT
Start: 2021-08-13 | End: 2021-08-13 | Stop reason: HOSPADM

## 2021-08-13 RX ORDER — CLOPIDOGREL BISULFATE 75 MG/1
75 TABLET ORAL DAILY
Qty: 30 TABLET | Refills: 3 | Status: SHIPPED | OUTPATIENT
Start: 2021-08-14 | End: 2021-12-16 | Stop reason: SDUPTHER

## 2021-08-13 RX ORDER — LORAZEPAM 2 MG/ML
0.5 INJECTION INTRAMUSCULAR ONCE
Status: COMPLETED | OUTPATIENT
Start: 2021-08-13 | End: 2021-08-13

## 2021-08-13 RX ORDER — ASPIRIN 81 MG/1
81 TABLET, CHEWABLE ORAL DAILY
Status: DISCONTINUED | OUTPATIENT
Start: 2021-08-13 | End: 2021-08-13

## 2021-08-13 RX ORDER — SODIUM CHLORIDE 0.9 % (FLUSH) 0.9 %
10 SYRINGE (ML) INJECTION PRN
Status: DISCONTINUED | OUTPATIENT
Start: 2021-08-13 | End: 2021-08-13 | Stop reason: HOSPADM

## 2021-08-13 RX ORDER — ONDANSETRON 4 MG/1
4 TABLET, ORALLY DISINTEGRATING ORAL EVERY 8 HOURS PRN
Status: DISCONTINUED | OUTPATIENT
Start: 2021-08-13 | End: 2021-08-13 | Stop reason: HOSPADM

## 2021-08-13 RX ADMIN — SODIUM CHLORIDE, PRESERVATIVE FREE 10 ML: 5 INJECTION INTRAVENOUS at 08:13

## 2021-08-13 RX ADMIN — ASPIRIN 81 MG: 81 TABLET, CHEWABLE ORAL at 08:12

## 2021-08-13 RX ADMIN — TAMSULOSIN HYDROCHLORIDE 0.4 MG: 0.4 CAPSULE ORAL at 08:12

## 2021-08-13 RX ADMIN — ENOXAPARIN SODIUM 30 MG: 30 INJECTION SUBCUTANEOUS at 08:12

## 2021-08-13 RX ADMIN — CLOPIDOGREL BISULFATE 75 MG: 75 TABLET ORAL at 08:12

## 2021-08-13 RX ADMIN — LORAZEPAM 0.5 MG: 2 INJECTION INTRAMUSCULAR; INTRAVENOUS at 08:12

## 2021-08-13 ASSESSMENT — ENCOUNTER SYMPTOMS
ABDOMINAL PAIN: 0
VOMITING: 0
CONSTIPATION: 0
NAUSEA: 0
BLOOD IN STOOL: 0
COLOR CHANGE: 0
COUGH: 0
WHEEZING: 0
DIARRHEA: 0
SHORTNESS OF BREATH: 0

## 2021-08-13 ASSESSMENT — PAIN SCALES - GENERAL
PAINLEVEL_OUTOF10: 0

## 2021-08-13 NOTE — ACP (ADVANCE CARE PLANNING)
Advance Care Planning     Advance Care Planning Activator (Inpatient)  Conversation Note      Date of ACP Conversation: 8/13/2021     Conversation Conducted with: Patient with Decision Making Capacity    ACP Activator: Sydni Schmitt RN        Health Care Decision Maker:     Current Designated Health Care Decision Maker:     Click here to complete Healthcare Decision Makers including section of the Healthcare Decision Maker Relationship (ie \"Primary\")  Today we documented Decision Maker(s) consistent with Legal Next of Kin hierarchy. Care Preferences    Ventilation: \"If you were in your present state of health and suddenly became very ill and were unable to breathe on your own, what would your preference be about the use of a ventilator (breathing machine) if it were available to you? \"      Would the patient desire the use of ventilator (breathing machine)?: no    \"If your health worsens and it becomes clear that your chance of recovery is unlikely, what would your preference be about the use of a ventilator (breathing machine) if it were available to you? \"     Would the patient desire the use of ventilator (breathing machine)?: No      Resuscitation  \"CPR works best to restart the heart when there is a sudden event, like a heart attack, in someone who is otherwise healthy. Unfortunately, CPR does not typically restart the heart for people who have serious health conditions or who are very sick. \"    \"In the event your heart stopped as a result of an underlying serious health condition, would you want attempts to be made to restart your heart (answer \"yes\" for attempt to resuscitate) or would you prefer a natural death (answer \"no\" for do not attempt to resuscitate)? \" no       [x] Yes   [] No   Educated Patient / Estel Geneva regarding differences between Advance Directives and portable DNR orders.     Length of ACP Conversation in minutes:      Conversation Outcomes:  [x] ACP discussion completed  [] Existing advance directive reviewed with patient; no changes to patient's previously recorded wishes  [] New Advance Directive completed  [] Portable Do Not Rescitate prepared for Provider review and signature  [] POLST/POST/MOLST/MOST prepared for Provider review and signature      Follow-up plan:    [] Schedule follow-up conversation to continue planning  [] Referred individual to Provider for additional questions/concerns   [] Advised patient/agent/surrogate to review completed ACP document and update if needed with changes in condition, patient preferences or care setting    [x] This note routed to one or more involved healthcare providers        His son is POA and there is copy in media tab . . [facility]

## 2021-08-13 NOTE — PROGRESS NOTES
.All patient belongings collected. IV and telemetry removed. Discharge paperwork given and explained to patient. All questions answered. Patient being discharged home with no home care services. Patient awaiting ride from son who lives 1 hour away.

## 2021-08-13 NOTE — ED NOTES
Patient requested to get up to the chair. Alert denies distress.       Rafael Jacobson, BRYAN  08/13/21 6865

## 2021-08-13 NOTE — H&P
[facility]  Office: [address], DO, Jelenawendysamantha Haider, DO, Essie Duane, DO, Vinh Randy Faustin, DO, Elvia Garcia MD, Monty Rushing MD, Elijah Steven MD, Freeman Ellis MD, Ashly Sanchez MD, Jose Onofre MD, Obdulia Hughes MD, Sandie Jarvis DO, Jesus Rosenberg MD, Zuhair Correa DO, Maribel Walker MD,  Nena Flynn DO, Mingo Serrano MD, Bennett Muñoz MD, Sara Wagner MD, Rodrick Bolaños MD, Yung Valerio MD, Agnieszka Patino MD, Cally Davidson MD, Ashley Haji, [facility], [facility], CNP, Varun Yoder, CNP, Tanda Lesches, CNS, Gerda Gallardo, CNP, Jaja Sullivan, CNP, Christal Beaulieu, CNP, Melania Smart, CNP, Delbert Aase, CNP, Debby Perez PA-C, Nimisha Allan, [facility], Karla Lopez, CNP, Larry Caal, CNP, Alisson Watt, CNP, Linda Chow, CNP, Chidi Nj, CNP, Destin Ziegler, [facility], Hassan Hampton, [facility], Cammie Mack, [facility]   [facility]    HISTORY AND PHYSICAL EXAMINATION            Date:   8/13/2021  Patient name:  Padmini May  Date of admission:  8/13/2021  3:41 AM  MRN:   [id number]  Account:  [de-identified]  YOB: 1940  PCP:    SHAHZAD Glasgow CNP  Room:   [address] [place] [address]  Code Status:    Full Code    Chief Complaint:     Numbness and tingling to left arm and leg. History Obtained From:     Patient and electronic medical record. History of Present Illness:     Padmini May is a 68 y.o. Non- / non  male who presents with No chief complaint on file. and is admitted to the hospital for the management of TIA (transient ischemic attack). The patient reports to the hospital with complaint of numbness and tingling in his left arm and leg. He states that 2 days ago he started experiencing a discomfort in his left chest that, he states it was mild, intermittent and dull.  He reports that he woke up on 8/12/2021 around 9:30am and experienced numbness and tingling in his left hand, arm and leg. He has difficulty describing the sensation. He denies any loss of strength to his left side. No blurred vision, slurred speech or facial droop. He states that his symptoms resolved after he reported to the emergency department that afternoon. He is currently symptom free. He denies fever, chills, nausea, vomiting or shortness of breath. No additional symptomology or further modifying factors. He has past medical history that includes dyslipidemia and GERD. The patient originally presented to [address] [place] [address] ER. CT head, CTA head and neck were obtained as well as neurology consultation. The patient was given a loading dose of 300mg plavix and 325mg aspirin. High sensitivity troponin 10, 10. EKG interpretation per ER physician:   \"Rhythm: normal sinus   Rate: 65  Axis: normal  Ectopy: none  Conduction: normal; QTc - 405  ST Segments: no acute change  T Waves: no acute change  Q Waves: none     Clinical Impression: Sinus EKG with no acute ischemia/infarction noted. No significant change from previous on 7/23/202020 besides rate. \"    CXR shows no acute cardiopulmonary process. CT head without contrast shows: Remote lacunar infarct, left basal ganglia. no acute intracranial abnormality. CT cervical spine: No acute fracture or traumatic malalignment. Overall, no acute abnormality detected within the cervical spine. CTA head and neck with contrast shows: 1. No large vessel occlusion of the intracranial arteries. 2. 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 Otherwise, no hemodynamically significant arterial stenosis in the head or neck.      Past Medical History:     Past Medical History:   Diagnosis Date    COPD (chronic obstructive pulmonary disease) ([address]     Diverticulosis     Gastritis     GERD (gastroesophageal reflux disease)     History of thoracentesis 2005    left    Hyperlipidemia     Inguinal hernia 08/03/2020    right    Multiple lung nodules 2005    left    Pro[address] cancer [address] [address]     Seasonal allergies         Past Surgical History:     Past Surgical History:   Procedure Laterality Date    CARDIAC CATHETERIZATION  2005    no stents reported    CATARACT REMOVAL WITH IMPLANT Bilateral     COLONOSCOPY      done about 10 years ago    COLONOSCOPY  11/10/2016    severe diverticulosis     CYST REMOVAL      groin, left arm    ENDOSCOPY, COLON, DIAGNOSTIC      HEMORRHOID SURGERY      HERNIA REPAIR Right 8/3/2020    HERNIA INGUINAL REPAIR LAPAROSCOPIC ROBOTIC performed by Gely Luis MD at [address] [place] [address] Right 08/03/2020    Robotic Lap. Rt Inguinal Hernia Repair with Mesh    LUNG BIOPSY  2005    left side    OTHER SURGICAL HISTORY      prostate seeding    UPPER GASTROINTESTINAL ENDOSCOPY      done about 10 years ago    UPPER GASTROINTESTINAL ENDOSCOPY  11/10/2016    duodenal bulb polyp, MODERATE GASTRITIS         Medications Prior to Admission:     Prior to Admission medications    Medication Sig Start Date End Date Taking?  Authorizing Provider   tamsulosin (FLOMAX) 0.4 MG capsule Take 0.4 mg by mouth daily   Yes Historical Provider, MD   solifenacin (VESICARE) 10 MG tablet  5/5/21   Historical Provider, MD   fluticasone (FLONASE) 50 MCG/ACT nasal spray 1 spray by Each Nostril route daily as needed for Rhinitis    Historical Provider, MD   ibuprofen (ADVIL;MOTRIN) 400 MG tablet Take 1 tablet by mouth 3 times daily  Patient taking differently: Take 400 mg by mouth every 8 hours as needed  10/19/17   Amy Bell MD   albuterol sulfate HFA (PROAIR HFA) 108 (90 BASE) MCG/ACT inhaler Inhale 2 puffs into the lungs every 6 hours as needed for Wheezing 1/24/17   Anayeli Sales, APRN - CNP   b complex vitamins capsule Take 1 capsule by mouth daily    Historical Provider, MD   Glucos-Chond-Hyal Ac-Ca Fructo (MOVE FREE [employer] [facility] ADVANCE) TABS Take by mouth    Historical Provider, MD   aspirin 81 MG tablet Take 81 mg by mouth daily    Historical Provider, MD        Allergies:     Patient has no known allergies. Social History:     Tobacco:    reports that he quit smoking about 36 years ago. He has a 20.00 pack-year smoking history. He has never used smokeless tobacco.  Alcohol:      reports current alcohol use. Drug Use:  reports no history of drug use. Family History:     Family History   Problem Relation Age of Onset    Cancer Mother     Cancer Father         colon       Review of Systems:     Positive and Negative as described in HPI. Review of Systems   Constitutional: Negative for chills, diaphoresis and fever. HENT: Negative for congestion. Eyes: Negative for visual disturbance. Respiratory: Negative for cough, shortness of breath and wheezing. Cardiovascular: Positive for chest pain. Negative for palpitations and leg swelling. Gastrointestinal: Negative for abdominal pain, blood in stool, constipation, diarrhea, nausea and vomiting. Endocrine: Negative for cold intolerance and heat intolerance. Genitourinary: Negative for difficulty urinating, dysuria, frequency and urgency. Musculoskeletal: Negative for arthralgias and myalgias. Skin: Negative for color change and rash. Neurological: Positive for numbness. Negative for dizziness, weakness, light-headedness and headaches. Hematological: Does not bruise/bleed easily. Psychiatric/Behavioral: The patient is not nervous/anxious. All other systems reviewed and are negative. Physical Exam:   BP (!) 146/85   Pulse 64   Temp 97.4 °F (36.3 °C) (Oral)   Resp 16   Ht 6' 1\" (1.854 m)   Wt 233 lb 7.5 oz (105.9 kg)   SpO2 99%   BMI 30.80 kg/m²   Temp (24hrs), Av.8 °F (36.6 °C), Min:97.4 °F (36.3 °C), Max:98.2 °F (36.8 °C)    No results for input(s): POCGLU in the last 72 hours. No intake or output data in the 24 hours ending 21 0431    Physical Exam  Vitals and nursing note reviewed.    Constitutional: arm motor drift -> 0 = No drift for 10 seconds  5B: Right arm motor drift -> 0 = No drift for 10 seconds  6A: Left leg motor drift -> 0 = No drift for 5 seconds  6B: Right leg motor drift -> 0 = No drift for 5 seconds  7: Limb Ataxia -> 0 = No ataxia  8: Sensation -> 0 = Normal; no sensory loss  9: Language/aphasia -> 0 = Normal; no aphasia  10: Dysarthria -> 0 = Normal  11: Extinction/inattention -> 0 = No abnormality     Psychiatric:         Speech: Speech normal.         Behavior: Behavior normal. Behavior is cooperative.          Investigations:      Laboratory Testing:  Recent Results (from the past 24 hour(s))   CBC Auto Differential    Collection Time: 08/12/21  3:44 PM   Result Value Ref Range    WBC 5.6 3.5 - 11.0 k/uL    RBC 4.91 4.5 - 5.9 m/uL    Hemoglobin 15.0 13.5 - 17.5 g/dL    Hematocrit 44.9 41 - 53 %    MCV 91.4 80 - 100 fL    MCH 30.5 26 - 34 pg    MCHC 33.4 31 - 37 g/dL    RDW 13.6 12.5 - 15.4 %    Platelets 131 691 - 292 k/uL    MPV 9.1 6.0 - 12.0 fL    NRBC Automated NOT REPORTED per 100 WBC    Differential Type NOT REPORTED     Seg Neutrophils 69 (H) 36 - 66 %    Lymphocytes 19 (L) 24 - 44 %    Monocytes 8 2 - 11 %    Eosinophils % 3 1 - 4 %    Basophils 1 0 - 2 %    Immature Granulocytes NOT REPORTED 0 %    Segs Absolute 3.90 1.8 - 7.7 k/uL    Absolute Lymph # 1.10 1.0 - 4.8 k/uL    Absolute Mono # 0.40 0.1 - 1.2 k/uL    Absolute Eos # 0.20 0.0 - 0.4 k/uL    Basophils Absolute 0.00 0.0 - 0.2 k/uL    Absolute Immature Granulocyte NOT REPORTED 0.00 - 0.30 k/uL    WBC Morphology NOT REPORTED     RBC Morphology NOT REPORTED     Platelet Estimate NOT REPORTED    Basic Metabolic Panel w/ Reflex to MG    Collection Time: 08/12/21  3:44 PM   Result Value Ref Range    Glucose 96 70 - 99 mg/dL    BUN 14 8 - 23 mg/dL    CREATININE 0.99 0.70 - 1.20 mg/dL    Bun/Cre Ratio NOT REPORTED 9 - 20    Calcium 9.5 8.6 - 10.4 mg/dL    Sodium 138 135 - 144 mmol/L    Potassium 4.2 3.7 - 5.3 mmol/L    Chloride 104 98 - 107 mmol/L    CO2 25 20 - 31 mmol/L    Anion Gap 9 9 - 17 mmol/L    GFR Non-African American >60 >60 mL/min    GFR African American >60 >60 mL/min    GFR Comment          GFR Staging NOT REPORTED    Troponin    Collection Time: 08/12/21  3:44 PM   Result Value Ref Range    Troponin, High Sensitivity 10 0 - 22 ng/L    Troponin T NOT REPORTED <0.03 ng/mL    Troponin Interp NOT REPORTED        Imaging/Diagnostics:  XR CHEST (2 VW)    Result Date: 8/12/2021  No acute cardiopulmonary process     CT HEAD WO CONTRAST    Result Date: 8/12/2021  Remote lacunar infarct, left basal ganglia no acute intracranial abnormality. CT CERVICAL SPINE WO CONTRAST    Result Date: 8/12/2021  No acute fracture or traumatic malalignment. Overall, no acute abnormality detected within the cervical spine. CTA HEAD NECK W CONTRAST    Result Date: 8/12/2021  1. No large vessel occlusion of the intracranial arteries. 2. 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 Otherwise, no hemodynamically significant arterial stenosis in the head or neck. Assessment :      Hospital Problems         Last Modified POA    * (Principal) TIA (transient ischemic attack) 8/13/2021 Yes    Dyslipidemia 8/13/2021 Yes    Gastroesophageal reflux disease 8/13/2021 Yes    Paresthesia 8/13/2021 Yes    Class 1 obesity due to excess calories in adult 8/13/2021 Yes        Plan:     Patient status inpatient in the  Progressive Unit/Step down unit. 1. Consult neurology, appreciate recommendations. 2. MRI brain. 3. Aspirin, plavix. 4. NIHSS. 5. Trend troponin, repeat EKG. 6. Dyslipidemia- statin, check lipid profile. 7. GERD- protonix. 8. Obesity- lifestyle modifications. 9. Monitor vital signs. 10. Follow chemistries. 11. Replete electrolytes as needed. 12. DVT prophylaxis. 13. Supplemental oxygen as needed. 14. Nursing bedside swallow eval- if patient passes may start general diet.   15. Activity as tolerated with assist.  16. PT/OT/SLP. 17. Patient wished to discuss CODE STATUS. After an at length discussion, he wishes to be a DNR-CCA. No intubation, no CPR. Plan of care discussed with Earl Connors. Consultations:   IP CONSULT TO NEUROLOGY    Patient is admitted as inpatient status because of co-morbidities listed above, severity of signs and symptoms as outlined, requirement for current medical therapies and most importantly because of direct risk to patient if care not provided in a hospital setting. Expected length of stay > 48 hours.     SHAHZAD Bird CNP  8/13/2021  4:31 AM    Copy sent to SHAHZAD Ashford - DURAN

## 2021-08-13 NOTE — CONSULTS
[facility] Neurology   IN-PATIENT SERVICE      NEUROLOGY CONSULT  NOTE            Date:   8/13/2021  Patient name:  Moises Byrne  Date of admission:  8/13/2021  YOB: 1940      Chief Complaint:     Left-sided paresthesias    Reason for Consult:      Left-sided paresthesias    History of Present Illness: The patient is a 68 y.o. male who presents with left-sided paresthesias. The patient was seen and examined and the chart was reviewed. Patient states yesterday he woke up with a feeling of tingling in his left upper extremity and also portion of his leg. He woke up around 9 AM and this persisted till approximately 3 PM when he presented to the ED at [facility].  He underwent CT of the head showed no acute intracranial abnormalities. He underwent CTA of the head and neck showing moderate stenosis of the right MCA, M1 segment. Patient was loaded on aspirin 325 mg, Plavix 300 mg x 1. Recommendation for admission for further stroke work-up. Hospital beds were full at [facility] so [facility] for further work-up. He underwent MRI of the brain which did not reveal any acute intracranial abnormalities. No evidence of stroke. He is awaiting remainder of stroke work-up including 2D echo, hemoglobin A1c, lipid panel. Systolic blood pressures ranging 130140s. At this time he states he is feeling well. He has had no further episodes of left sided tingling or numbness. He denies any significant medical history besides medication for his prostate. States he has had high cholesterol in the past and was on simvastatin but stopped this due to muscle cramping.   Denies any smoking in the past.    Past Medical History:     Past Medical History:   Diagnosis Date    COPD (ch[facility] obstructive [facility] disease) ([facility] [address]     Diverticulosis     Gastritis     GERD (gastroesophageal reflux disease)     History of thoracentesis 2005    left    Hyperlipidemia     Inguinal hernia 08/03/2020    right    Multiple lung nodules 2005    left    Prostate cancer ([address]     Seasonal allergies         Past Surgical History:     Past Surgical History:   Procedure Laterality Date    CARDIAC CATHETERIZATION  2005    no stents reported    CATARACT REMOVAL WITH IMPLANT Bilateral     COLONOSCOPY      done about 10 years ago    COLONOSCOPY  11/10/2016    severe diverticulosis     CYST REMOVAL      groin, left arm    ENDOSCOPY, COLON, DIAGNOSTIC      HEMORRHOID SURGERY      HERNIA REPAIR Right 8/3/2020    HERNIA INGUINAL REPAIR LAPAROSCOPIC ROBOTIC performed by Claire Klein MD at [facility] Right 08/03/2020    Robotic Lap. Rt Inguinal Hernia Repair with Mesh    LUNG BIOPSY  2005    left side    OTHER SURGICAL HISTORY      prostate seeding    UPPER GASTROINTESTINAL ENDOSCOPY      done about 10 years ago    UPPER GASTROINTESTINAL ENDOSCOPY  11/10/2016    duodenal bulb polyp, MODERATE GASTRITIS         Medications Prior to Admission:     Prior to Admission medications    Medication Sig Start Date End Date Taking?  Authorizing Provider   tamsulosin (FLOMAX) 0.4 MG capsule Take 0.4 mg by mouth daily   Yes Historical Provider, MD   solifenacin (VESICARE) 10 MG tablet  5/5/21   Historical Provider, MD   fluticasone (FLONASE) 50 MCG/ACT nasal spray 1 spray by Each Nostril route daily as needed for Rhinitis    Historical Provider, MD   ibuprofen (ADVIL;MOTRIN) 400 MG tablet Take 1 tablet by mouth 3 times daily  Patient taking differently: Take 400 mg by mouth every 8 hours as needed  10/19/17   Maryjane Jernigan MD   albuterol sulfate HFA (PROAIR HFA) 108 (90 BASE) MCG/ACT inhaler Inhale 2 puffs into the lungs every 6 hours as needed for Wheezing 1/24/17   SHAHZAD Lennon - CNP   b complex vitamins capsule Take 1 capsule by mouth daily    Historical Provider, MD   Glucos-Chond-Hyal Ac-Ca Fructo (MOVE FREE [facility] ADVANCE) TABS Take by mouth Imaging/Diagnostics:        CT head: No acute process. Remote lacunar infarct basal ganglia on the left. CTA head and neck: No large vessel occlusion. Moderate stenosis of M1 segment right MCA. MRI brain: No acute intracranial abnormalities. 2D echo: Pending     I personally reviewed all of the above medications, clinical laboratory, imaging and other diagnostic tests. Impression:      1. Left-sided paresthesias, UE > LE; consistent with right hemispheric TIA in the setting of right MCA, M1 segment moderate stenosis  2. Hyperlipidemia    Plan:      2D echo with bubble study   Check lipid panel, hemoglobin A1c   Aspirin 81 mg, Plavix 75 mg x 90 days for intracranial stenosis   High intensity statin   PT OT and speech therapies   If above echo unremarkable, patient stable neurologically for discharge. Recommend following up as outpatient in the next 4 to 6 weeks       Thank you for this very interesting consultation.       Electronically signed by Rex Medina DO on 8/13/2021 at 11:36 AM      Rex Medina, [address] [place] [address]  Neurology

## 2021-08-13 NOTE — PROGRESS NOTES
Transitions of Care Pharmacy Service   Medication Review    The patient's list of current home medications has been reviewed. Source(s) of information: patient, Epic, Care Everywhere, Surescripts refill report      PROVIDER ACTION REQUESTED  Medications that need to be addressed by a physician/nurse practitioner:    Medication Action Requested   Flomax 0.4mg   Not a current home med -- please d/c as appropriate    Vesicare needs physician review           Please feel free to call me with any questions about this encounter. Thank you.     Jake Garrison [facility]   Transitions of Care Pharmacy Service  Phone:  [phone]  Fax: [phone]      Electronically signed by Jake Garrison [facility] on 8/13/2021 at 2:06 PM           Medications Prior to Admission:   vitamin D (CHOLECALCIFEROL) 25 MCG (1000 UT) TABS tablet, Take 1,000 Units by mouth daily  solifenacin (VESICARE) 10 MG tablet,   albuterol sulfate HFA (PROAIR HFA) 108 (90 BASE) MCG/ACT inhaler, Inhale 2 puffs into the lungs every 6 hours as needed for Wheezing  Glucos-Chond-Hyal Ac-Ca Fructo (MOVE FREE [facility] ADVANCE) TABS, Take 1 tablet by mouth daily

## 2021-08-13 NOTE — PROGRESS NOTES
Patient admitted from [facility] ED via stretcher to Progressive Care Unit, room 1009. Placed on heart monitor. Vital signs taken. Admission database & MRI screening completed. Will monitor.

## 2021-08-13 NOTE — PROGRESS NOTES
Physical Therapy    Facility/Department: [facility] PROGRESSIVE CARE  Initial Assessment    NAME: Shnatel Frank  : 1940  MRN: [id number]    Date of Service: 2021    Discharge Recommendations:  Patient would benefit from continued therapy after discharge       Pt presented to ED on 21 with complaints of abnormal feeling in his left arm and left leg which was later described as left numbness in the left arm and just abnormal feeling in the left leg. Physical exam revealed no other obvious abnormalities. NIH stroke scale is 1 for some subjective numbness, no TPA indicated. CT of the head showed a remote lacunar infarct. His symptoms did not resolve in the ED and he is on aspirin at home but no statin. No significant or recent stroke work-up. Does need to be admitted for further evaluation and treatment including MRI and neurology eval.  We discussed the case with neurology and they did request CTA head/neck   Pt admitted to the hospital for the management of TIA      RN reports patient is medically stable for therapy treatment this date. Chart reviewed prior to treatment and patient is agreeable for therapy. Assessment   Body structures, Functions, Activity limitations: Decreased balance  Assessment: Pt with minimal deficits noted in bed mobility, transfers, ambulation, balance, and endurance this session. Pt requires continued PT to maximize independence with functional mobility, balance, safety awareness & activity tolerance. Due to recent hospitalization and medical condition, pt would benefit from additional therapy at time of discharge to ensure safety. Please refer to the AM-PAC score for current functional status.   Prognosis: Good  Decision Making: Medium Complexity  Exam: ROM, MMT, functional mobility, activity tolerance, Balance, & [employer] AM-PAC 6 Clicks Basic Mobility  Clinical Presentation: Stable  PT Education: Goals;PT Role;Plan of Care;General Safety  Patient Education: Ed pt on safety awareness, importance of being up & OOB to regain strength, circulation ex's, pacing & optimal breathing techniques. REQUIRES PT FOLLOW UP: Yes  Activity Tolerance  Activity Tolerance: Patient Tolerated treatment well       Patient Diagnosis(es): There were no encounter diagnoses. has a past medical history of COPD (chronic obstructive pulmonary disease) ([facility] [address] Diverticulosis, Gastritis, GERD (gastroesophageal reflux disease), History of thoracentesis, Hyperlipidemia, Inguinal hernia, Multiple lung nodules, Prostate cancer ([facility] [address] and Seasonal allergies. has a past surgical history that includes Hemorrhoid surgery; Lung biopsy (2005); Colonoscopy; Upper gastrointestinal endoscopy; Endoscopy, colon, diagnostic; cyst removal; other surgical history; Colonoscopy (11/10/2016); Upper gastrointestinal endoscopy (11/10/2016); Cardiac catheterization (2005); Cataract removal with implant (Bilateral); Inguinal hernia repair (Right, 08/03/2020); and hernia repair (Right, 8/3/2020).     Restrictions  Restrictions/Precautions  Restrictions/Precautions: General Precautions, Fall Risk, Up as Tolerated  Position Activity Restriction  Other position/activity restrictions: DNRCC, LUE IV, alarms  Vision/Hearing  Vision: Impaired  Vision Exceptions: Wears glasses for reading (pt denies changes)  Hearing: Exceptions to [facility]  Hearing Exceptions: Bilateral hearing aid     Subjective  General  Chart Reviewed: Yes  Patient assessed for rehabilitation services?: Yes  Additional Pertinent Hx: COPD  General Comment  Comments: RN okays PT  Subjective  Subjective: Pt is agreeable PT  Pain Screening  Patient Currently in Pain: Denies  Vital Signs  Patient Currently in Pain: Denies       Orientation  Orientation  Overall Orientation Status: Within Functional Limits  Orientation Level: Oriented to place;Oriented to time;Oriented to situation;Oriented to person  Social/Functional History  Social/Functional History  Lives With: Alone  Type of Home: House  Home Layout: Two level, Able to Live on Main level with bedroom/bathroom, Laundry in basement (full bath on main; FF L HR going down to basement. FF steps with R HR to 2nd floor)  Home Access: Stairs to enter with rails (back door)  Entrance Stairs - Number of Steps: 6  Entrance Stairs - Rails: Left  Bathroom Shower/Tub: Walk-in shower (upstairs)  Bathroom Toilet: Handicap height (vanity close on main floor toilet)  Bathroom Equipment: Grab bars in shower, Shower chair  Home Equipment: Rolling walker, Cane  Receives Help From: Family (pt states his son is 30 miles away but is supportive)  ADL Assistance: Independent  Homemaking Assistance: Independent  Homemaking Responsibilities: Yes  Ambulation Assistance: Independent  Transfer Assistance: Independent  Active : Yes  Occupation: Retired  Type of occupation: construction and   Leisure & Hobbies: mowing lawn  Additional Comments: Pt denies falls. Cognition   Cognition  Overall Cognitive Status: Exceptions  Arousal/Alertness: Appropriate responses to stimuli  Following Commands: Follows all commands without difficulty  Attention Span: Appears intact  Memory: Decreased short term memory  Safety Judgement: Decreased awareness of need for safety  Problem Solving: Assistance required to correct errors made;Assistance required to identify errors made;Decreased awareness of errors  Insights: Fully aware of deficits  Initiation: Does not require cues  Sequencing: Does not require cues    Objective     Observation/Palpation  Posture: Good (no AD)  Observation: Pt resting in bed & appears comfortable.   Edema: slight B feet    AROM RLE (degrees)  RLE AROM: WFL  AROM LLE (degrees)  LLE AROM : WFL  AROM RUE (degrees)  RUE General AROM: see OT assessment  AROM LUE (degrees)  LUE General AROM: see OT assessment  Strength RLE  Strength RLE: WFL  Comment: 5/5  Strength LLE  Strength LLE: WFL  Comment: 5/5  Strength RUE  Comment: see OT assessment  Strength LUE  Comment: see OT assessment  Tone RLE  RLE Tone: Normotonic  Tone LLE  LLE Tone: Normotonic  Motor Control  Gross Motor?: WFL  Sensation  Overall Sensation Status: WFL (pt denies numbness at this time and states resolved in LUE)  Bed mobility  Rolling to Right: Supervision  Supine to Sit: Supervision  Sit to Supine: Unable to assess  Comment: Pt demonstrated good use of bed rail as needed and required min cues for pacing/slow down movements. Transfers  Sit to Stand: Stand by assistance  Stand to sit: Stand by assistance  Bed to Chair: Stand by assistance  Stand Pivot Transfers: Stand by assistance  Comment: Pt demonstrated proper use of upper body for safe sit/stand. Ambulation  Ambulation?: Yes  Ambulation 1  Surface: level tile  Device: No Device  Assistance: Stand by assistance  Quality of Gait: step through pattern, safety cues for pacing/slowing down movements  Distance: 30ft  Comments: Pt amb from bed to door to window then to sink. Pt stood at sink for 5 min to brush teeth. Pt reported that he is typically a little unsteady when walking & received ed on pacing/slowing down movements during amb. Balance  Sitting - Static: Good  Sitting - Dynamic: Good  Standing - Static: Good  Standing - Dynamic: Good;-       All lines intact, call light within reach, and patient positioned comfortably at end of treatment. All patient needs addressed prior to ending therapy session.         Plan   Plan  Times per week: 1x/d, 3-4d/wk  Current Treatment Recommendations: Balance Training, Safety Education & Training, Patient/Caregiver Education & Training  Safety Devices  Type of devices: Call light within reach, Chair alarm in place, Gait belt, Patient at risk for falls, Left in chair    G-Code       OutComes Score                                                  AM-PAC Score  AM-PAC Inpatient Mobility Raw Score : 24 (08/13/21 0900)  AM-PAC Inpatient T-Scale Score : 61.14 (08/13/21 0900)  Mobility Inpatient CMS 0-100% Score: 0 (08/13/21 0900)  Mobility Inpatient CMS G-Code Modifier : [facility] (08/13/21 0900)          Goals  Short term goals  Time Frame for Short term goals: 6 visits  Short term goal 1: Inc gait to amb 400ft or > indep to enable pt to return to previous level of independence & tolerance of community ambulation. Short term goal 2: Pt able to go up/down 6 steps with one rail indep.   Short term goal 3: Ed pt on home ex's, safety & energy principles, fall prevention, & issue written home program.       Therapy Time   Individual Concurrent Group Co-treatment   Time In 0722         Time Out 0756         Minutes 34+10=44             Additional 10 minutes for chart review           Sylvia Contreras                Evaluation, Treatment & documentation completed by Sylvia Contreras, Student DPT  Supervised & co-signed by Riley Swift PT

## 2021-08-13 NOTE — PLAN OF CARE
Problem: Falls - Risk of:  Goal: Will remain free from falls  Description: Will remain free from falls  Outcome: Ongoing  Note: Pt fall risk, fall band present, falling star, safety alarm activated and in use as needed. Hourly rounding performed. Pt encouraged to use call light. See Garrett Duff fall risk assessment. Goal: Absence of physical injury  Description: Absence of physical injury  Outcome: Ongoing  Note: Non-skid socks in place, up with assistance, bed in lowest position, bed exit & alarm as needed, provide toileting every 2 hours an d as needed. Problem: HEMODYNAMIC STATUS  Goal: Patient has stable vital signs and fluid balance  Outcome: Ongoing  Note: Monitor vital signs at least every shift & as needed. Monitor intake & output at least every shift. Problem: ACTIVITY INTOLERANCE/IMPAIRED MOBILITY  Goal: Mobility/activity is maintained at optimum level for patient  Outcome: Ongoing  Note: Assessed musculoskeletal functional level. Assessed ROM & ability to care for self. Problem: COMMUNICATION IMPAIRMENT  Goal: Ability to express needs and understand communication  Outcome: Ongoing  Note: Assess communication skills & deficits. Provide alternative means of communication. Work with case management regarding discharge needs & followup. Problem: Discharge Planning:  Goal: Discharged to appropriate level of care  Description: Discharged to appropriate level of care  Outcome: Ongoing  Note: Discharge teaching and instructions for diagnosis/procedure explained with patient using teachback method. A Patient voiced understanding regarding prescriptions, follow up appointments, and care of self at home. Problem: Verbal Communication - Impaired:  Goal: Functional communication will improve  Description: Functional communication will improve  Outcome: Ongoing  Note: Assess communication skills & deficits. Provide alternative means of communication.   Work with case management regarding discharge needs & followup. Goal: Ability to interact with others will improve  Description: Ability to interact with others will improve  Outcome: Ongoing  Goal: Ability to establish a method of communication will improve  Description: Ability to establish a method of communication will improve  Outcome: Ongoing     Problem: Pain:  Goal: Pain level will decrease  Description: Pain level will decrease  Outcome: Ongoing  Note: Monitoring pain with each assessment and prn. DARCI 0-10 pain scale utilized. Non-pharmacological measures to be encouraged prior to pharmacological measures. Goal: Recognizes and communicates pain  Description: Recognizes and communicates pain  Outcome: Ongoing     Problem: Mobility - Impaired:  Goal: Able to ambulate independently  Description: Able to ambulate independently  Outcome: Ongoing  Note: Assessed musculoskeletal functional level. Assessed ROM & ability to care for self. Goal: Able to ambulate with minimal assistance  Description: Able to ambulate with minimal assistance  Outcome: Ongoing  Goal: Ability to appropriately use an adaptive device for ambulation will improve  Description: Ability to appropriately use an adaptive device for ambulation will improve  Outcome: Ongoing  Goal: Able to verbalize acceptance of life and situations over which he or she has no control  Description: Absence of contracture deformity  Outcome: Ongoing     Problem: Autonomic Dysreflexia - Risk of:  Goal: Absence of autonomic dysreflexia signs and symptoms  Description: Absence of autonomic dysreflexia signs and symptoms  Outcome: Ongoing     Problem: Injury - Risk of, Healthcare-Acquired Condition:  Goal: Will remain free from falls  Description: Will remain free from falls  Outcome: Ongoing  Note: Pt fall risk, fall band present, falling star, safety alarm activated and in use as needed. Hourly rounding performed. Pt encouraged to use call light. See Peri Bhat fall risk assessment.    Goal: Absence of healthcare acquired conditions  Description: Absence of healthcare acquired conditions  Outcome: Ongoing  Goal: Absence of pressure ulcer  Description: Absence of pressure ulcer  Outcome: Ongoing  Goal: Demonstration of wound healing without infection will improve  Description: Demonstration of wound healing without infection will improve  Outcome: Ongoing  Goal: Absence of urinary tract infection signs and symptoms  Description: Absence of urinary tract infection signs and symptoms  Outcome: Ongoing     Problem: Nutrition Deficit - Risk of:  Goal: Ability to achieve adequate nutritional intake will improve  Description: Ability to achieve adequate nutritional intake will improve  Outcome: Ongoing  Note: Review diet daily and as needed with pt. Provide supplement nutrition as ordered by physician & educate pt. Review nutritional guidelines with pt. Goal: Maintenance of adequate weight for body size and type will improve to within specified parameters  Description: Maintenance of adequate weight for body size and type will improve to within specified parameters  Outcome: Ongoing     Problem: Swallowing - Impaired:  Goal: Able to swallow without choking  Description: Able to swallow without choking  Outcome: Ongoing  Note: Assess ability to chew & swallow. Monitor for aspiration. Maintain NPO status is ordered. Goal: Absence of aspiration  Description: Absence of aspiration  Outcome: Ongoing  Note: Assessed for risk for aspiration. NPO if ordered by physician or if unable to swallow. Assessed breath sounds & ability to swallow. Assessed emesis PRN.       Problem: Aspiration - Risk of:  Goal: Absence of aspiration  Description: Absence of aspiration  Outcome: Ongoing     Problem: Respiratory Function - Compromised:  Goal: Absence of pulmonary infection  Description: Absence of pulmonary infection  Outcome: Ongoing  Goal: Levels of oxygenation will improve  Description: Levels of oxygenation will improve  Outcome: Ongoing  Goal: Increase in ventilator-free time  Description: Increase in ventilator-free time  Outcome: Ongoing  Goal: Ability to maintain a clear airway will improve  Description: Ability to maintain a clear airway will improve  Outcome: Ongoing     Problem: Bowel Function - Altered:  Goal: Bowel elimination is within specified parameters  Description: Bowel elimination is within specified parameters  Outcome: Ongoing  Note: Assessed bowel function. Assessed for active bowel sounds and/or flatus. Assessed for number, form, & frequency of stools. Goal: Control of bowel function will improve  Description: Control of bowel function will improve  Outcome: Ongoing  Goal: Ability to maintain normal bowel function will improve  Description: Ability to maintain normal bowel function will improve  Outcome: Ongoing     Problem: Urinary Elimination - Impaired:  Goal: Urinary elimination within specified parameters  Description: Urinary elimination within specified parameters  Outcome: Ongoing  Note: Assess intake & output. Maintain hydration as needed. Provide perineal care. Assist with toileting every 2 hours & as needed. Assess for infection.    Goal: Absence of postvoid residual urine  Description: Absence of postvoid residual urine  Outcome: Ongoing  Goal: Absence of incontinence - Urinary  Description: Absence of incontinence - Urinary  Outcome: Ongoing  Goal: Reports of episodes of incontinence will decrease - Urinary  Description: Reports of episodes of incontinence will decrease - Urinary  Outcome: Ongoing     Problem: Self-Care Deficit:  Goal: Ability to perform activities of daily living will improve  Description: Ability to perform activities of daily living will improve  Outcome: Ongoing  Goal: Able to perform ADL with assistance  Description: Able to perform ADL with assistance  Outcome: Ongoing  Goal: Ability to communicate needs accurately will improve - ADL needs  Description: Ability

## 2021-08-13 NOTE — PROGRESS NOTES
Occupational Therapy   Occupational Therapy Initial Assessment  Date: 2021   Patient Name: Monroe Morales  MRN: [id number]     : 1940    RN Stefani Salgado reports patient is medically stable for therapy treatment this date. Chart reviewed prior to treatment and patient is agreeable for therapy. All lines intact and patient positioned comfortably at end of treatment. All patient needs addressed prior to ending therapy session. Pt presenting with new neuromuscular dysfunction and would benefit from additional therapy at time of discharge. Please refer to the AM-PAC score for current functional status. Date of Service: 2021    Discharge Recommendations:  Patient would benefit from continued therapy after discharge  OT Equipment Recommendations  Equipment Needed: Yes  Mobility Devices: ADL Assistive Devices  ADL Assistive Devices: Reacher;Long-handled Shoe Horn;Long-handled Sponge;Emergency Alert System    Assessment   Performance deficits / Impairments: Decreased functional mobility ; Decreased safe awareness;Decreased balance;Decreased ADL status; Decreased high-level IADLs;Decreased endurance  Assessment: Continue with OT skills to return pt to PLOL/I level as well as improve safety awareness in function to return home as able and with assist as needed. Prognosis: Good  Decision Making: Low Complexity  OT Education: OT Role;Plan of Care;Energy Conservation;Transfer Training  Patient Education: safety in function, call light use/fall prevention, recommendations for continued therapy  REQUIRES OT FOLLOW UP: Yes  Activity Tolerance  Activity Tolerance: Patient limited by fatigue;Patient Tolerated treatment well  Activity Tolerance: fair minus  Safety Devices  Safety Devices in place: Yes  Type of devices: Call light within reach; Chair alarm in place; Patient at risk for falls; Left in chair;Gait belt;Nurse notified           Patient Diagnosis(es): There were no encounter diagnoses.      has a past medical history of COPD (chronic obstructive pulmonary disease) ([facility] [address] Diverticulosis, Gastritis, GERD (gastroesophageal reflux disease), History of thoracentesis, Hyperlipidemia, Inguinal hernia, Multiple lung nodules, Prostate cancer ([facility] [address] and Seasonal allergies. has a past surgical history that includes Hemorrhoid surgery; Lung biopsy (2005); Colonoscopy; Upper gastrointestinal endoscopy; Endoscopy, colon, diagnostic; cyst removal; other surgical history; Colonoscopy (11/10/2016); Upper gastrointestinal endoscopy (11/10/2016); Cardiac catheterization (2005); Cataract removal with implant (Bilateral); Inguinal hernia repair (Right, 08/03/2020); and hernia repair (Right, 8/3/2020).    PER H&P: Michael Navarro is a 68 y.o. Non- / non  male who presents with No chief complaint on file. and is admitted to the hospital for the management of TIA (transient ischemic attack).    The patient reports to the hospital with complaint of numbness and tingling in his left arm and leg. He states that 2 days ago he started experiencing a discomfort in his left chest that, he states it was mild, intermittent and dull. He reports that he woke up on 8/12/2021 around 9:30am and experienced numbness and tingling in his left hand, arm and leg. He has difficulty describing the sensation. He denies any loss of strength to his left side. No blurred vision, slurred speech or facial droop. He states that his symptoms resolved after he reported to the emergency department that afternoon. He is currently symptom free. He denies fever, chills, nausea, vomiting or shortness of breath. No additional symptomology or further modifying factors. He has past medical history that includes dyslipidemia and GERD.       Restrictions  Restrictions/Precautions  Restrictions/Precautions: General Precautions, Fall Risk, Up as Tolerated  Position Activity Restriction  Other position/activity restrictions: MILYRCTENZIN JAY IV, alarms    Subjective   General  Chart Reviewed: Yes  Patient assessed for rehabilitation services?: Yes  Family / Caregiver Present: No  Patient Currently in Pain: Denies    Social/Functional History  Social/Functional History  Lives With: Alone  Type of Home: House  Home Layout: Two level, Able to Live on Main level with bedroom/bathroom, Laundry in basement (full bath on main; FF L HR going down to basement. FF steps with R HR to 2nd floor)  Home Access: Stairs to enter with rails (back door)  Entrance Stairs - Number of Steps: 6  Entrance Stairs - Rails: Left  Bathroom Shower/Tub: Walk-in shower (upstairs)  Bathroom Toilet: Handicap height (vanity close on main floor toilet)  Bathroom Equipment: Grab bars in shower, Shower chair  Home Equipment: Rolling walker, Cane  Receives Help From: Family (pt states his son is 30 miles away but is supportive)  ADL Assistance: Independent  Homemaking Assistance: Independent  Homemaking Responsibilities: Yes  Ambulation Assistance: Independent  Transfer Assistance: Independent  Active : Yes  Occupation: Retired  Type of occupation: construction and   Leisure & Hobbies: mowing lawn  Additional Comments: Pt denies falls. Objective   Vision: Impaired  Vision Exceptions: Wears glasses for reading (pt denies changes)  Hearing: Exceptions to [facility]  Hearing Exceptions: Bilateral hearing aid    Orientation  Overall Orientation Status: Within Functional Limits  Observation/Palpation  Posture: Good (no AD)  Observation: LUE IV  Edema: slight B feet  Balance  Sitting Balance: Supervision  Standing Balance: Stand by assistance  Standing Balance  Time: stand geovanna 3-4 mins with functional tasks  Functional Mobility  Functional - Mobility Device: No device  Activity:  (L side of bed to door and back to window and then to sink; sink back to bedside chair)  Functional Mobility Comments: MIN cues for pacing and slowing down movements to increase overall safety.   Toilet Transfers  Toilet Transfers Comments: N/T and pt with no needs     ADL  Feeding: Independent  Grooming: Setup;Stand by assistance  UE Bathing: Setup;Stand by assistance  LE Bathing: Setup;Stand by assistance  UE Dressing: Setup;Stand by assistance  LE Dressing: Setup;Stand by assistance (pt was able to gilbert B socks seated EOB with set up and crossing BLE's)  Toileting: Setup;Stand by assistance  Tone RUE  RUE Tone: Normotonic  Tone LUE  LUE Tone: Normotonic  Coordination  Movements Are Fluid And Coordinated: Yes  Coordination and Movement description: Fine motor impairments     Bed mobility  Supine to Sit: Supervision  Sit to Supine: Unable to assess (pt agreed to sit up in chair after edu on benefits of being up OOB as able)  Comment: MIn cues to slow down movements to increase safety; pt with good use of bed rail as needed. Transfers  Stand Step Transfers: Stand by assistance (no AD)  Sit to stand: Stand by assistance  Stand to sit: Stand by assistance  Transfer Comments: MIN cues for pacing and slowing down movements to increase overall safety as well as hand placement reaching back to surface . Cognition  Overall Cognitive Status: Exceptions  Arousal/Alertness: Appropriate responses to stimuli  Following Commands:  Follows all commands without difficulty  Attention Span: Appears intact  Memory: Decreased short term memory  Safety Judgement: Decreased awareness of need for safety  Problem Solving: Assistance required to correct errors made;Assistance required to identify errors made;Decreased awareness of errors  Insights: Fully aware of deficits  Initiation: Does not require cues  Sequencing: Does not require cues  Perception  Overall Perceptual Status: WFL     Sensation  Overall Sensation Status: WFL (pt denies numbness at this time and states resolved in LUE)        LUE AROM (degrees)  LUE AROM : WFL  RUE AROM (degrees)  RUE AROM : WFL  LUE Strength  Gross LUE Strength: WFL  LUE Strength Comment: IBAN strength grossly 5/5  RUE Strength  Gross RUE Strength: WFL                   Plan   Plan  Times per week: 4-5x/wek 1x/day as geovanna  Current Treatment Recommendations: Strengthening, Neuromuscular Re-education, Endurance Training, Patient/Caregiver Education & Training, Equipment Evaluation, Education, & procurement, Self-Care / ADL, Home Management Training, Safety Education & Training, Functional Mobility Training, Balance Training                                                  AM-PAC Score   19          Goals  Short term goals  Time Frame for Short term goals: by discharge, pt to demo  Short term goal 1: bed mob with use of rail as needed to MOD I-I. Short term goal 2: I with BUE HEP with use of handouts as needed to maintain strength. Short term goal 3: total body ADL tasks with use of AE/grab bar as needed to MOD I-I. Short term goal 4: ADL transfers and functional mob to I. Short term goal 5: standing to SUP geovanna > 7 mins as needed to reduce falls in function. Long term goals  Long term goal 1: Pt to be I with EC/WS and fall prevention tech as well as DME/AE recommendations with use of handouts. Patient Goals   Patient goals : Pt states hopefully get out of here soon!        Therapy Time   Individual Concurrent Group Co-treatment   Time In 6550 (plus 10 min chart review/RN communication)         Time Out 0756         Minutes 35         Timed Code Treatment Minutes: Yuriy Nixon OT

## 2021-08-13 NOTE — RT PROTOCOL NOTE
work of breathing using Per Protocol order mode. Repeat RT Therapy Protocol Assessment with second treatment then BID and as needed. If Albuterol Inhaler not tolerated or not effective, then discontinue the Albuterol Inhaler orders and enter two Albuterol Nebulizer orders with same frequencies and PRN reasons. 11-13 - discontinue any other Inpatient aerosolized bronchodilator medication orders and enter DuoNeb Nebulizer orders QID frequency and an Albuterol Nebulizer order every 2 hours PRN wheezing or increased work of breathing using Per Protocol order mode. Repeat RT Therapy Protocol Assessment with second treatment then QID and as needed. Greater than 13 - discontinue any other Inpatient bronchodilator aerosolized medication orders and enter DuoNeb Nebulizer order every 4 hours frequency and Albuterol Nebulizer every 2 hours PRN wheezing or increased work of breathing using Per Protocol order mode. Repeat RT Therapy Protocol Assessment with second treatment then every 4 hours and as needed. RT to enter RT Home Evaluation for COPD & MDI Assessment order using Per Protocol order mode.     Electronically signed by Maricel Lemos RCP on 8/13/2021 at 5:39 AM

## 2021-08-13 NOTE — PROGRESS NOTES
Speech Language Pathology  Facility/Department: [facility]  Initial Speech/Language/Cognitive Assessment    NAME: Wanna Lesch  : 1940   MRN: [id number]  ADMISSION DATE: 2021  ADMITTING DIAGNOSIS: has Dyslipidemia; Gastroesophageal reflux disease; Family history of colon cancer; Diverticulosis; Gastritis; Diverticulosis of intestine without bleeding; Gastritis without bleeding; Dysphagia; Environmental allergies; History of prostate cancer; TIA (transient ischemic attack); Paresthesia; and Class 1 obesity due to excess calories in adult on their problem list.    Date of Eval: 2021   Evaluating Therapist: ANAYA Castellanos    RECENT RESULTS  CT OF HEAD/MRI: MRI 21  Impression   Mild chronic microvascular disease without acute intracranial abnormality. Primary Complaint: Wanna Lesch is a 68 y.o. Non- / non  male who presents with No chief complaint on file. and is admitted to the hospital for the management of TIA (transient ischemic attack).    The patient reports to the hospital with complaint of numbness and tingling in his left arm and leg. He states that 2 days ago he started experiencing a discomfort in his left chest that, he states it was mild, intermittent and dull. He reports that he woke up on 2021 around 9:30am and experienced numbness and tingling in his left hand, arm and leg. He has difficulty describing the sensation. He denies any loss of strength to his left side. No blurred vision, slurred speech or facial droop. He states that his symptoms resolved after he reported to the emergency department that afternoon. He is currently symptom free. He denies fever, chills, nausea, vomiting or shortness of breath. No additional symptomology or further modifying factors. He has past medical history that includes dyslipidemia and GERD.      The patient originally presented to [facility] ER.  CT head, CTA head and neck were obtained as well as neurology consultation. The patient was given a loading dose of 300mg plavix and 325mg aspirin.        Pain:  Pain Assessment  Pain Assessment: 0-10  Pain Level: 0    Assessment: Pt presents with no apparent cognitive deficits at this time. No dysarthria noted, no oral motor deficits. No further ST is recommended. Verbal and written education provided. Gibran Emmanuel M.S. Runkelen  8/13/2021             Recommendations:  Requires SLP Intervention: No     D/C Recommendations: No therapy recommended at discharge. Subjective:   Previous level of function and limitations:   General  Chart Reviewed: Yes  Patient assessed for rehabilitation services?: Yes  Family / Caregiver Present: No  Social/Functional History  Lives With: Alone  Active : Yes  Mode of Transportation: Car  Hearing  Hearing: Exceptions to [employer] [facility]  Hearing Exceptions: Bilateral hearing aid;Hard of hearing/hearing concerns           Objective:     Oral/Motor  Oral Motor: Within functional limits              Expression  Primary Mode of Expression: Verbal              Motor Speech  Motor Speech:  Within Functional Limits         Cognition:      Orientation  Overall Orientation Status: Within Normal Limits  Attention  Attention: Within Functional Limits  Memory  Memory: Within Funtional Limits  Problem Solving  Problem Solving: Within Functional Limits  Abstract Reasoning  Abstract Reasoning: Within Functional Limits  Safety/Judgement  Safety/Judgement: Within Functional Limits  Generative naming: WFL    Prognosis:  Speech Therapy Prognosis  Prognosis: Good  Individuals consulted  Consulted and agree with results and recommendations: Patient;RN    Education:  Patient Education: yes  Patient Education Response: Verbalizes understanding          Therapy Time:   Individual Concurrent Group Co-treatment   Time In 0826         Time Out 1254         Minutes [id number] [place], [place]  8/13/2021 12:58 PM

## 2021-08-13 NOTE — PLAN OF CARE
Problem: Falls - Risk of:  Goal: Will remain free from falls  Description: Will remain free from falls  8/13/2021 1235 by Gato Álvarez RN  Outcome: Ongoing  Note: Hourly rounding performed. Pt encouraged to use call light. See [facility] fall risk assessment. Problem: Falls - Risk of:  Goal: Absence of physical injury  Description: Absence of physical injury  8/13/2021 0503 by Mulugeta Sunshine RN  Outcome: Ongoing  Note: Non-skid socks in place, up with assistance, bed in lowest position, bed exit & alarm as needed, provide toileting every 2 hours an d as needed. Call light within reach. Problem: HEMODYNAMIC STATUS  Goal: Patient has stable vital signs and fluid balance  8/13/2021 1235 by Gato Álvarez RN  Outcome: Ongoing  8/13/2021 0503 by Mulugeta Sunshine RN  Outcome: Ongoing  Note: Monitor vital signs at least every shift & as needed. Monitor intake & output at least every shift. Problem: ACTIVITY INTOLERANCE/IMPAIRED MOBILITY  Goal: Mobility/activity is maintained at optimum level for patient  8/13/2021 1235 by Gato Álvarez RN  Outcome: Ongoing  Note: Assessed musculoskeletal functional level. Assessed ROM & ability to care for self. Problem: COMMUNICATION IMPAIRMENT  Goal: Ability to express needs and understand communication  8/13/2021 0503 by Mulugeta Sunshine RN  Outcome: Ongoing  Note: Assess communication skills & deficits. Ensure patient hearing aids are in place.

## 2021-08-14 LAB
EKG ATRIAL RATE: 76 BPM
EKG P AXIS: 53 DEGREES
EKG P-R INTERVAL: 178 MS
EKG Q-T INTERVAL: 372 MS
EKG QRS DURATION: 90 MS
EKG QTC CALCULATION (BAZETT): 418 MS
EKG R AXIS: 46 DEGREES
EKG VENTRICULAR RATE: 76 BPM

## 2021-08-14 PROCEDURE — 93010 ELECTROCARDIOGRAM REPORT: CPT | Performed by: INTERNAL MEDICINE

## 2021-08-14 NOTE — DISCHARGE SUMMARY
baseline  The patient is up in the chair  Doing well with diet  No paresis  No speech difficulties     Patient awake and orientated  Heart is regular in rate and rhythm  Lungs are clear, no rales or rhonchi /wheeze  Abdomen is soft, nontender, bowel sounds present  Extremities no edema, no tenderness  No gross motor or sensory deficits  No cyanosis      Subsequent database has included:  EKG:  Normal sinus rhythm with sinus arrhythmia   Normal ECG   No previous ECGs available      Echo:  Summary   Left ventricle is normal in size   Global left ventricular systolic function is normal   Estimated ejection fraction is 65 % . Left atrium is normal in size. No evidence of atrial septal defect. Negative bubble study, no shunt noted. No significant valvular regurgitation or stenosis seen. No pericardial effusion seen. Normal aortic root dimension.      MRI:  Impression:  Mild chronic microvascular disease without acute intracranial abnormality.      The patient was seen by Dr. Kecia Oro:  Assessment:   1. Left-sided paresthesias, UE > LE; consistent with right hemispheric TIA in the setting of right MCA, M1 segment moderate stenosis  2. Hyperlipidemia  Plan:  · 2D echo with bubble study  · Check lipid panel, hemoglobin A1c  · Aspirin 81 mg, Plavix 75 mg x 90 days for intracranial stenosis  · High intensity statin  · PT OT and speech therapies  · If above echo unremarkable, patient stable neurologically for discharge.  Recommend following up as outpatient in the next 4 to 6 weeks     The patient's condition was stabilized  Discharge planning was initiated      Discharge plan:     The patient is to resume his aspirin  Plavix added  Lipitor added  Ongoing risk factor management  Reflux precautions  The patient was instructed to follow up with their PCP,   SHAHZAD Lee CNP in one week     No discharge procedures on file.     Significant Diagnostic Studies:     Labs / Micro:  Labs:  Hematology:  Recent [employer] 08/12/21  1544   WBC 5.6   RBC 4.91   HGB 15.0   HCT 44.9   MCV 91.4   MCH 30.5   MCHC 33.4   RDW 13.6      MPV 9.1     Chemistry:  Recent Labs     08/12/21  1544 08/13/21  0448 08/13/21  0948     --   --    K 4.2  --   --      --   --    CO2 25  --   --    GLUCOSE 96  --   --    BUN 14  --   --    CREATININE 0.99  --   --    ANIONGAP 9  --   --    LABGLOM >60  --   --    GFRAA >60  --   --    CALCIUM 9.5  --   --    TROPHS 10 10 11     Recent Labs     08/13/21  0948   CHOL 202*   HDL 35*   LDLCHOLESTEROL 149*   CHOLHDLRATIO 5.8*   TRIG 91   VLDL NOT REPORTED         Radiology:    Echo Complete    Result Date: 8/13/2021  [facility] Transthoracic Echocardiography Report (TTE)  Patient Name Chitra Lomeli   Date of Study               08/13/2021               [facility]   Date of      1940  Gender                      Male  Birth   Age          68 year(s)  Race                           Room Number  1009        Height:                     73 inch, 185.42 cm   Corporate ID [id number]    Weight:                     233 pounds, 105.7 kg  #   Patient Acct [de-identified]   BSA:          2.3 m^2       BMI:      30.74  #                                                              kg/m^2   MR #         [id number]     Sonographer                 Adrien Vázquez   Accession #  [id number]  Interpreting Physician      eLsly Patiño   Fellow                   Referring Nurse                           Practitioner   Interpreting             Referring Physician         Malvin Boxer, DO  Fellow  Type of Study   TTE procedure:2D Echocardiogram, M-Mode, Doppler, Color Doppler, Bubble  Study. Procedure Date Date: 08/13/2021 Start: 02:39 PM Study Location: [address] [place] [address] Technical Quality: Adequate visualization Indications:TIA. History / Tech. Comments: Procedure explained to patient.  Patient Status: Inpatient Height: 73 inches Weight: 233.01 pounds BSA: 2.3 m^2 BMI: 30.74 kg/m^2 CONCLUSIONS Summary Left ventricle is normal in size Global left ventricular systolic function is normal Estimated ejection fraction is 65 % . Left atrium is normal in size. No evidence of atrial septal defect. Negative bubble study, no shunt noted. No significant valvular regurgitation or stenosis seen. No pericardial effusion seen. Normal aortic root dimension. Signature ----------------------------------------------------------------------------  Electronically signed by Kip Wilson) on 08/13/2021  03:05 PM ---------------------------------------------------------------------------- ----------------------------------------------------------------------------  Electronically signed by Marquise PatiñoInterpreting physician) on  08/13/2021 03:45 PM ---------------------------------------------------------------------------- FINDINGS Left Atrium Left atrium is normal in size. No evidence of atrial septal defect. Negative bubble study, no shunt noted. Left Ventricle Left ventricle is normal in size Global left ventricular systolic function is normal Estimated ejection fraction is 65 % . Right Atrium Right atrium is normal in size. Right Ventricle Normal right ventricular size and function. Mitral Valve Normal mitral valve structure and function. Trivial mitral regurgitation. Aortic Valve Normal aortic valve structure and function without stenosis or regurgitation. Tricuspid Valve Normal tricuspid valve structure and function. No tricuspid regurgitation was seen. Pulmonic Valve The pulmonic valve is normal in structure. No pulmonic insufficiency. Pericardial Effusion No pericardial effusion seen. Miscellaneous Normal aortic root dimension.  M-mode / 2D Measurements & Calculations:   LVIDd:4.6 cm(3.7 - 5.6 cm)       Diastolic BWGOZF:37.7 ml  ZVMZK:1.7 cm(2.2 - 4.0 cm)       Systolic EAHOYB:34 ml  GZQZ:4.4 cm(0.6 - 1.1 cm)        Aortic Root:3.6 cm(2.0 - 3.7 cm)  LVPWd:1.1 cm(0.6 - 1.1 cm)       LA Dimension: 3.4 cm(1.9 - 4.0 cm)  Fractional Shortenin.13 %    LA volume/Index: 30.5 ml /13m^2  Calculated LVEF (%): 77.39 %   Mitral:                                    Aortic   Valve Area (P1/2-Time): 3.49 cm^2          Peak Velocity: 1.40 m/s  Peak E-Wave: 0.58 m/s                      Peak Gradient: 7.84 mmHg  Peak A-Wave: 0.80 m/s  E/A Ratio: 0.72  Peak Gradient: 1.33 mmHg  Deceleration Time: 180 msec  P1/2t: 63 msec  Septal Wall E' velocity:0.07 m/s Lateral Wall E' velocity:0.07 m/s    XR CHEST (2 VW)    Result Date: 2021  EXAMINATION: TWO XRAY VIEWS OF THE CHEST 2021 4:39 pm COMPARISON: 2017 HISTORY: ORDERING SYSTEM PROVIDED HISTORY: Left shoulder pain (resolved) left arm paresthesia TECHNOLOGIST PROVIDED HISTORY: Left shoulder pain (resolved) left arm paresthesia Reason for Exam: Left shoulder pain (resolved) left arm paresthesia Acuity: Acute Type of Exam: Initial FINDINGS: Stable cardiomediastinal silhouette. No pulmonary venous congestion or edema. Emphysematous changes of the lungs are redemonstrated. Chronic left lower lobe atelectasis/scarring redemonstrated. No new focal lung abnormality. No pleural effusion or pneumothorax. No acute cardiopulmonary process     CT HEAD WO CONTRAST    Result Date: 2021  EXAMINATION: CT OF THE HEAD WITHOUT CONTRAST  2021 4:21 pm TECHNIQUE: CT of the head was performed without the administration of intravenous contrast. Dose modulation, iterative reconstruction, and/or weight based adjustment of the mA/kV was utilized to reduce the radiation dose to as low as reasonably achievable. COMPARISON: None.  HISTORY: ORDERING SYSTEM PROVIDED HISTORY: left upper extremity tingling and left lower extremity paresthesias TECHNOLOGIST PROVIDED HISTORY: left upper extremity tingling and left lower extremity paresthesias Decision Support Exception - unselect if not a suspected or confirmed emergency medical condition->Emergency Medical Condition (MA) Reason for Exam: chest pain Acuity: Acute Type of Exam: Initial Additional signs and symptoms: LUE numbness FINDINGS: BRAIN/VENTRICLES: There is no acute intracranial hemorrhage, mass effect or midline shift. No abnormal extra-axial fluid collection. The gray-white differentiation is maintained without evidence of an acute infarct. There is no evidence of hydrocephalus. Remote lacunar infarct left basal ganglia ORBITS: The visualized portion of the orbits demonstrate no acute abnormality. SINUSES: The visualized paranasal sinuses and mastoid air cells demonstrate no acute abnormality. SOFT TISSUES/SKULL:  No acute abnormality of the visualized skull or soft tissues. Remote lacunar infarct, left basal ganglia no acute intracranial abnormality. CT CERVICAL SPINE WO CONTRAST    Result Date: 8/12/2021  EXAMINATION: CT OF THE CERVICAL SPINE WITHOUT CONTRAST 8/12/2021 1:21 pm TECHNIQUE: CT of the cervical spine was performed without the administration of intravenous contrast. Multiplanar reformatted images are provided for review. Dose modulation, iterative reconstruction, and/or weight based adjustment of the mA/kV was utilized to reduce the radiation dose to as low as reasonably achievable. COMPARISON: None. HISTORY: ORDERING SYSTEM PROVIDED HISTORY: left upper extremity tingling TECHNOLOGIST PROVIDED HISTORY: left upper extremity tingling Decision Support Exception - unselect if not a suspected or confirmed emergency medical condition->Emergency Medical Condition (MA) Reason for Exam: neck pain Acuity: Acute Type of Exam: Initial Additional signs and symptoms: chest pain Relevant Medical/Surgical History: hx Prostate Ca FINDINGS: BONES/ALIGNMENT: No acute fracture or traumatic malalignment. DEGENERATIVE CHANGES: Multilevel degenerative disc and facet disease noted. No high-grade central canal stenosis is found. SOFT TISSUES: Prevertebral soft tissues are unremarkable for the patient's age.   Visualized lungs are carotid, anterior cerebral, or left middle cerebral arteries. No aneurysm. POSTERIOR CIRCULATION: No significant stenosis of the vertebral, basilar, or posterior cerebral arteries within limits of artifact from the patient's dentition. Mild calcification of the mid intracranial right vertebral artery. No aneurysm. OTHER: No dural venous sinus thrombosis on this non-dedicated study. BRAIN: No mass effect or midline shift. No extra-axial fluid collection. The gray-white differentiation is maintained. 1. No large vessel occlusion of the intracranial arteries. 2. 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 Otherwise, no hemodynamically significant arterial stenosis in the head or neck. MRI brain without contrast    Result Date: 8/13/2021  EXAMINATION: MRI OF THE BRAIN WITHOUT CONTRAST  8/13/2021 8:19 am TECHNIQUE: Multiplanar multisequence MRI of the brain was performed without the administration of intravenous contrast. COMPARISON: CT brain performed 08/12/2021. HISTORY: ORDERING SYSTEM PROVIDED HISTORY: CVA TECHNOLOGIST PROVIDED HISTORY: CVA Reason for Exam: Pt to ER 8/12 for numbness and tingling in L arm and L leg. Concern for TIA or CVA Acuity: Acute Type of Exam: Subsequent/Follow-up FINDINGS: INTRACRANIAL STRUCTURES/VENTRICLES: The sellar and suprasellar structures, optic chiasm, corpus callosum, pineal gland, tectum, and midline brainstem structures are unremarkable. The craniocervical junction is unremarkable. There is no acute hemorrhage, mass effect, or midline shift. There is satisfactory overall gray-white matter differentiation. There is mild chronic microvascular disease. The ventricular structures are symmetric and unremarkable. The infratentorial structures including the cerebellopontine angles and internal auditory canals are unremarkable. There is no abnormal restricted diffusion. There is no abnormal blooming artifact on susceptibility weighted imaging.  ORBITS: The visualized portion of the orbits demonstrate no acute abnormality. SINUSES: The visualized paranasal sinuses and mastoid air cells are well aerated. BONES/SOFT TISSUES: The bone marrow signal intensity appears normal. The soft tissues demonstrate no acute abnormality. Mild chronic microvascular disease without acute intracranial abnormality. Consultations:    Consults:     Final Specialist Recommendations/Findings:   IP CONSULT TO NEUROLOGY        Discharged Condition:    Stable     Disposition: Home    Physician Follow Up:    [facility]   [address]  [phone]  In 4 weeks  Hospital follow-up    Debera Hammans, APRN - CNP  [address] [facility]  [address]  [facility] [address] [facility]  [phone]    Schedule an appointment as soon as possible for a visit in 1 week         Activity:  activity as tolerated    Diet:  cardiac diet     Discharge Medications:      Medication List      START taking these medications    atorvastatin 40 MG tablet  Commonly known as: LIPITOR  Take 1 tablet by mouth nightly     clopidogrel 75 MG tablet  Commonly known as: PLAVIX  Take 1 tablet by mouth daily  Start taking on: August 14, 2021        Elan Elder taking these medications    albuterol sulfate  (90 Base) MCG/ACT inhaler  Commonly known as: ProAir HFA  Inhale 2 puffs into the lungs every 6 hours as needed for Wheezing     aspirin 81 MG tablet  Take 1 tablet by mouth daily     Paysandu 4724 Tabs     solifenacin 10 MG tablet  Commonly known as: VESICARE     tamsulosin 0.4 MG capsule  Commonly known as: FLOMAX  Take 1 capsule by mouth daily     vitamin D 25 MCG (1000 UT) Tabs tablet  Commonly known as: CHOLECALCIFEROL        STOP taking these medications    b complex vitamins capsule           Where to Get Your Medications      These medications were sent to [address] [facility], [address] [facility], [address] [facility]    Phone: [phone]   · aspirin 81 MG tablet  · atorvastatin 40 MG tablet  · clopidogrel 75 MG tablet  · tamsulosin 0.4 MG capsule         Time Spent on discharge is  20 mins in patient examination, evaluation, counseling, medication reconciliation, discharge plan and follow up. Electronically signed by   Nedra Keith DO  8/13/2021  9:15 PM      Thank you Dr. Gisselle Borja, SHAHZAD - DURAN for the opportunity to be involved in this patient's care.

## 2021-08-16 ENCOUNTER — TELEPHONE (OUTPATIENT)
Dept: PRIMARY CARE CLINIC | Age: 81
End: 2021-08-16

## 2021-08-16 NOTE — TELEPHONE ENCOUNTER
[facility] Transitions Initial Follow Up Call    Outreach made within 2 business days of discharge: Yes    Patient: Dawson Hooper Patient : 1940   MRN: [id number]  Reason for Admission: There are no discharge diagnoses documented for the most recent discharge.   Discharge Date: 21       Spoke with: left message    Discharge department/facility: [facility] Patient Contact:  Unable to ask questions, voice mail left    Scheduled appointment with PCP within 7-14 days    Follow Up  Future Appointments   Date Time Provider Juan Manuel Gray   1/10/2022 11:00 AM SHAHZAD Cloud - CNP [facility] [place], [place]

## 2021-08-17 ENCOUNTER — OFFICE VISIT (OUTPATIENT)
Dept: PRIMARY CARE CLINIC | Age: 81
End: 2021-08-17
Payer: MEDICARE

## 2021-08-17 VITALS
SYSTOLIC BLOOD PRESSURE: 118 MMHG | RESPIRATION RATE: 20 BRPM | BODY MASS INDEX: 30.87 KG/M2 | DIASTOLIC BLOOD PRESSURE: 70 MMHG | OXYGEN SATURATION: 98 % | WEIGHT: 234 LBS | HEART RATE: 68 BPM

## 2021-08-17 DIAGNOSIS — G45.9 TIA (TRANSIENT ISCHEMIC ATTACK): Primary | ICD-10-CM

## 2021-08-17 DIAGNOSIS — E78.5 DYSLIPIDEMIA: ICD-10-CM

## 2021-08-17 PROCEDURE — 99496 TRANSJ CARE MGMT HIGH F2F 7D: CPT | Performed by: NURSE PRACTITIONER

## 2021-08-17 PROCEDURE — 1111F DSCHRG MED/CURRENT MED MERGE: CPT | Performed by: NURSE PRACTITIONER

## 2021-08-17 ASSESSMENT — ENCOUNTER SYMPTOMS
ABDOMINAL PAIN: 0
DIARRHEA: 0
SORE THROAT: 0
WHEEZING: 0
CONSTIPATION: 0
SHORTNESS OF BREATH: 0
BLOOD IN STOOL: 0
NAUSEA: 0
TROUBLE SWALLOWING: 0
VOMITING: 0
SINUS PRESSURE: 0
COUGH: 0

## 2021-08-17 NOTE — PROGRESS NOTES
Post-Discharge Transitional Care Management Services or Hospital Follow Up      Ewa Prakash   YOB: 1940    Date of Office Visit:  8/17/2021  Date of Hospital Admission: 8/13/21  Date of Hospital Discharge: 8/13/21  Readmission Risk Score(high >=14%.  Medium >=10%):Readmission Risk Score: 9      Care management risk score Rising risk (score 2-5) and Complex Care (Scores >=6): 1     Non face to face  following discharge, date last encounter closed (first attempt may have been earlier): 8/16/2021  1:34 PM 8/16/2021  1:34 PM    Call initiated 2 business days of discharge: Yes     Patient Active Problem List   Diagnosis    Dyslipidemia    Gastroesophageal reflux disease    Family history of colon cancer    Diverticulosis    Gastritis    Diverticulosis of intestine without bleeding    Gastritis without bleeding    Dysphagia    Environmental allergies    History of prostate cancer    TIA (transient ischemic attack)    Paresthesia    Class 1 obesity due to excess calories in adult       No Known Allergies    Medications listed as ordered at the time of discharge from hospital   James CONLEY   Home Medication Instructions SHAYNA:    Printed on:08/17/21 1803   Medication Information                      albuterol sulfate HFA (PROAIR HFA) 108 (90 BASE) MCG/ACT inhaler  Inhale 2 puffs into the lungs every 6 hours as needed for Wheezing             aspirin 81 MG tablet  Take 1 tablet by mouth daily             atorvastatin (LIPITOR) 40 MG tablet  Take 1 tablet by mouth nightly             clopidogrel (PLAVIX) 75 MG tablet  Take 1 tablet by mouth daily             Glucos-Chond-Hyal Ac-Ca Fructo (MOVE FREE JOINT HEALTH ADVANCE) TABS  Take 1 tablet by mouth daily              solifenacin (VESICARE) 10 MG tablet  Take 10 mg by mouth daily                    Medications marked \"taking\" at this time  Outpatient Medications Marked as Taking for the 8/17/21 encounter (Office Visit) with Roger Frankel Cody Carey, APRN - CNP   Medication Sig Dispense Refill    aspirin 81 MG tablet Take 1 tablet by mouth daily 30 tablet 3    atorvastatin (LIPITOR) 40 MG tablet Take 1 tablet by mouth nightly 30 tablet 3    clopidogrel (PLAVIX) 75 MG tablet Take 1 tablet by mouth daily 30 tablet 3    solifenacin (VESICARE) 10 MG tablet Take 10 mg by mouth daily       albuterol sulfate HFA (PROAIR HFA) 108 (90 BASE) MCG/ACT inhaler Inhale 2 puffs into the lungs every 6 hours as needed for Wheezing 1 Inhaler 5    Glucos-Chond-Hyal Ac-Ca Fructo (MOVE FREE JOINT HEALTH ADVANCE) TABS Take 1 tablet by mouth daily           Medications patient taking as of now reconciled against medications ordered at time of hospital discharge: Yes    Chief Complaint   Patient presents with    Follow-Up from [facility]     Discharged from [facility] on 8/13/21; Discuss Medication and lab results       Here today for follow up after overnight stay for left sided numbness  States he woke up and felt like as though his left side \"was different and not right\"  Denies any specific weakness, no facial drooping  As the feeling lasted several hours with no improvement, he decided to go to ED as has strong family hx of CVA  Reports was told possible TIA and started on plavix and statin  He has several questions about need for the medications, lab results  He will be following up with neurology but has not yet scheduled appointment      Inpatient course: Discharge summary reviewed- see chart. Interval history/Current status: improved    Review of Systems   Constitutional: Negative for activity change, appetite change, chills, fatigue, fever and unexpected weight change. HENT: Negative for congestion, ear pain, hearing loss, sinus pressure, sore throat and trouble swallowing. Eyes: Negative for visual disturbance. Respiratory: Negative for cough, shortness of breath and wheezing. Cardiovascular: Negative for chest pain, palpitations and leg swelling. Gastrointestinal: Negative for abdominal pain, blood in stool, constipation, diarrhea, nausea and vomiting. Endocrine: Negative for cold intolerance, heat intolerance, polydipsia, polyphagia and polyuria. Genitourinary: Negative for difficulty urinating, frequency, hematuria and urgency. Musculoskeletal: Negative for arthralgias and myalgias. Skin: Negative for rash. Allergic/Immunologic: Negative for environmental allergies. Neurological: Negative for dizziness, weakness, light-headedness and headaches. Psychiatric/Behavioral: Negative for confusion. The patient is not nervous/anxious. Vitals:    08/17/21 1439   BP: 118/70   Pulse: 68   Resp: 20   SpO2: 98%   Weight: 234 lb (106.1 kg)     Body mass index is 30.87 kg/m². Wt Readings from Last 3 Encounters:   08/17/21 234 lb (106.1 kg)   08/13/21 233 lb 7.5 oz (105.9 kg)   08/12/21 235 lb (106.6 kg)     BP Readings from Last 3 Encounters:   08/17/21 118/70   08/13/21 119/82   08/13/21 (!) 155/69       Physical Exam  Constitutional:       Appearance: He is well-developed. HENT:      Head: Normocephalic. Eyes:      Conjunctiva/sclera: Conjunctivae normal.      Pupils: Pupils are equal, round, and reactive to light. Cardiovascular:      Rate and Rhythm: Normal rate and regular rhythm. Heart sounds: Normal heart sounds. No murmur heard. Pulmonary:      Effort: Pulmonary effort is normal.      Breath sounds: Normal breath sounds. No wheezing. Abdominal:      General: Bowel sounds are normal. There is no distension. Palpations: Abdomen is soft. Musculoskeletal:         General: Normal range of motion. Cervical back: Normal range of motion. Skin:     General: Skin is warm and dry. Neurological:      Mental Status: He is alert and oriented to person, place, and time. Psychiatric:         Behavior: Behavior normal.         Thought Content:  Thought content normal.         Judgment: Judgment normal. Assessment/Plan:  1. TIA (transient ischemic attack)  - MD DISCHARGE MEDS RECONCILED W/ CURRENT OUTPATIENT MED LIST  Reviewed notes, labs and diagnostics from hospital stay. At this time he is asymptomatic. He will continue continue Plavix, baby aspirin and statin and will follow up with neurologist as planned. He had multiple questions/concerns which were all answered/addressed during his appointment time    2. Dyslipidemia    Reviewed importance of continuing statin benefit to prevent further vascular events. He was initially resistant as he had been off of the statin for a few years now.   After some discussion he is agreeable to continue    Medical Decision Making: high complexity

## 2021-08-23 NOTE — PROGRESS NOTES
Physician Progress Note      PATIENT:               Antwon Media  CSN #:                  [id number]  :                       1940  ADMIT DATE:       2021 3:41 AM  DISCH DATE:        2021 7:04 PM  RESPONDING  PROVIDER #:        Cramen MO DO          QUERY TEXT:    Pt admitted with paresthesia. Pt noted to have moderate stenosis of the right   MCA . If possible, please document in progress notes and discharge summary if   you were evaluating and /or treating any of the following: The medical record reflects the following:  Risk Factors: TIA, Right MCA , M1 segment moderate stenosis  Clinical Indicators: Discharge summary states Left-sided paresthesia, UE > LE;   consistent with right hemispheric TIA  in the setting of right MCA, M1 segment moderate stenosis. Treatment: CTA head and neck, neurology consulted ,  Aspirin 81 mg, Plavix 75   mg x 90 days for intracranial stenosis  Options provided:  -- TIA symptoms due to Cerebral Artery Occlusion/Stenosis (without Infarction)  -- TIA symptoms not due to Cerebral Artery Occlusion/Stenosis (without   Infarction)  -- Other - I will add my own diagnosis  -- Disagree - Not applicable / Not valid  -- Disagree - Clinically unable to determine / Unknown  -- Refer to Clinical Documentation Reviewer    PROVIDER RESPONSE TEXT:    TIA symptoms are due to cerebral artery occlusion/stenosis (without   Infarction).     Query created by: Melina Gongora on 2021 9:24 AM      Electronically signed by:  Nany Pineda DO 2021 7:50 PM

## 2021-12-16 ENCOUNTER — TELEPHONE (OUTPATIENT)
Dept: PRIMARY CARE CLINIC | Age: 81
End: 2021-12-16

## 2021-12-16 DIAGNOSIS — G45.9 TIA (TRANSIENT ISCHEMIC ATTACK): ICD-10-CM

## 2021-12-16 DIAGNOSIS — E78.5 DYSLIPIDEMIA: Primary | ICD-10-CM

## 2021-12-16 RX ORDER — CLOPIDOGREL BISULFATE 75 MG/1
75 TABLET ORAL DAILY
Qty: 30 TABLET | Refills: 3 | Status: SHIPPED | OUTPATIENT
Start: 2021-12-16 | End: 2022-04-15

## 2021-12-16 RX ORDER — ATORVASTATIN CALCIUM 40 MG/1
40 TABLET, FILM COATED ORAL NIGHTLY
Qty: 30 TABLET | Refills: 3 | Status: SHIPPED | OUTPATIENT
Start: 2021-12-16 | End: 2022-04-15

## 2021-12-16 NOTE — TELEPHONE ENCOUNTER
----- Message from [place], [place] sent at 12/16/2021  2:22 PM EST -----  Subject: Refill Request    QUESTIONS  Name of Medication? atorvastatin (LIPITOR) 40 MG tablet  Patient-reported dosage and instructions? Take 1 tab nightly  How many days do you have left? 0  Preferred Pharmacy? [facility]  Pharmacy phone number (if available)? [phone]  ---------------------------------------------------------------------------  --------------,  Name of Medication? clopidogrel (PLAVIX) 75 MG tablet  Patient-reported dosage and instructions? Take 1 tab daily  How many days do you have left? 0  Preferred Pharmacy? [facility]  Pharmacy phone number (if available)? [phone]  Additional Information for Provider? Please refill the RX's   ---------------------------------------------------------------------------  --------------  CALL BACK INFO  What is the best way for the office to contact you? OK to leave message on   voicemail  Preferred Call Back Phone Number?  [phone]

## 2022-01-10 ENCOUNTER — HOSPITAL ENCOUNTER (OUTPATIENT)
Age: 82
Setting detail: SPECIMEN
Discharge: HOME | End: 2022-01-10

## 2022-01-10 ENCOUNTER — OFFICE VISIT (OUTPATIENT)
Dept: PRIMARY CARE CLINIC | Age: 82
End: 2022-01-10
Payer: MEDICARE

## 2022-01-10 VITALS
RESPIRATION RATE: 16 BRPM | WEIGHT: 238 LBS | OXYGEN SATURATION: 95 % | BODY MASS INDEX: 31.4 KG/M2 | DIASTOLIC BLOOD PRESSURE: 70 MMHG | SYSTOLIC BLOOD PRESSURE: 110 MMHG | HEART RATE: 74 BPM

## 2022-01-10 DIAGNOSIS — E78.5 DYSLIPIDEMIA: Primary | ICD-10-CM

## 2022-01-10 DIAGNOSIS — Z85.46 HISTORY OF PROSTATE CANCER: ICD-10-CM

## 2022-01-10 DIAGNOSIS — G45.9 TIA (TRANSIENT ISCHEMIC ATTACK): ICD-10-CM

## 2022-01-10 DIAGNOSIS — Z12.5 SCREENING FOR PROSTATE CANCER: ICD-10-CM

## 2022-01-10 LAB — PROSTATE SPECIFIC ANTIGEN: <0.02 UG/L

## 2022-01-10 PROCEDURE — 1123F ACP DISCUSS/DSCN MKR DOCD: CPT | Performed by: NURSE PRACTITIONER

## 2022-01-10 PROCEDURE — G8482 FLU IMMUNIZE ORDER/ADMIN: HCPCS | Performed by: NURSE PRACTITIONER

## 2022-01-10 PROCEDURE — 1036F TOBACCO NON-USER: CPT | Performed by: NURSE PRACTITIONER

## 2022-01-10 PROCEDURE — G8427 DOCREV CUR MEDS BY ELIG CLIN: HCPCS | Performed by: NURSE PRACTITIONER

## 2022-01-10 PROCEDURE — 99213 OFFICE O/P EST LOW 20 MIN: CPT | Performed by: NURSE PRACTITIONER

## 2022-01-10 PROCEDURE — G8417 CALC BMI ABV UP PARAM F/U: HCPCS | Performed by: NURSE PRACTITIONER

## 2022-01-10 PROCEDURE — 4040F PNEUMOC VAC/ADMIN/RCVD: CPT | Performed by: NURSE PRACTITIONER

## 2022-01-10 SDOH — ECONOMIC STABILITY: FOOD INSECURITY: WITHIN THE PAST 12 MONTHS, YOU WORRIED THAT YOUR FOOD WOULD RUN OUT BEFORE YOU GOT MONEY TO BUY MORE.: NEVER TRUE

## 2022-01-10 SDOH — ECONOMIC STABILITY: FOOD INSECURITY: WITHIN THE PAST 12 MONTHS, THE FOOD YOU BOUGHT JUST DIDN'T LAST AND YOU DIDN'T HAVE MONEY TO GET MORE.: NEVER TRUE

## 2022-01-10 ASSESSMENT — ENCOUNTER SYMPTOMS
TROUBLE SWALLOWING: 0
BLOOD IN STOOL: 0
DIARRHEA: 0
WHEEZING: 0
NAUSEA: 0
VOMITING: 0
SORE THROAT: 0
CONSTIPATION: 0
SINUS PRESSURE: 0
ABDOMINAL PAIN: 0
COUGH: 0

## 2022-01-10 ASSESSMENT — PATIENT HEALTH QUESTIONNAIRE - PHQ9
1. LITTLE INTEREST OR PLEASURE IN DOING THINGS: 0
SUM OF ALL RESPONSES TO PHQ9 QUESTIONS 1 & 2: 0
SUM OF ALL RESPONSES TO PHQ QUESTIONS 1-9: 0
2. FEELING DOWN, DEPRESSED OR HOPELESS: 0

## 2022-01-10 ASSESSMENT — SOCIAL DETERMINANTS OF HEALTH (SDOH): HOW HARD IS IT FOR YOU TO PAY FOR THE VERY BASICS LIKE FOOD, HOUSING, MEDICAL CARE, AND HEATING?: NOT HARD AT ALL

## 2022-01-10 NOTE — PROGRESS NOTES
[facility] PRIMARY CARE  [address]   [address]  [address] [address]  Dept: [phone]  Dept Fax: [phone]    Joe Diana is a 80 y.o. male who presentstoday for his medical conditions/complaints as noted below.   Joe Diana is c/o of  Chief Complaint   Patient presents with    Follow-up       HPI:     Here today for follow up  Reports has upcoming visit with urology, would like PSA checked today    Reports he stopped his baby aspirin after starting on Plavix  He states he was noticing a significant amount of bruising and also some upset stomach  Since stopping the aspirin and the symptoms have resolved    Denies any additional concerns today      Hemoglobin A1C (%)   Date Value   12/29/2020 5.5             ( goal A1C is < 7)   No results found for: LABMICR  LDL Cholesterol (mg/dL)   Date Value   08/13/2021 149 (H)   07/12/2021 146 (H)     LDL Calculated (mg/dL)   Date Value   10/31/2017 153   09/28/2016 101       (goal LDL is <100)   AST (U/L)   Date Value   07/12/2021 16     ALT (U/L)   Date Value   07/12/2021 12     BUN (mg/dL)   Date Value   08/12/2021 14     BP Readings from Last 3 Encounters:   01/10/22 110/70   08/17/21 118/70   08/13/21 119/82          ([id number])    Past Medical History:   Diagnosis Date    COPD (chronic obstructive pulmonary disease) ([address]     Diverticulosis     Gastritis     GERD (gastroesophageal reflux disease)     History of thoracentesis 2005    left    Hyperlipidemia     Inguinal hernia 08/03/2020    right    Multiple lung nodules 2005    left    Prostate cancer ([address]     Seasonal allergies       Past Surgical History:   Procedure Laterality Date    CARDIAC CATHETERIZATION  2005    no stents reported    CATARACT REMOVAL WITH IMPLANT Bilateral     COLONOSCOPY      done about 10 years ago    COLONOSCOPY  11/10/2016    severe diverticulosis     CYST REMOVAL      groin, left arm    ENDOSCOPY, COLON, DIAGNOSTIC      HEMORRHOID SURGERY      HERNIA REPAIR Right 8/3/2020    HERNIA INGUINAL REPAIR LAPAROSCOPIC ROBOTIC performed by Yfn Saleh MD at [address] [place] [address] Right 2020    Robotic Lap. Rt Inguinal Hernia Repair with Mesh    LUNG BIOPSY  2005    left side    OTHER SURGICAL HISTORY      prostate seeding    UPPER GASTROINTESTINAL ENDOSCOPY      done about 10 years ago    UPPER GASTROINTESTINAL ENDOSCOPY  11/10/2016    duodenal bulb polyp, MODERATE GASTRITIS        Family History   Problem Relation Age of Onset    Cancer Mother     Cancer Father         colon          Social History     Tobacco Use    Smoking status: Former Smoker     Packs/day: 1.00     Years: 20.00     Pack years: 20.00     Quit date:      Years since quittin.0    Smokeless tobacco: Never Used    Tobacco comment: stopped smoking 20 years   Substance Use Topics    Alcohol use: Yes     Comment: occ      Current Outpatient Medications   Medication Sig Dispense Refill    clopidogrel (PLAVIX) 75 MG tablet Take 1 tablet by mouth daily 30 tablet 3    atorvastatin (LIPITOR) 40 MG tablet Take 1 tablet by mouth nightly 30 tablet 3    solifenacin (VESICARE) 10 MG tablet Take 10 mg by mouth daily       albuterol sulfate HFA (PROAIR HFA) 108 (90 BASE) MCG/ACT inhaler Inhale 2 puffs into the lungs every 6 hours as needed for Wheezing 1 Inhaler 5    Glucos-Chond-Hyal Ac-Ca Fructo (MOVE FREE JOINT HEALTH ADVANCE) TABS Take 1 tablet by mouth daily        No current facility-administered medications for this visit.      No Known Allergies    Health Maintenance   Topic Date Due    Shingles Vaccine (2 of 3) 2012    Annual Wellness Visit (AWV)  Never done    Depression Screen  2022    PSA counseling  2022    Lipid screen  2022    DTaP/Tdap/Td vaccine (2 - Td or Tdap) 2028    Flu vaccine  Completed    Pneumococcal 65+ years Vaccine  Completed    COVID-19 Vaccine Completed    Hepatitis A vaccine  Aged Out    Hepatitis B vaccine  Aged Out    Hib vaccine  Aged Out    Meningococcal (ACWY) vaccine  Aged Out    Hepatitis C screen  Discontinued       Subjective:      Review of Systems   Constitutional: Negative for activity change, appetite change, chills, fatigue, fever and unexpected weight change. HENT: Negative for congestion, ear pain, hearing loss, sinus pressure, sore throat and trouble swallowing. Eyes: Negative for visual disturbance. Respiratory: Negative for cough and wheezing. Cardiovascular: Negative for palpitations and leg swelling. Gastrointestinal: Negative for abdominal pain, blood in stool, constipation, diarrhea, nausea and vomiting. Endocrine: Negative for cold intolerance, heat intolerance, polydipsia, polyphagia and polyuria. Genitourinary: Negative for difficulty urinating, frequency, hematuria and urgency. Musculoskeletal: Negative for arthralgias. Skin: Negative for rash. Allergic/Immunologic: Negative for environmental allergies. Neurological: Negative for dizziness, weakness, light-headedness and headaches. Psychiatric/Behavioral: Negative for confusion. The patient is not nervous/anxious. Objective:     Physical Exam  Constitutional:       Appearance: He is well-developed. HENT:      Head: Normocephalic. Eyes:      Conjunctiva/sclera: Conjunctivae normal.      Pupils: Pupils are equal, round, and reactive to light. Cardiovascular:      Rate and Rhythm: Normal rate and regular rhythm. Heart sounds: Normal heart sounds. No murmur heard. Pulmonary:      Effort: Pulmonary effort is normal.      Breath sounds: Normal breath sounds. No wheezing. Abdominal:      General: Bowel sounds are normal. There is no distension. Palpations: Abdomen is soft. Musculoskeletal:         General: Normal range of motion. Cervical back: Normal range of motion. Skin:     General: Skin is warm and dry. Neurological:      Mental Status: He is alert and oriented to person, place, and time. Psychiatric:         Behavior: Behavior normal.         Thought Content: Thought content normal.         Judgment: Judgment normal.       /70   Pulse 74   Resp 16   Wt 238 lb (108 kg)   SpO2 95%   BMI 31.40 kg/m²     Assessment:       Diagnosis Orders   1. Dyslipidemia     2. TIA (transient ischemic attack)     3. History of prostate cancer     4. Screening for prostate cancer  PSA screening             Plan:      Return in about 6 months (around 7/10/2022) for dyslipidemia. Dyslipidemia well controlled with statin, will be due for lipid panel next visit  Chalo Castillo has been stable Plavix, no recurrence  History of prostate cancerhas been stable with no recurrence, he has upcoming appointment with urologist  Orders Placed This Encounter   Procedures    PSA screening     Please send results to [place] [address] [facility] Marguerite Amezquita, fax [phone]     Standing Status:   Future     Number of Occurrences:   1     Standing Expiration Date:   1/10/2023          Patient given educational materials - see patient instructions. Discussed use, benefit, and side effects of prescribed medications. All patientquestions answered. Pt voiced understanding. Reviewed health maintenance. Instructedto continue current medications, diet and exercise. Patient agreed with treatmentplan. Follow up as directed.      Electronicallysigned by SHAHZAD Franks CNP on 1/10/2022 at 12:31 PM

## 2022-04-15 DIAGNOSIS — G45.9 TIA (TRANSIENT ISCHEMIC ATTACK): ICD-10-CM

## 2022-04-15 DIAGNOSIS — E78.5 DYSLIPIDEMIA: ICD-10-CM

## 2022-04-15 RX ORDER — CLOPIDOGREL BISULFATE 75 MG/1
TABLET ORAL
Qty: 90 TABLET | Refills: 3 | Status: SHIPPED | OUTPATIENT
Start: 2022-04-15

## 2022-04-15 RX ORDER — ATORVASTATIN CALCIUM 40 MG/1
TABLET, FILM COATED ORAL
Qty: 90 TABLET | Refills: 3 | Status: SHIPPED | OUTPATIENT
Start: 2022-04-15

## 2022-07-11 ENCOUNTER — TELEPHONE (OUTPATIENT)
Dept: PRIMARY CARE CLINIC | Age: 82
End: 2022-07-11

## 2022-07-11 ENCOUNTER — OFFICE VISIT (OUTPATIENT)
Dept: PRIMARY CARE CLINIC | Age: 82
End: 2022-07-11
Payer: MEDICARE

## 2022-07-11 VITALS
DIASTOLIC BLOOD PRESSURE: 78 MMHG | RESPIRATION RATE: 16 BRPM | BODY MASS INDEX: 31.91 KG/M2 | WEIGHT: 240.8 LBS | SYSTOLIC BLOOD PRESSURE: 110 MMHG | HEART RATE: 59 BPM | HEIGHT: 73 IN | OXYGEN SATURATION: 97 %

## 2022-07-11 DIAGNOSIS — L29.9 ITCHING OF EAR: ICD-10-CM

## 2022-07-11 DIAGNOSIS — M25.475 BILATERAL SWELLING OF FEET AND ANKLES: ICD-10-CM

## 2022-07-11 DIAGNOSIS — M25.472 BILATERAL SWELLING OF FEET AND ANKLES: ICD-10-CM

## 2022-07-11 DIAGNOSIS — Z12.5 SCREENING FOR MALIGNANT NEOPLASM OF PROSTATE: ICD-10-CM

## 2022-07-11 DIAGNOSIS — M25.474 BILATERAL SWELLING OF FEET AND ANKLES: ICD-10-CM

## 2022-07-11 DIAGNOSIS — Z00.00 INITIAL MEDICARE ANNUAL WELLNESS VISIT: Primary | ICD-10-CM

## 2022-07-11 DIAGNOSIS — M25.471 BILATERAL SWELLING OF FEET AND ANKLES: ICD-10-CM

## 2022-07-11 DIAGNOSIS — H33.21 RIGHT RETINAL DETACHMENT: ICD-10-CM

## 2022-07-11 DIAGNOSIS — Z85.46 HISTORY OF PROSTATE CANCER: ICD-10-CM

## 2022-07-11 DIAGNOSIS — E78.5 DYSLIPIDEMIA: ICD-10-CM

## 2022-07-11 DIAGNOSIS — G45.9 TIA (TRANSIENT ISCHEMIC ATTACK): ICD-10-CM

## 2022-07-11 PROCEDURE — 1123F ACP DISCUSS/DSCN MKR DOCD: CPT | Performed by: NURSE PRACTITIONER

## 2022-07-11 PROCEDURE — G0438 PPPS, INITIAL VISIT: HCPCS | Performed by: NURSE PRACTITIONER

## 2022-07-11 RX ORDER — FUROSEMIDE 20 MG/1
20 TABLET ORAL DAILY
Qty: 5 TABLET | Refills: 0 | Status: SHIPPED | OUTPATIENT
Start: 2022-07-11

## 2022-07-11 RX ORDER — TRIAMCINOLONE ACETONIDE 5 MG/G
CREAM TOPICAL
Qty: 15 G | Refills: 1 | Status: SHIPPED | OUTPATIENT
Start: 2022-07-11

## 2022-07-11 RX ORDER — TRIAMCINOLONE ACETONIDE 5 MG/G
CREAM TOPICAL
Qty: 15 G | Refills: 0 | Status: SHIPPED | OUTPATIENT
Start: 2022-07-11 | End: 2022-07-11 | Stop reason: SDUPTHER

## 2022-07-11 ASSESSMENT — PATIENT HEALTH QUESTIONNAIRE - PHQ9
SUM OF ALL RESPONSES TO PHQ9 QUESTIONS 1 & 2: 1
SUM OF ALL RESPONSES TO PHQ QUESTIONS 1-9: 1
2. FEELING DOWN, DEPRESSED OR HOPELESS: 1
SUM OF ALL RESPONSES TO PHQ QUESTIONS 1-9: 1
1. LITTLE INTEREST OR PLEASURE IN DOING THINGS: 0

## 2022-07-11 NOTE — TELEPHONE ENCOUNTER
Susan calling from [facility] ([phone]), needs to know the supply for the triamcinolone cream on how long the tube should last:  so like until sx are gone,  Use for 3 weeks etc.    Please advise

## 2022-07-11 NOTE — PATIENT INSTRUCTIONS
Personalized Preventive Plan for [place] - 7/11/2022  Medicare offers a range of preventive health benefits. Some of the tests and screenings are paid in full while other may be subject to a deductible, co-insurance, and/or copay. Some of these benefits include a comprehensive review of your medical history including lifestyle, illnesses that may run in your family, and various assessments and screenings as appropriate. After reviewing your medical record and screening and assessments performed today your provider may have ordered immunizations, labs, imaging, and/or referrals for you. A list of these orders (if applicable) as well as your Preventive Care list are included within your After Visit Summary for your review. Other Preventive Recommendations:    · A preventive eye exam performed by an eye specialist is recommended every 1-2 years to screen for glaucoma; cataracts, macular degeneration, and other eye disorders. · A preventive dental visit is recommended every 6 months. · Try to get at least 150 minutes of exercise per week or 10,000 steps per day on a pedometer . · Order or download the FREE \"Exercise & Physical Activity: Your Everyday Guide\" from [employer]. Call 6-588.632.6868 or search [employer] online. · You need 0388-6075 mg of calcium and 7459-2076 IU of vitamin D per day. It is possible to meet your calcium requirement with diet alone, but a vitamin D supplement is usually necessary to meet this goal.  · When exposed to the sun, use a sunscreen that protects against both UVA and UVB radiation with an SPF of 30 or greater. Reapply every 2 to 3 hours or after sweating, drying off with a towel, or swimming. · Always wear a seat belt when traveling in a car. Always wear a helmet when riding a bicycle or motorcycle.

## 2022-07-11 NOTE — PROGRESS NOTES
Medicare Annual Wellness Visit    Sarah Ellis is here for Medicare AWV (noticing irritation with use of hearing aids, noitcing build up in ears due to hearing aids; has burn on lower left leg; swelling of lower legs in to feet bilateral)    Assessment & Plan   Initial Medicare annual wellness visit  TIA (transient ischemic attack)  -     CBC with Auto Differential; Future  Dyslipidemia  -     Comprehensive Metabolic Panel; Future  -     Lipid Panel; Future  History of prostate cancer-has been stable with no recurrence, continue follow-up with urologist as planned  Right retinal detachment-continue regular follow-ups with ophthalmology/retinal specialist.  He is making some improvement in his vision  Screening for malignant neoplasm of prostate  -     PSA Screening; Future  Itching of ear  -     triamcinolone (ARISTOCORT) 0.5 % cream; Apply topically  times daily as needed to ear canals. , Disp-15 g, R-0, Normal  Bilateral swelling of feet and ankles-advised purchase of compression knee highs, elevate feet while sitting. Re-eval in 4 weeks. Will also check labs  -     furosemide (LASIX) 20 MG tablet; Take 1 tablet by mouth daily, Disp-5 tablet, R-0Normal      Recommendations for Preventive Services Due: see orders and patient instructions/AVS.  Recommended screening schedule for the next 5-10 years is provided to the patient in written form: see Patient Instructions/AVS.     Return in 4 weeks (on 8/8/2022) for leg swelling, AWV in 1 year. Subjective   Presents today with bilateral swelling in ankles and feet reports has been present the past month  Denies any SOB or fatigue, no change in activity tolerance    Reports recent retinal detachment after a fall from chair and hit his head. Vision slowly improving but still compromised    Patient's complete Health Risk Assessment and screening values have been reviewed and are found in Flowsheets.  The following problems were reviewed today and where indicated follow up appointments were made and/or referrals ordered.     Positive Risk Factor Screenings with Interventions:    Fall Risk:  Do you feel unsteady or are you worried about falling? : no  2 or more falls in past year?: (!) yes  Fall with injury in past year?: (!) yes (Rentina detachement 1 month ago with fall)     Fall Risk Interventions:    · Home safety tips provided   · Depth perception off due to retinal detachment             Health Habits/Nutrition:     Physical Activity: Insufficiently Active    Days of Exercise per Week: 3 days    Minutes of Exercise per Session: 30 min     Have you lost any weight without trying in the past 3 months?: No  Body mass index: (!) 31.77  Have you seen the dentist within the past year?: Yes    Health Habits/Nutrition Interventions:  · no concerns, keeping active around his home    Hearing/Vision:  Do you or your family notice any trouble with your hearing that hasn't been managed with hearing aids?: No  Do you have difficulty driving, watching TV, or doing any of your daily activities because of your eyesight?: (!) Yes  Have you had an eye exam within the past year?: Yes  No exam data present    Hearing/Vision Interventions:  · Vision concerns:  has been seeing retinal specialist for recent retinal dteachment    Safety:  Do you have working smoke detectors?: (!) No  Do you have any tripping hazards - loose or unsecured carpets or rugs?: No  Do you have any tripping hazards - clutter in doorways, halls, or stairs?: No  Do you have either shower bars, grab bars, non-slip mats or non-slip surfaces in your shower or bathtub?: Yes  Do all of your stairways have a railing or banister?: Yes  Do you always fasten your seatbelt when you are in a car?: Yes    Safety Interventions:  · Home safety tips provided           Objective   Vitals:    07/11/22 0941   BP: 110/78   Site: Left Upper Arm   Position: Sitting   Cuff Size: Large Adult   Pulse: 59   Resp: 16   SpO2: 97%   Weight: 240 lb

## 2022-07-11 NOTE — TELEPHONE ENCOUNTER
[facility] Aristocort needs how many times a day to apply and days supply (how long 15 g) would last.  Can either e-scribe again or office can call pharmacy back with info. Please advise.

## 2022-07-12 ENCOUNTER — HOSPITAL ENCOUNTER (OUTPATIENT)
Age: 82
Setting detail: SPECIMEN
Discharge: HOME | End: 2022-07-12

## 2022-07-12 DIAGNOSIS — G45.9 TIA (TRANSIENT ISCHEMIC ATTACK): ICD-10-CM

## 2022-07-12 DIAGNOSIS — Z12.5 SCREENING FOR MALIGNANT NEOPLASM OF PROSTATE: ICD-10-CM

## 2022-07-12 DIAGNOSIS — E78.5 DYSLIPIDEMIA: ICD-10-CM

## 2022-07-12 LAB
ABSOLUTE EOS #: 0.21 K/UL (ref 0–0.44)
ABSOLUTE IMMATURE GRANULOCYTE: 0.03 K/UL (ref 0–0.3)
ABSOLUTE LYMPH #: 1.12 K/UL (ref 1.1–3.7)
ABSOLUTE MONO #: 0.5 K/UL (ref 0.1–1.2)
ALBUMIN SERPL-MCNC: 4.3 G/DL (ref 3.5–5.2)
ALBUMIN/GLOBULIN RATIO: 1.8 (ref 1–2.5)
ALP BLD-CCNC: 88 U/L (ref 40–129)
ALT SERPL-CCNC: 13 U/L (ref 5–41)
ANION GAP SERPL CALCULATED.3IONS-SCNC: 17 MMOL/L (ref 9–17)
AST SERPL-CCNC: 16 U/L
BASOPHILS # BLD: 1 % (ref 0–2)
BASOPHILS ABSOLUTE: 0.04 K/UL (ref 0–0.2)
BILIRUB SERPL-MCNC: 1.04 MG/DL (ref 0.3–1.2)
BUN BLDV-MCNC: 13 MG/DL (ref 8–23)
CALCIUM SERPL-MCNC: 9.8 MG/DL (ref 8.6–10.4)
CHLORIDE BLD-SCNC: 103 MMOL/L (ref 98–107)
CHOLESTEROL/HDL RATIO: 3.6
CHOLESTEROL: 129 MG/DL
CO2: 23 MMOL/L (ref 20–31)
CREAT SERPL-MCNC: 1.1 MG/DL (ref 0.7–1.2)
EOSINOPHILS RELATIVE PERCENT: 4 % (ref 1–4)
GFR AFRICAN AMERICAN: >60 ML/MIN
GFR NON-AFRICAN AMERICAN: >60 ML/MIN
GFR SERPL CREATININE-BSD FRML MDRD: NORMAL ML/MIN/{1.73_M2}
GLUCOSE BLD-MCNC: 87 MG/DL (ref 70–99)
HCT VFR BLD CALC: 43.8 % (ref 40.7–50.3)
HDLC SERPL-MCNC: 36 MG/DL
HEMOGLOBIN: 14.4 G/DL (ref 13–17)
IMMATURE GRANULOCYTES: 1 %
LDL CHOLESTEROL: 78 MG/DL (ref 0–130)
LYMPHOCYTES # BLD: 21 % (ref 24–43)
MCH RBC QN AUTO: 30.6 PG (ref 25.2–33.5)
MCHC RBC AUTO-ENTMCNC: 32.9 G/DL (ref 28.4–34.8)
MCV RBC AUTO: 93.2 FL (ref 82.6–102.9)
MONOCYTES # BLD: 9 % (ref 3–12)
NRBC AUTOMATED: 0 PER 100 WBC
PDW BLD-RTO: 12.9 % (ref 11.8–14.4)
PLATELET # BLD: 173 K/UL (ref 138–453)
PMV BLD AUTO: 11.6 FL (ref 8.1–13.5)
POTASSIUM SERPL-SCNC: 4.5 MMOL/L (ref 3.7–5.3)
PROSTATE SPECIFIC ANTIGEN: <0.02 NG/ML
RBC # BLD: 4.7 M/UL (ref 4.21–5.77)
SEG NEUTROPHILS: 64 % (ref 36–65)
SEGMENTED NEUTROPHILS ABSOLUTE COUNT: 3.5 K/UL (ref 1.5–8.1)
SODIUM BLD-SCNC: 143 MMOL/L (ref 135–144)
TOTAL PROTEIN: 6.7 G/DL (ref 6.4–8.3)
TRIGL SERPL-MCNC: 77 MG/DL
WBC # BLD: 5.4 K/UL (ref 3.5–11.3)

## 2022-07-21 ENCOUNTER — TELEPHONE (OUTPATIENT)
Dept: PRIMARY CARE CLINIC | Age: 82
End: 2022-07-21

## 2022-07-21 DIAGNOSIS — K21.9 GASTROESOPHAGEAL REFLUX DISEASE, UNSPECIFIED WHETHER ESOPHAGITIS PRESENT: Primary | ICD-10-CM

## 2022-07-21 NOTE — TELEPHONE ENCOUNTER
Last Visit Date: 7/11/2022   Next Visit Date: 8/8/2022   Pt called tiera wanted to know if he could have a reference for a GI doctor

## 2022-08-08 ENCOUNTER — OFFICE VISIT (OUTPATIENT)
Dept: PRIMARY CARE CLINIC | Age: 82
End: 2022-08-08
Payer: MEDICARE

## 2022-08-08 VITALS
DIASTOLIC BLOOD PRESSURE: 70 MMHG | RESPIRATION RATE: 16 BRPM | HEART RATE: 65 BPM | HEIGHT: 73 IN | SYSTOLIC BLOOD PRESSURE: 116 MMHG | OXYGEN SATURATION: 96 % | WEIGHT: 238 LBS | BODY MASS INDEX: 31.54 KG/M2

## 2022-08-08 DIAGNOSIS — R91.1 NODULE OF LEFT LUNG: ICD-10-CM

## 2022-08-08 DIAGNOSIS — R93.1 ABNORMAL ECHOCARDIOGRAM: ICD-10-CM

## 2022-08-08 DIAGNOSIS — R06.02 SOB (SHORTNESS OF BREATH) ON EXERTION: ICD-10-CM

## 2022-08-08 DIAGNOSIS — M25.475 BILATERAL SWELLING OF FEET AND ANKLES: Primary | ICD-10-CM

## 2022-08-08 DIAGNOSIS — M25.471 BILATERAL SWELLING OF FEET AND ANKLES: Primary | ICD-10-CM

## 2022-08-08 DIAGNOSIS — M25.472 BILATERAL SWELLING OF FEET AND ANKLES: Primary | ICD-10-CM

## 2022-08-08 DIAGNOSIS — R68.89 DECREASED ACTIVITY TOLERANCE: ICD-10-CM

## 2022-08-08 DIAGNOSIS — I35.9 AORTIC VALVE CALCIFICATION: ICD-10-CM

## 2022-08-08 DIAGNOSIS — M25.474 BILATERAL SWELLING OF FEET AND ANKLES: Primary | ICD-10-CM

## 2022-08-08 PROBLEM — R39.15 URINARY URGENCY: Status: ACTIVE | Noted: 2022-07-20

## 2022-08-08 PROCEDURE — G8417 CALC BMI ABV UP PARAM F/U: HCPCS | Performed by: NURSE PRACTITIONER

## 2022-08-08 PROCEDURE — 1036F TOBACCO NON-USER: CPT | Performed by: NURSE PRACTITIONER

## 2022-08-08 PROCEDURE — 99214 OFFICE O/P EST MOD 30 MIN: CPT | Performed by: NURSE PRACTITIONER

## 2022-08-08 PROCEDURE — 1123F ACP DISCUSS/DSCN MKR DOCD: CPT | Performed by: NURSE PRACTITIONER

## 2022-08-08 PROCEDURE — G8427 DOCREV CUR MEDS BY ELIG CLIN: HCPCS | Performed by: NURSE PRACTITIONER

## 2022-08-08 ASSESSMENT — ENCOUNTER SYMPTOMS
TROUBLE SWALLOWING: 0
DIARRHEA: 0
SINUS PRESSURE: 0
CONSTIPATION: 0
NAUSEA: 0
BLOOD IN STOOL: 0
SORE THROAT: 0
SHORTNESS OF BREATH: 1
VOMITING: 0
ABDOMINAL PAIN: 0
COUGH: 1
WHEEZING: 0

## 2022-08-08 ASSESSMENT — PATIENT HEALTH QUESTIONNAIRE - PHQ9
2. FEELING DOWN, DEPRESSED OR HOPELESS: 0
1. LITTLE INTEREST OR PLEASURE IN DOING THINGS: 0
SUM OF ALL RESPONSES TO PHQ QUESTIONS 1-9: 0
SUM OF ALL RESPONSES TO PHQ9 QUESTIONS 1 & 2: 0
SUM OF ALL RESPONSES TO PHQ QUESTIONS 1-9: 0

## 2022-08-08 NOTE — PROGRESS NOTES
[facility] \[facility]\"" PRIMARY CARE  [address]  [address] [address]  Dept: [phone]  Dept Fax: [phone]    Katey Masterson is a 80 y.o. male who presentstoday for his medical conditions/complaints as noted below.   Katey Masterson is c/o of  Chief Complaint   Patient presents with    Leg Swelling     1 month follow up            HPI:     Here today for follow up on leg swelling  Has not had much improvement  Has been wearing compression knee high intermittently and states \"helps for a little while\"  He complains of a feeling of increasing and discomfort especially while walking  States he spends a lot of time sitting, \"because I just seem to get really tired easily\"  Has been noticing some shortness of breath with activity  Seems to get worse when he lies flat, reports spends most nights sleeping in chair so that he can be more upright  Denies any heartburn, dyspepsia or feeling of reflux  Has a morning cough that is often productive of thick sputum        Hemoglobin A1C (%)   Date Value   12/29/2020 5.5             ( goal A1C is < 7)   No results found for: LABMICR  LDL Cholesterol (mg/dL)   Date Value   07/12/2022 78   08/13/2021 149 (H)   07/12/2021 146 (H)     LDL Calculated (mg/dL)   Date Value   10/31/2017 153   09/28/2016 101       (goal LDL is <100)   AST (U/L)   Date Value   07/12/2022 16     ALT (U/L)   Date Value   07/12/2022 13     BUN (mg/dL)   Date Value   07/12/2022 13     BP Readings from Last 3 Encounters:   08/08/22 116/70   07/11/22 110/78   01/10/22 110/70          ([id number])    Past Medical History:   Diagnosis Date    COPD (chronic obstructive pulmonary disease) ([facility]     Diverticulosis     Gastritis     GERD (gastroesophageal reflux disease)     History of thoracentesis 2005    left    Hyperlipidemia     Inguinal hernia 08/03/2020    right    Multiple lung nodules 2005    left    Prostate cancer ([facility]     Seasonal allergies       Past Surgical History:   Procedure Laterality Date    CARDIAC CATHETERIZATION      no stents reported    CATARACT REMOVAL WITH IMPLANT Bilateral     COLONOSCOPY      done about 10 years ago    COLONOSCOPY  11/10/2016    severe diverticulosis     CYST REMOVAL      groin, left arm    ENDOSCOPY, COLON, DIAGNOSTIC      HEMORRHOID SURGERY      HERNIA REPAIR Right 8/3/2020    HERNIA INGUINAL REPAIR LAPAROSCOPIC ROBOTIC performed by Alyce Carter MD at [facility] Right 2020    Robotic Lap.  Rt Inguinal Hernia Repair with Mesh    LUNG BIOPSY  2005    left side    OTHER SURGICAL HISTORY      prostate seeding    UPPER GASTROINTESTINAL ENDOSCOPY      done about 10 years ago    UPPER GASTROINTESTINAL ENDOSCOPY  11/10/2016    duodenal bulb polyp, MODERATE GASTRITIS        Family History   Problem Relation Age of Onset    Cancer Mother     Cancer Father         colon          Social History     Tobacco Use    Smoking status: Former     Packs/day: 1.00     Years: 20.00     Pack years: 20.00     Types: Cigarettes     Quit date: 5     Years since quittin.6    Smokeless tobacco: Never    Tobacco comments:     stopped smoking 20 years   Substance Use Topics    Alcohol use: Yes     Comment: occ      Current Outpatient Medications   Medication Sig Dispense Refill    co-enzyme Q-10 30 MG capsule Take 30 mg by mouth 3 times daily      furosemide (LASIX) 20 MG tablet Take 1 tablet by mouth daily 5 tablet 0    triamcinolone (ARISTOCORT) 0.5 % cream Apply topically 2 times daily as needed to ear canals until symptoms resolve 15 g 1    clopidogrel (PLAVIX) 75 MG tablet TAKE 1 TABLET ONCE DAILY 90 tablet 3    atorvastatin (LIPITOR) 40 MG tablet TAKE 1 TABLET NIGHTLY 90 tablet 3    solifenacin (VESICARE) 10 MG tablet Take 10 mg by mouth daily       albuterol sulfate HFA (PROAIR HFA) 108 (90 BASE) MCG/ACT inhaler Inhale 2 puffs into the lungs every 6 hours as needed for Wheezing 1 Inhaler 5 Glucos-Chond-Hyal Ac-Ca Fructo (MOVE FREE JOINT HEALTH ADVANCE) TABS Take 1 tablet by mouth daily        No current facility-administered medications for this visit. No Known Allergies    Health Maintenance   Topic Date Due    Shingles vaccine (2 of 3) 07/21/2012    COVID-19 Vaccine (4 - Booster for Pfizer series) 02/20/2022    Flu vaccine (1) 09/01/2022    Lipids  07/12/2023    Annual Wellness Visit (AWV)  07/12/2023    Prostate Specific Antigen (PSA) Screening or Monitoring  07/12/2023    Depression Screen  08/08/2023    DTaP/Tdap/Td vaccine (2 - Td or Tdap) 01/13/2028    Pneumococcal 65+ years Vaccine  Completed    Hepatitis A vaccine  Aged Out    Hepatitis B vaccine  Aged Out    Hib vaccine  Aged Out    Meningococcal (ACWY) vaccine  Aged Out    Hepatitis C screen  Discontinued       Subjective:      Review of Systems   Constitutional:  Positive for fatigue (Tires easily). Negative for activity change, appetite change, chills, fever and unexpected weight change. HENT:  Positive for hearing loss. Negative for congestion, ear pain, sinus pressure, sore throat and trouble swallowing. Eyes:  Negative for visual disturbance. Respiratory:  Positive for cough (In the mornings) and shortness of breath (Intermittent). Negative for wheezing. Cardiovascular:  Positive for leg swelling. Negative for chest pain and palpitations. Gastrointestinal:  Negative for abdominal pain, blood in stool, constipation, diarrhea, nausea and vomiting. Endocrine: Negative for cold intolerance, heat intolerance, polydipsia, polyphagia and polyuria. Genitourinary:  Negative for difficulty urinating, frequency, hematuria and urgency. Musculoskeletal:  Positive for arthralgias. Negative for myalgias. Skin:  Negative for rash. Allergic/Immunologic: Negative for environmental allergies. Neurological:  Negative for dizziness, weakness, light-headedness and headaches. Psychiatric/Behavioral:  Negative for confusion.  The patient is not nervous/anxious. Objective:     Physical Exam  Constitutional:       Appearance: Normal appearance. He is well-developed. HENT:      Head: Normocephalic. Eyes:      Conjunctiva/sclera: Conjunctivae normal.      Pupils: Pupils are equal, round, and reactive to light. Cardiovascular:      Rate and Rhythm: Normal rate and regular rhythm. Heart sounds: Normal heart sounds. No murmur heard. Pulmonary:      Effort: Pulmonary effort is normal.      Breath sounds: Normal breath sounds. No wheezing. Abdominal:      General: Bowel sounds are normal. There is no distension. Palpations: Abdomen is soft. Musculoskeletal:         General: Normal range of motion. Cervical back: Normal range of motion. Right lower leg: Edema (+2-3) present. Left lower leg: Edema (+2-3) present. Skin:     General: Skin is warm and dry. Neurological:      Mental Status: He is alert and oriented to person, place, and time. Psychiatric:         Behavior: Behavior normal.         Thought Content: Thought content normal.         Judgment: Judgment normal.     /70 (Site: Left Upper Arm, Position: Sitting, Cuff Size: Medium Adult)   Pulse 65   Resp 16   Ht 6' 1\" (1.854 m)   Wt 238 lb (108 kg)   SpO2 96%   BMI 31.40 kg/m²     Assessment:       Diagnosis Orders   1. Bilateral swelling of feet and ankles  ECHO Complete 2D W Doppler W Color    AFL - Clive Ramsay MD, Vascular Surgery, [place]      2. Decreased activity tolerance  ECHO Complete 2D W Doppler W Color    Low Dose Chest CT -Abnormal Lung Screen Follow up      3. Nodule of left lung  Low Dose Chest CT -Abnormal Lung Screen Follow up      4. SOB (shortness of breath) on exertion  Low Dose Chest CT -Abnormal Lung Screen Follow up                Plan:      Return if symptoms worsen or fail to improve.     Leg swelling, decreased activity tolerance, left lung nodule, shortness of breath-lengthy discussion with review of chart, is past due for CT lung screening, was to follow-up in 3 months time/2021 but his was not scheduled  Symptoms are somewhat concerning for possible early CHF versus changes in lung status. We will check echo, CT chest.  Also referral placed for vascular consult. Advised in the short-term to continue Lasix daily, compression knee-high's and elevate legs as much as possible while sitting  Exam not concerning for acute heart failure at this time  Orders Placed This Encounter   Procedures    Low Dose Chest CT -Abnormal Lung Screen Follow up     Age: 80 y.o. Smoking History:   Social History    Tobacco Use      Smoking status: Former        Packs/day: 1.00        Years: 20.00        Pack years: 21        Types: Cigarettes        Quit date:         Years since quittin.6      Smokeless tobacco: Never      Tobacco comments: stopped smoking 20 years    Vaping Use      Vaping Use: Never used    Alcohol use: Yes      Comment: occ    Drug use: No    Pack years: 20  Last CT lung screen: No previous lung cancer screening exam     Standing Status:   Future     Standing Expiration Date:   2023     Order Specific Question:   Reason for exam:     Answer:   last , was to repeat in march but this never was completed    KATH - Sophia Gómez MD, Vascular Surgery, [place]     Referral Priority:   Routine     Referral Type:   Eval and Treat     Referral Reason:   Specialty Services Required     Referred to Provider:   Vanderbilt Libman, MD     Requested Specialty:   Vascular Surgery     Number of Visits Requested:   1    ECHO Complete 2D W Doppler W Color     Standing Status:   Future     Standing Expiration Date:   2023     Order Specific Question:   Reason for exam:     Answer:   SWELLING, FATIGUE      No orders of the defined types were placed in this encounter. Patient given educational materials - see patient instructions. Discussed use, benefit, and side effects of prescribed medications.   All patientquestions answered. Pt voiced understanding. Reviewed health maintenance. Instructedto continue current medications, diet and exercise. Patient agreed with treatmentplan. Follow up as directed.      Electronicallysigned by SHAHZAD Yan CNP on 8/8/2022 at 3:36 PM

## 2022-08-15 ENCOUNTER — HOSPITAL ENCOUNTER (OUTPATIENT)
Dept: CT IMAGING | Age: 82
Discharge: HOME | End: 2022-08-17
Payer: MEDICARE

## 2022-08-15 ENCOUNTER — HOSPITAL ENCOUNTER (OUTPATIENT)
Dept: NON INVASIVE DIAGNOSTICS | Age: 82
Discharge: HOME | End: 2022-08-17
Payer: MEDICARE

## 2022-08-15 DIAGNOSIS — R68.89 DECREASED ACTIVITY TOLERANCE: ICD-10-CM

## 2022-08-15 DIAGNOSIS — M25.471 BILATERAL SWELLING OF FEET AND ANKLES: ICD-10-CM

## 2022-08-15 DIAGNOSIS — R06.02 SOB (SHORTNESS OF BREATH) ON EXERTION: ICD-10-CM

## 2022-08-15 DIAGNOSIS — R91.1 NODULE OF LEFT LUNG: ICD-10-CM

## 2022-08-15 DIAGNOSIS — M25.474 BILATERAL SWELLING OF FEET AND ANKLES: ICD-10-CM

## 2022-08-15 DIAGNOSIS — M25.475 BILATERAL SWELLING OF FEET AND ANKLES: ICD-10-CM

## 2022-08-15 DIAGNOSIS — M25.472 BILATERAL SWELLING OF FEET AND ANKLES: ICD-10-CM

## 2022-08-15 LAB
LV EF: 65 %
LVEF MODALITY: NORMAL

## 2022-08-15 PROCEDURE — 93306 TTE W/DOPPLER COMPLETE: CPT

## 2022-08-15 PROCEDURE — 71250 CT THORAX DX C-: CPT

## 2022-09-26 NOTE — ANESTHESIA POSTPROCEDURE EVALUATION
Department of Anesthesiology  Postprocedure Note    Patient: Rosa Briceno  MRN: [id number]  YOB: 1940  Date of evaluation: 8/3/2020  Time:  10:44 AM     Procedure Summary     Date:  08/03/20 Room / Location:  [address] [place] [address] / [address] [place] [address]    Anesthesia Start:  5348 Anesthesia Stop:  7792    Procedure: HERNIA INGUINAL REPAIR LAPAROSCOPIC ROBOTIC (Right ) Diagnosis:  (RIGHT INGUINAL HERNIA)    Surgeon: Sumanth Allan MD Responsible Provider:  SHAHZAD Hanson CRNA    Anesthesia Type:  general, regional ASA Status:  2          Anesthesia Type: general, regional    Claritza Phase I: Claritza Score: 5    Claritza Phase II:      Last vitals: Reviewed and per EMR flowsheets.        Anesthesia Post Evaluation    Patient location during evaluation: PACU  Patient participation: complete - patient participated  Level of consciousness: awake and alert  Airway patency: patent  Nausea & Vomiting: no nausea and no vomiting  Complications: no  Cardiovascular status: hemodynamically stable  Respiratory status: face mask and spontaneous ventilation  Hydration status: euvolemic GEN: Awake, alert, interactive, NAD.  HEAD AND NECK: NC/AT. Airway patent. Neck supple.   EYES:  Clear b/l.    ENT: Moist mucus membranes.   CARDIAC: Regular rate, regular rhythm. No evident pedal edema.    RESP/CHEST: Normal respiratory effort with no use of accessory muscles or retractions. Clear throughout on auscultation.  ABD: soft, non-distended, Mild tenderness to the right pelvic region.  No rebound, no guarding.   PELVIC: Pt deferred   BACK: No midline spinal TTP. No CVAT.   EXTREMITIES: Moving all extremities with no apparent deformities.   SKIN: Warm, dry, intact normal color. No rash.   NEURO: AOx3, CN II-XII grossly intact, no focal deficits.   PSYCH: Appropriate mood and affect.

## 2022-10-07 ENCOUNTER — HOSPITAL ENCOUNTER (OUTPATIENT)
Dept: CARDIAC CATH/INVASIVE PROCEDURES | Age: 82
Discharge: HOME | End: 2022-10-07
Payer: MEDICARE

## 2022-10-07 VITALS
HEART RATE: 64 BPM | OXYGEN SATURATION: 97 % | SYSTOLIC BLOOD PRESSURE: 129 MMHG | RESPIRATION RATE: 13 BRPM | DIASTOLIC BLOOD PRESSURE: 73 MMHG

## 2022-10-07 LAB
EGFR, POC: 60 ML/MIN/1.73M2
GFR NON-AFRICAN AMERICAN: 57 ML/MIN
GFR SERPL CREATININE-BSD FRML MDRD: >60 ML/MIN
GLUCOSE BLD-MCNC: 100 MG/DL (ref 74–100)
LV EF: 55 %
LVEF MODALITY: NORMAL
POC BUN: 9 MG/DL (ref 8–26)
POC CHLORIDE: 106 MMOL/L (ref 98–107)
POC CREATININE: 1.23 MG/DL (ref 0.51–1.19)
POC HEMATOCRIT: 43 % (ref 41–53)
POC HEMOGLOBIN: 14.5 G/DL (ref 13.5–17.5)
POC IONIZED CALCIUM: 1.21 MMOL/L (ref 1.15–1.33)
POC POTASSIUM: 4.1 MMOL/L (ref 3.5–4.5)
POC SODIUM: 143 MMOL/L (ref 138–146)

## 2022-10-07 PROCEDURE — 84295 ASSAY OF SERUM SODIUM: CPT

## 2022-10-07 PROCEDURE — 82330 ASSAY OF CALCIUM: CPT

## 2022-10-07 PROCEDURE — 82947 ASSAY GLUCOSE BLOOD QUANT: CPT

## 2022-10-07 PROCEDURE — 84520 ASSAY OF UREA NITROGEN: CPT

## 2022-10-07 PROCEDURE — 85014 HEMATOCRIT: CPT

## 2022-10-07 PROCEDURE — 2709999900 HC NON-CHARGEABLE SUPPLY

## 2022-10-07 PROCEDURE — 93312 ECHO TRANSESOPHAGEAL: CPT

## 2022-10-07 PROCEDURE — 6360000002 HC RX W HCPCS

## 2022-10-07 PROCEDURE — 93308 TTE F-UP OR LMTD: CPT

## 2022-10-07 PROCEDURE — 93320 DOPPLER ECHO COMPLETE: CPT

## 2022-10-07 PROCEDURE — 99152 MOD SED SAME PHYS/QHP 5/>YRS: CPT

## 2022-10-07 PROCEDURE — 82565 ASSAY OF CREATININE: CPT

## 2022-10-07 PROCEDURE — 84132 ASSAY OF SERUM POTASSIUM: CPT

## 2022-10-07 PROCEDURE — 93325 DOPPLER ECHO COLOR FLOW MAPG: CPT

## 2022-10-07 PROCEDURE — 7100000000 HC PACU RECOVERY - FIRST 15 MIN

## 2022-10-07 PROCEDURE — 82435 ASSAY OF BLOOD CHLORIDE: CPT

## 2022-10-07 PROCEDURE — 7100000001 HC PACU RECOVERY - ADDTL 15 MIN

## 2022-10-07 RX ORDER — SODIUM CHLORIDE 9 MG/ML
INJECTION, SOLUTION INTRAVENOUS CONTINUOUS
OUTPATIENT
Start: 2022-10-07

## 2022-10-07 NOTE — DISCHARGE INSTRUCTIONS
TRANSESOPHAGEAL ECHOCARDIOGRAM / CHRYSTAL DISCHARGE INSTRUCTIONS    If the following occurs call your physician or seek help    -trouble with swallowing    -spitting or coughing up blood    -severe pain in the throat region / your throat can be sore for several days but pain should not increase as days continue    Do not drive or operate heavy machinery today due to sedation            Transesophageal Echocardiogram:  What is a transesophageal echocardiogram?     A transesophageal echocardiogram is a test to help your doctor look at the inside of your heart. A small device called a transducer directs sound waves toward your heart. The sound waves make a picture of the heart's valves and chambers. Your doctor may do this test to look for certain types of heart disease. Or it may be done to see how disease is affecting your heart. You will be given medicine to make you sleepy and comfortable during the test.  The doctor puts a small, flexible tube into your throat and guides it to the esophagus. This is the tube that connects your mouth to your stomach. The doctor will ask you to swallow as the tube goes down. The transducer is at the tip of the tube. It gets close to your heart to make clear pictures. The doctor will look at the ultrasound pictures on a screen. You will not be able to eat or drink until the numbness from the throat spray wears off. Your throat may be sore for a few days after the test.  Follow-up care is a key part of your treatment and safety. Be sure to make and go to all appointments, and call your doctor if you are having problems. It's also a good idea to know your test results and keep a list of your current medications. Going home  Be sure you have someone to drive you home. Anesthesia and pain medicine make it unsafe for you to drive. Where can you learn more? Go to [email or web address] org and sign in to your [employer] account.  Enter K500 in the [address] Information box to learn more about Transesophageal Echocardiogram: Before Your Procedure.     If you do not have an account, please click on the Sign Up Now link. © 4361-6125 Healthwise, Incorporated. Care instructions adapted under license by [facility] ([facility]). This care instruction is for use with your licensed healthcare professional. If you have questions about a medical condition or this instruction, always ask your healthcare professional. [address] any warranty or liability for your use of this information.   Content Version: 36.6.257222; Current as of: February 20, 2015

## 2022-10-07 NOTE — H&P
[facility]  Procedure History and Physical Update          Patient Name: Lizzie Varela  MRN:    [id number]  YOB: 1940  Date of evaluation:  10/7/2022    Procedure:    CHRYSTAL    Indication for procedure: Aortic valve evaluation for AS      Please refer to the office note completed by Dr. Sol Vaughan on 9/28/2022 in the medical record and note that:    [x] I have examined the patient and reviewed the H&P/Consult and there are no changes to be made to the assessment or plan. [] I have examined the patient and reviewed the H&P/Consult and have noted the following changes:    Past Medical History:   Diagnosis Date    COPD (chronic obstructive pulmonary disease) ([facility] [address]     Diverticulosis     Gastritis     GERD (gastroesophageal reflux disease)     History of thoracentesis 2005    left    Hyperlipidemia     Inguinal hernia 08/03/2020    right    Multiple lung nodules 2005    left    Prostate cancer ([facility] [address]     Seasonal allergies        Past Surgical History:   Procedure Laterality Date    CARDIAC CATHETERIZATION  2005    no stents reported    CATARACT REMOVAL WITH IMPLANT Bilateral     COLONOSCOPY      done about 10 years ago    COLONOSCOPY  11/10/2016    severe diverticulosis     CYST REMOVAL      groin, left arm    ENDOSCOPY, COLON, DIAGNOSTIC      HEMORRHOID SURGERY      HERNIA REPAIR Right 8/3/2020    HERNIA INGUINAL REPAIR LAPAROSCOPIC ROBOTIC performed by Frances Wan MD at [address] [place] [address] Right 08/03/2020    Robotic Lap.  Rt Inguinal Hernia Repair with Mesh    LUNG BIOPSY  2005    left side    OTHER SURGICAL HISTORY      prostate seeding    UPPER GASTROINTESTINAL ENDOSCOPY      done about 10 years ago    UPPER GASTROINTESTINAL ENDOSCOPY  11/10/2016    duodenal bulb polyp, MODERATE GASTRITIS        Family History   Problem Relation Age of Onset    Cancer Mother     Cancer Father         colon       No Known Allergies    Prior to Admission medications Medication Sig Start Date End Date Taking? Authorizing Provider   Fluticasone Propionate (FLONASE NA) by Nasal route daily   Yes Historical Provider, MD   co-enzyme Q-10 30 MG capsule Take 30 mg by mouth 3 times daily    Historical Provider, MD   furosemide (LASIX) 20 MG tablet Take 1 tablet by mouth daily 7/11/22   SHAHZAD Avalos CNP   triamcinolone (ARISTOCORT) 0.5 % cream Apply topically 2 times daily as needed to ear canals until symptoms resolve 7/11/22   SHAHZAD Avalos CNP   clopidogrel (PLAVIX) 75 MG tablet TAKE 1 TABLET ONCE DAILY 4/15/22   SHAHZAD Avalos CNP   atorvastatin (LIPITOR) 40 MG tablet TAKE 1 TABLET NIGHTLY 4/15/22   SHAHZAD Avalos CNP   solifenacin (VESICARE) 10 MG tablet Take 10 mg by mouth daily  5/5/21   Historical Provider, MD   albuterol sulfate HFA (PROAIR HFA) 108 (90 BASE) MCG/ACT inhaler Inhale 2 puffs into the lungs every 6 hours as needed for Wheezing 1/24/17   SHAHZAD Arcos CNP   Glucos-Chond-Hyal Ac-Ca Fructo (MOVE FREE JOINT HEALTH ADVANCE) TABS Take 1 tablet by mouth daily     Historical Provider, MD         Vitals:    10/07/22 1245   BP: 128/77   Pulse: 64   Resp: 16   SpO2: 95%       Constitutional and General Appearance:   alert, cooperative, no distress and appears stated age  HEENT:  PERRL, EOMI  Respiratory:  Normal excursion and expansion without use of accessory muscles  Resp Auscultation:  Good respiratory effort. No for increased work of breathing. On auscultation: clear to auscultation bilaterally  Cardiovascular:  Regular rate and rhythm. S1/S2  No murmurs. The apical impulse is not displaced  Abdomen:  Soft  Bowel sounds present  Non-tender to palpation  Extremities:  No cyanosis or clubbing  Lower extremity edema: No.  Skin:  Warm and dry  Neurological:  Alert and oriented. Moves all extremities well    Assessment:  Aortic valve evaluation for possible AS    Plan:  Proceed with planned procedure.   Further orders to follow. Transesophageal echocardiography (procedure) has been fully reviewed with the patient and written informed consent has been obtained. The risks, benefits, and alternatives were discussed, in detail, with patient. Briefly, the potential risks include, but are not limited to, bleeding, damage to teeth or pharynx, esophageal damage or rupture, chamber perforation, tamponade, respiratory failure requiring intubation, need for emergent surgery, and even death. All questions and concerns were answered. Patient agreed to proceed with the procedure understanding the above risks and alternatives to the procedure. Risks, benefits, alternatives, and details discussed extensively. Accepts and consents. Electronically signed on 10/07/22 at 12:52 PM by:    Che Saenz MD, MD   Fellow, [address]    Attending Physician Statement  I have discussed the care of Katerina Ware, including pertinent history and exam findings,  with the cardiology fellow/resident. I have seen and examined the patient and the key elements of all parts of the encounter have been performed by me. I have completed at least one if not all key elements of the E/M (history, physical exam, and MDM).      Rae Xavier MD, [facility]

## 2022-10-07 NOTE — PROCEDURES
[facility]  Transesophageal Echocardiogram       Today's Date:  10/7/2022  Indication:   Aortic valve evaluation AS    Operators:  Primary:   Dr Klarissa Howe (Attending Physician)  Assistant:   Harman Clark MD (Cardiovascular Fellow)      Pre Procedure Conscious Sedation Data:    ASA Class:    [] I [x] II [] III [] IV    Mallampati Class:  [] I [x] II [] III [] IV    Procedure:    Patient seen and examined. History and Physical reviewed. Labs reviewed. After informed consent was obtained with explanation of the risks and benefits, the patient was brought to cardiac cath lab. All sedation was administered by the cardiologist. The oropharynx was pre-anesthesized with viscous lidocaine and cetacaine spray. The ultrasound probe was passed without any difficulty. CHRYSTAL findings:    LA:  Normal  TABITHA:  No thrombus  RA:  Normal  RV:   Normal  LV:  Normal  Estimated LVEF:  65%  Aorta:   Mild atheromatous disease arch  Pericardium: No pericardial effusion  Septum:  Aneurysmal interatrial septum. No intracardiac shunt via color Doppler. Valves:    Mitral Valve: Structurally normal. Mild regurgitation is identified. Aortic Valve: The aortic valve is trileaflet, mild calcification of the leaflets noted, opening well. Mild regurgitiation is identified. LUISANA by planimetry is 2.48 cm2. Mean gradient through transthoracic was measured at 5 mm hg.     Tricuspid valve: Structurally normal. No regurgitation is identified. Pulmonary valve: Normal. No significant regurgitation    No valvular vegetations or thrombus identified. Summary:     1. A CHRYSTAL was performed without complications. 2. Normal LVEF. LVEF 55%  3. No thrombus or valvular vegetation identified  4. No intracardiac shunt via color Doppler. 5. Mild AI, Mild MR  6.  Mildly calcified aortic valve with no significant stenosis (mean gradient 5 mm hg and LUISANA by planimetery was 2.48 cm2)       Electronically signed by Harman Clark MD on 10/7/2022 at 12:54 PM  Cardiovascular Diseases Fellow  [address]       Attending Physician.         Pita Laguerre MD, [facility] [place], [place]

## 2023-01-16 ENCOUNTER — HOSPITAL ENCOUNTER (OUTPATIENT)
Age: 83
Setting detail: SPECIMEN
Discharge: HOME | End: 2023-01-16

## 2023-01-16 LAB — PROSTATE SPECIFIC ANTIGEN: <0.02 NG/ML

## 2023-04-05 ENCOUNTER — OFFICE VISIT (OUTPATIENT)
Dept: PRIMARY CARE CLINIC | Age: 83
End: 2023-04-05

## 2023-04-05 VITALS
RESPIRATION RATE: 16 BRPM | SYSTOLIC BLOOD PRESSURE: 118 MMHG | HEART RATE: 66 BPM | DIASTOLIC BLOOD PRESSURE: 76 MMHG | HEIGHT: 73 IN | WEIGHT: 236.8 LBS | OXYGEN SATURATION: 97 % | BODY MASS INDEX: 31.38 KG/M2

## 2023-04-05 DIAGNOSIS — M54.42 ACUTE LEFT-SIDED LOW BACK PAIN WITH LEFT-SIDED SCIATICA: Primary | ICD-10-CM

## 2023-04-05 SDOH — ECONOMIC STABILITY: FOOD INSECURITY: WITHIN THE PAST 12 MONTHS, THE FOOD YOU BOUGHT JUST DIDN'T LAST AND YOU DIDN'T HAVE MONEY TO GET MORE.: NEVER TRUE

## 2023-04-05 SDOH — ECONOMIC STABILITY: FOOD INSECURITY: WITHIN THE PAST 12 MONTHS, YOU WORRIED THAT YOUR FOOD WOULD RUN OUT BEFORE YOU GOT MONEY TO BUY MORE.: NEVER TRUE

## 2023-04-05 SDOH — ECONOMIC STABILITY: INCOME INSECURITY: HOW HARD IS IT FOR YOU TO PAY FOR THE VERY BASICS LIKE FOOD, HOUSING, MEDICAL CARE, AND HEATING?: NOT HARD AT ALL

## 2023-04-05 SDOH — ECONOMIC STABILITY: HOUSING INSECURITY
IN THE LAST 12 MONTHS, WAS THERE A TIME WHEN YOU DID NOT HAVE A STEADY PLACE TO SLEEP OR SLEPT IN A SHELTER (INCLUDING NOW)?: NO

## 2023-04-05 ASSESSMENT — PATIENT HEALTH QUESTIONNAIRE - PHQ9
SUM OF ALL RESPONSES TO PHQ QUESTIONS 1-9: 0
SUM OF ALL RESPONSES TO PHQ9 QUESTIONS 1 & 2: 0
SUM OF ALL RESPONSES TO PHQ QUESTIONS 1-9: 0
SUM OF ALL RESPONSES TO PHQ QUESTIONS 1-9: 0
1. LITTLE INTEREST OR PLEASURE IN DOING THINGS: 0
SUM OF ALL RESPONSES TO PHQ QUESTIONS 1-9: 0
2. FEELING DOWN, DEPRESSED OR HOPELESS: 0

## 2023-04-05 ASSESSMENT — ENCOUNTER SYMPTOMS
EYE PAIN: 0
BACK PAIN: 1
RHINORRHEA: 0
VOMITING: 0
NAUSEA: 0
CONSTIPATION: 0
DIARRHEA: 0
SHORTNESS OF BREATH: 0
ABDOMINAL PAIN: 0
COUGH: 0
BOWEL INCONTINENCE: 0
SINUS PAIN: 0

## 2023-04-05 NOTE — PROGRESS NOTES
< 7)   No results found for: LABMICR  LDL Cholesterol (mg/dL)   Date Value   2022 78   2021 149 (H)   2021 146 (H)     LDL Calculated (mg/dL)   Date Value   10/31/2017 153   2016 101       (goal LDL is <100)   AST (U/L)   Date Value   2022 16     ALT (U/L)   Date Value   2022 13     BUN (mg/dL)   Date Value   2022 13     BP Readings from Last 3 Encounters:   23 118/76   10/07/22 129/73   22 116/70          (goal 120/80)    Past Medical History:   Diagnosis Date    COPD (chronic obstructive pulmonary disease) (HCC)     Diverticulosis     Gastritis     GERD (gastroesophageal reflux disease)     History of thoracentesis     left    Hyperlipidemia     Inguinal hernia 2020    right    Multiple lung nodules 2005    left    Prostate cancer ([address]     Seasonal allergies       Past Surgical History:   Procedure Laterality Date    CARDIAC CATHETERIZATION      no stents reported    CATARACT REMOVAL WITH IMPLANT Bilateral     COLONOSCOPY      done about 10 years ago    COLONOSCOPY  11/10/2016    severe diverticulosis     CYST REMOVAL      groin, left arm    ENDOSCOPY, COLON, DIAGNOSTIC      HEMORRHOID SURGERY      HERNIA REPAIR Right 8/3/2020    HERNIA INGUINAL REPAIR LAPAROSCOPIC ROBOTIC performed by Reny Benjamin MD at [address] [place] [address] Right 2020    Robotic Lap.  Rt Inguinal Hernia Repair with Mesh    LUNG BIOPSY      left side    OTHER SURGICAL HISTORY      prostate seeding    UPPER GASTROINTESTINAL ENDOSCOPY      done about 10 years ago    UPPER GASTROINTESTINAL ENDOSCOPY  11/10/2016    duodenal bulb polyp, MODERATE GASTRITIS        Family History   Problem Relation Age of Onset    Cancer Mother     Cancer Father         colon       Social History     Tobacco Use    Smoking status: Former     Packs/day: 1.00     Years: 20.00     Pack years: 20.00     Types: Cigarettes     Quit date:      Years since quittin.2

## 2023-04-21 DIAGNOSIS — E78.5 DYSLIPIDEMIA: ICD-10-CM

## 2023-04-21 DIAGNOSIS — G45.9 TIA (TRANSIENT ISCHEMIC ATTACK): ICD-10-CM

## 2023-04-21 RX ORDER — ATORVASTATIN CALCIUM 40 MG/1
TABLET, FILM COATED ORAL
Qty: 90 TABLET | Refills: 1 | Status: SHIPPED | OUTPATIENT
Start: 2023-04-21

## 2023-10-15 DIAGNOSIS — E78.5 DYSLIPIDEMIA: ICD-10-CM

## 2023-10-15 DIAGNOSIS — G45.9 TIA (TRANSIENT ISCHEMIC ATTACK): ICD-10-CM

## 2023-10-16 RX ORDER — ATORVASTATIN CALCIUM 40 MG/1
TABLET, FILM COATED ORAL
Qty: 90 TABLET | Refills: 3 | Status: SHIPPED | OUTPATIENT
Start: 2023-10-16

## 2024-04-17 DIAGNOSIS — G45.9 TIA (TRANSIENT ISCHEMIC ATTACK): ICD-10-CM

## 2024-04-17 RX ORDER — CLOPIDOGREL BISULFATE 75 MG/1
75 TABLET ORAL DAILY
Qty: 30 TABLET | Refills: 0 | Status: SHIPPED | OUTPATIENT
Start: 2024-04-17 | End: 2024-05-07 | Stop reason: SDUPTHER

## 2024-04-17 NOTE — TELEPHONE ENCOUNTER
Please let him know he needs to come in for appt for further refills, I have not seen him since 2022  Thanks

## 2024-05-07 ENCOUNTER — OFFICE VISIT (OUTPATIENT)
Dept: PRIMARY CARE CLINIC | Age: 84
End: 2024-05-07
Payer: MEDICARE

## 2024-05-07 VITALS
HEART RATE: 65 BPM | OXYGEN SATURATION: 98 % | DIASTOLIC BLOOD PRESSURE: 68 MMHG | BODY MASS INDEX: 32.34 KG/M2 | HEIGHT: 73 IN | SYSTOLIC BLOOD PRESSURE: 126 MMHG | WEIGHT: 244 LBS

## 2024-05-07 DIAGNOSIS — Z13.0 SCREENING, ANEMIA, DEFICIENCY, IRON: ICD-10-CM

## 2024-05-07 DIAGNOSIS — Z85.46 HISTORY OF PROSTATE CANCER: ICD-10-CM

## 2024-05-07 DIAGNOSIS — G45.9 TIA (TRANSIENT ISCHEMIC ATTACK): ICD-10-CM

## 2024-05-07 DIAGNOSIS — Z00.00 MEDICARE ANNUAL WELLNESS VISIT, SUBSEQUENT: Primary | ICD-10-CM

## 2024-05-07 DIAGNOSIS — E78.5 DYSLIPIDEMIA: ICD-10-CM

## 2024-05-07 PROCEDURE — 1123F ACP DISCUSS/DSCN MKR DOCD: CPT | Performed by: NURSE PRACTITIONER

## 2024-05-07 PROCEDURE — G0439 PPPS, SUBSEQ VISIT: HCPCS | Performed by: NURSE PRACTITIONER

## 2024-05-07 RX ORDER — CLOPIDOGREL BISULFATE 75 MG/1
75 TABLET ORAL DAILY
Qty: 30 TABLET | Refills: 0 | Status: SHIPPED | OUTPATIENT
Start: 2024-05-07 | End: 2024-05-07 | Stop reason: SDUPTHER

## 2024-05-07 RX ORDER — CLOPIDOGREL BISULFATE 75 MG/1
75 TABLET ORAL DAILY
Qty: 90 TABLET | Refills: 3 | Status: SHIPPED | OUTPATIENT
Start: 2024-05-07

## 2024-05-07 SDOH — ECONOMIC STABILITY: INCOME INSECURITY: HOW HARD IS IT FOR YOU TO PAY FOR THE VERY BASICS LIKE FOOD, HOUSING, MEDICAL CARE, AND HEATING?: NOT HARD AT ALL

## 2024-05-07 SDOH — ECONOMIC STABILITY: FOOD INSECURITY: WITHIN THE PAST 12 MONTHS, THE FOOD YOU BOUGHT JUST DIDN'T LAST AND YOU DIDN'T HAVE MONEY TO GET MORE.: NEVER TRUE

## 2024-05-07 SDOH — ECONOMIC STABILITY: FOOD INSECURITY: WITHIN THE PAST 12 MONTHS, YOU WORRIED THAT YOUR FOOD WOULD RUN OUT BEFORE YOU GOT MONEY TO BUY MORE.: NEVER TRUE

## 2024-05-07 ASSESSMENT — LIFESTYLE VARIABLES
HOW OFTEN DURING THE LAST YEAR HAVE YOU BEEN UNABLE TO REMEMBER WHAT HAPPENED THE NIGHT BEFORE BECAUSE YOU HAD BEEN DRINKING: NEVER
HOW OFTEN DO YOU HAVE A DRINK CONTAINING ALCOHOL: 2-3 TIMES A WEEK
HAVE YOU OR SOMEONE ELSE BEEN INJURED AS A RESULT OF YOUR DRINKING: NO
HOW OFTEN DURING THE LAST YEAR HAVE YOU FAILED TO DO WHAT WAS NORMALLY EXPECTED FROM YOU BECAUSE OF DRINKING: NEVER
HOW OFTEN DURING THE LAST YEAR HAVE YOU HAD A FEELING OF GUILT OR REMORSE AFTER DRINKING: NEVER
HAS A RELATIVE, FRIEND, DOCTOR, OR ANOTHER HEALTH PROFESSIONAL EXPRESSED CONCERN ABOUT YOUR DRINKING OR SUGGESTED YOU CUT DOWN: NO
HOW MANY STANDARD DRINKS CONTAINING ALCOHOL DO YOU HAVE ON A TYPICAL DAY: 1 OR 2
HOW OFTEN DURING THE LAST YEAR HAVE YOU FOUND THAT YOU WERE NOT ABLE TO STOP DRINKING ONCE YOU HAD STARTED: NEVER
HOW OFTEN DURING THE LAST YEAR HAVE YOU NEEDED AN ALCOHOLIC DRINK FIRST THING IN THE MORNING TO GET YOURSELF GOING AFTER A NIGHT OF HEAVY DRINKING: NEVER

## 2024-05-07 ASSESSMENT — PATIENT HEALTH QUESTIONNAIRE - PHQ9
SUM OF ALL RESPONSES TO PHQ QUESTIONS 1-9: 0
SUM OF ALL RESPONSES TO PHQ9 QUESTIONS 1 & 2: 0
1. LITTLE INTEREST OR PLEASURE IN DOING THINGS: NOT AT ALL
SUM OF ALL RESPONSES TO PHQ QUESTIONS 1-9: 0
2. FEELING DOWN, DEPRESSED OR HOPELESS: NOT AT ALL
SUM OF ALL RESPONSES TO PHQ QUESTIONS 1-9: 0
SUM OF ALL RESPONSES TO PHQ QUESTIONS 1-9: 0

## 2024-05-07 NOTE — PROGRESS NOTES
Medicare Annual Wellness Visit    Sarthak Caldwell is here for Medicare AWV    Assessment & Plan   Medicare annual wellness visit, subsequent  TIA (transient ischemic attack)-stable with no recurrence on Plavix, refill provided  -     clopidogrel (PLAVIX) 75 MG tablet; Take 1 tablet by mouth daily, Disp-90 tablet, R-3Normal  Dyslipidemia-stable on statin, past due for labs  -     Comprehensive Metabolic Panel; Future  -     Lipid Panel; Future  History of prostate cancer-no recurrence as per most recent PSA, he will follow-up with urologist as planned  Screening, anemia, deficiency, iron  -     CBC with Auto Differential; Future    Recommendations for Preventive Services Due: see orders and patient instructions/AVS.  Recommended screening schedule for the next 5-10 years is provided to the patient in written form: see Patient Instructions/AVS.     Return in about 1 year (around 5/7/2025) for AWV.     Subjective   The following acute and/or chronic problems were also addressed today:  Has not been in office for over 1 year  Has has kept up to date on seeing urology every 6 months, last PSA undetectable  Otherwise stable, states saw cardio one time but is not planning to return.  He denies any chest pain or significant shortness of breath, any swelling he has is mild and mainly at sock line    Patient's complete Health Risk Assessment and screening values have been reviewed and are found in Flowsheets. The following problems were reviewed today and where indicated follow up appointments were made and/or referrals ordered.    Positive Risk Factor Screenings with Interventions:    Fall Risk:  Do you feel unsteady or are you worried about falling? : (!) yes (Has a cane)  2 or more falls in past year?: (!) yes  Fall with injury in past year?: no     Interventions:    Reviewed medications, home hazards, visual acuity, and co-morbidities that can increase risk for falls  Home safety reviewed      Alcohol

## 2024-10-17 DIAGNOSIS — G45.9 TIA (TRANSIENT ISCHEMIC ATTACK): ICD-10-CM

## 2024-10-17 DIAGNOSIS — E78.5 DYSLIPIDEMIA: ICD-10-CM

## 2024-10-17 RX ORDER — ATORVASTATIN CALCIUM 40 MG/1
40 TABLET, FILM COATED ORAL EVERY EVENING
Qty: 90 TABLET | Refills: 3 | Status: SHIPPED | OUTPATIENT
Start: 2024-10-17

## 2025-01-01 NOTE — H&P
Chel Taylor [address] [place]. [address], [address]     Kalpesh Cheng   1940   [id number]     Ambreen Muniz, APRN - CNP  [address]  [address]  [facility], [address] [facility]     Chief Complaint   Patient presents with    New Patient     Patient states he has an inguinal hernia that he would like to be repaired. History of Present Illness:    Patient is seen in evaluation of an right inguinal hernia . Has been present for 6 months. Some recent increase in size and discomfort. No specific inciting event. Review of Systems   Constitutional: Negative. Respiratory: Negative. Cardiovascular: Negative. Gastrointestinal: Negative. Musculoskeletal: Negative. Neurological: Negative. No Known Allergies     Current Outpatient Medications   Medication Sig Dispense Refill    Calcium Carbonate-Vit D-Min (CALCIUM 1200 PO) Take by mouth      Ginger, Zingiber officinalis, (GINGER ROOT PO) Take by mouth      ibuprofen (ADVIL;MOTRIN) 400 MG tablet Take 1 tablet by mouth 3 times daily 30 tablet 1    Ascorbic Acid (VITAMIN C) 250 MG tablet Take 500 mg by mouth daily      albuterol sulfate HFA (PROAIR HFA) 108 (90 BASE) MCG/ACT inhaler Inhale 2 puffs into the lungs every 6 hours as needed for Wheezing 1 Inhaler 5    b complex vitamins capsule Take 1 capsule by mouth daily      Glucos-Chond-Hyal Ac-Ca Fructo (MOVE FREE JOINT HEALTH ADVANCE) TABS Take by mouth      aspirin 81 MG tablet Take 81 mg by mouth daily      coenzyme Q10 100 MG CAPS capsule Take 100 mg by mouth daily       No current facility-administered medications for this visit.         Past Medical History:   Diagnosis Date    COPD (chronic obstructive pulmonary disease) (HCC)     Diverticulosis     Gastritis     GERD (gastroesophageal reflux disease)     Hyperlipidemia     Multiple lung nodules     Prostate cancer (HCC)     Seasonal allergies abused: Not on file     Physically abused: Not on file     Forced sexual activity: Not on file   Other Topics Concern    Not on file   Social History Narrative    Not on file        Vitals:    07/01/20 1324   BP: 124/68   Site: Left Upper Arm   Position: Sitting   Cuff Size: Medium Adult   Pulse: 66   SpO2: 97%   Weight: 230 lb (104.3 kg)   Height: 6' 1\" (1.854 m)        Physical Exam  Constitutional:       Appearance: He is well-developed. HENT:      Head: Normocephalic and atraumatic. Neck:      Musculoskeletal: Normal range of motion and neck supple. Cardiovascular:      Rate and Rhythm: Normal rate and regular rhythm. Pulmonary:      Effort: Pulmonary effort is normal.      Breath sounds: Normal breath sounds. Abdominal:      General: Bowel sounds are normal.      Palpations: Abdomen is soft. Hernia: A hernia is present. Hernia is present in the right inguinal area. Musculoskeletal: Normal range of motion. Skin:     General: Skin is warm and dry. Neurological:      Mental Status: He is alert and oriented to person, place, and time. Psychiatric:         Behavior: Behavior normal.          Review:     Diagnosis Orders   1. Non-recurrent unilateral inguinal hernia without obstruction or gangrene       Right    Plan: Operative repair of hernia recommended. Laparoscopic, robot assisted vs. Open repair discussed with patient. Has elected to proceed with laparoscopic, robot assisted/possible open repair. Risks, benefits, options and potential complications of procedure were discussed with patient and they agree to proceed.      Electronically signed by Ronda Nuñez MD on 7/1/2020 at 2:04 PM no

## 2025-05-09 ENCOUNTER — HOSPITAL ENCOUNTER (OUTPATIENT)
Age: 85
Setting detail: SPECIMEN
Discharge: HOME | End: 2025-05-09

## 2025-05-09 ENCOUNTER — OFFICE VISIT (OUTPATIENT)
Dept: PRIMARY CARE CLINIC | Age: 85
End: 2025-05-09
Payer: MEDICARE

## 2025-05-09 VITALS
HEART RATE: 71 BPM | BODY MASS INDEX: 31.91 KG/M2 | HEIGHT: 73 IN | OXYGEN SATURATION: 95 % | WEIGHT: 240.8 LBS | SYSTOLIC BLOOD PRESSURE: 136 MMHG | DIASTOLIC BLOOD PRESSURE: 78 MMHG

## 2025-05-09 DIAGNOSIS — M25.562 CHRONIC PAIN OF LEFT KNEE: ICD-10-CM

## 2025-05-09 DIAGNOSIS — E78.5 DYSLIPIDEMIA: ICD-10-CM

## 2025-05-09 DIAGNOSIS — G45.9 TIA (TRANSIENT ISCHEMIC ATTACK): ICD-10-CM

## 2025-05-09 DIAGNOSIS — Z12.5 SCREENING FOR PROSTATE CANCER: ICD-10-CM

## 2025-05-09 DIAGNOSIS — B35.1 FUNGAL TOENAIL INFECTION: ICD-10-CM

## 2025-05-09 DIAGNOSIS — Z00.00 MEDICARE ANNUAL WELLNESS VISIT, SUBSEQUENT: Primary | ICD-10-CM

## 2025-05-09 DIAGNOSIS — R39.15 URINARY URGENCY: ICD-10-CM

## 2025-05-09 DIAGNOSIS — G89.29 CHRONIC PAIN OF LEFT KNEE: ICD-10-CM

## 2025-05-09 DIAGNOSIS — R30.0 BURNING WITH URINATION: ICD-10-CM

## 2025-05-09 DIAGNOSIS — Z85.46 HISTORY OF PROSTATE CANCER: ICD-10-CM

## 2025-05-09 DIAGNOSIS — K21.9 GASTROESOPHAGEAL REFLUX DISEASE, UNSPECIFIED WHETHER ESOPHAGITIS PRESENT: ICD-10-CM

## 2025-05-09 LAB
BILIRUBIN, POC: NORMAL
BLOOD URINE, POC: NORMAL
CLARITY, POC: CLEAR
COLOR, POC: NORMAL
GLUCOSE URINE, POC: NORMAL MG/DL
KETONES, POC: NORMAL MG/DL
LEUKOCYTE EST, POC: NORMAL
NITRITE, POC: NORMAL
PH, POC: 6
PROTEIN, POC: NORMAL MG/DL
SPECIFIC GRAVITY, POC: 1.01
UROBILINOGEN, POC: 0.2 MG/DL

## 2025-05-09 PROCEDURE — 81002 URINALYSIS NONAUTO W/O SCOPE: CPT | Performed by: NURSE PRACTITIONER

## 2025-05-09 PROCEDURE — 1123F ACP DISCUSS/DSCN MKR DOCD: CPT | Performed by: NURSE PRACTITIONER

## 2025-05-09 PROCEDURE — 1160F RVW MEDS BY RX/DR IN RCRD: CPT | Performed by: NURSE PRACTITIONER

## 2025-05-09 PROCEDURE — 1159F MED LIST DOCD IN RCRD: CPT | Performed by: NURSE PRACTITIONER

## 2025-05-09 PROCEDURE — G0439 PPPS, SUBSEQ VISIT: HCPCS | Performed by: NURSE PRACTITIONER

## 2025-05-09 RX ORDER — CLOPIDOGREL BISULFATE 75 MG/1
75 TABLET ORAL DAILY
Qty: 90 TABLET | Refills: 3 | Status: SHIPPED | OUTPATIENT
Start: 2025-05-09

## 2025-05-09 RX ORDER — VIT A/VIT C/VIT E/ZINC/COPPER 4296-226
2 CAPSULE ORAL DAILY
COMMUNITY

## 2025-05-09 RX ORDER — OMEPRAZOLE 20 MG/1
20 CAPSULE, DELAYED RELEASE ORAL DAILY
COMMUNITY
End: 2025-05-09 | Stop reason: ALTCHOICE

## 2025-05-09 RX ORDER — SOLIFENACIN SUCCINATE 10 MG/1
10 TABLET, FILM COATED ORAL DAILY
Qty: 90 TABLET | Refills: 3 | Status: SHIPPED | OUTPATIENT
Start: 2025-05-09

## 2025-05-09 RX ORDER — PANTOPRAZOLE SODIUM 40 MG/1
40 TABLET, DELAYED RELEASE ORAL NIGHTLY
Qty: 90 TABLET | Refills: 3 | Status: SHIPPED | OUTPATIENT
Start: 2025-05-09

## 2025-05-09 SDOH — ECONOMIC STABILITY: FOOD INSECURITY: WITHIN THE PAST 12 MONTHS, YOU WORRIED THAT YOUR FOOD WOULD RUN OUT BEFORE YOU GOT MONEY TO BUY MORE.: NEVER TRUE

## 2025-05-09 SDOH — ECONOMIC STABILITY: FOOD INSECURITY: WITHIN THE PAST 12 MONTHS, THE FOOD YOU BOUGHT JUST DIDN'T LAST AND YOU DIDN'T HAVE MONEY TO GET MORE.: NEVER TRUE

## 2025-05-09 ASSESSMENT — PATIENT HEALTH QUESTIONNAIRE - PHQ9
SUM OF ALL RESPONSES TO PHQ QUESTIONS 1-9: 0
2. FEELING DOWN, DEPRESSED OR HOPELESS: NOT AT ALL
1. LITTLE INTEREST OR PLEASURE IN DOING THINGS: NOT AT ALL
SUM OF ALL RESPONSES TO PHQ QUESTIONS 1-9: 0

## 2025-05-09 ASSESSMENT — LIFESTYLE VARIABLES
HOW MANY STANDARD DRINKS CONTAINING ALCOHOL DO YOU HAVE ON A TYPICAL DAY: 1 OR 2
HOW OFTEN DO YOU HAVE A DRINK CONTAINING ALCOHOL: MONTHLY OR LESS

## 2025-05-09 NOTE — PROGRESS NOTES
Medicare Annual Wellness Visit    Sarthak Caldwell is here for Medicare AWV    Assessment & Plan   Medicare annual wellness visit, subsequent  TIA (transient ischemic attack)-stable with no recurrence, cont plavix  -     clopidogrel (PLAVIX) 75 MG tablet; Take 1 tablet by mouth daily, Disp-90 tablet, R-3Normal  Chronic pain of left knee-offered referral to orthopedics and/for PT but he declines.  Reviewed benefits of oral extra strength as needed.  He will try a compression sleeve style brace.  He is urged to return to office if symptoms worsen  Burning with urination-in office UA appears negative, will send to lab for culture.  Advised to push fluids over weekend  -     POCT Urinalysis no Micro  -     Culture, Urine; Future  Fungal toenail infection-offered referral to podiatry but he declines at this time.  Advised to monitor for changes and notify office if develops any redness, swelling to the toe or pain  Gastroesophageal reflux disease, unspecified whether esophagitis present-stop OTC omeprazole, start pantoprazole nightly, check CBC.  Last EGD 9 years ago.  If symptoms do not improve with different PPI at higher dose will refer to GI for EGD  -     CBC with Auto Differential; Future  -     pantoprazole (PROTONIX) 40 MG tablet; Take 1 tablet by mouth nightly, Disp-90 tablet, R-3Normal  Dyslipidemia-compliant with statin, due for lipid panel  -     Comprehensive Metabolic Panel; Future  -     Lipid Panel; Future  History of prostate cancer-PSA ordered, has had no recurrence, no longer seeing urology  Urinary urgency-refill on Vesicare which helps with symptom management  -     solifenacin (VESICARE) 10 MG tablet; Take 1 tablet by mouth daily, Disp-90 tablet, R-3Normal  Screening for prostate cancer  -     PSA Screening; Future       Return in about 4 weeks (around 6/6/2025) for GERD.     Subjective   The following acute and/or chronic problems were also addressed today:  Has a few concerns:  -has a couple

## 2025-05-10 LAB
MICROORGANISM SPEC CULT: NO GROWTH
SERVICE CMNT-IMP: NORMAL
SPECIMEN DESCRIPTION: NORMAL

## 2025-05-12 ENCOUNTER — RESULTS FOLLOW-UP (OUTPATIENT)
Dept: PRIMARY CARE CLINIC | Age: 85
End: 2025-05-12

## 2025-05-13 ENCOUNTER — HOSPITAL ENCOUNTER (OUTPATIENT)
Age: 85
Setting detail: SPECIMEN
Discharge: HOME | End: 2025-05-13

## 2025-05-13 DIAGNOSIS — Z12.5 SCREENING FOR PROSTATE CANCER: ICD-10-CM

## 2025-05-13 DIAGNOSIS — E78.5 DYSLIPIDEMIA: ICD-10-CM

## 2025-05-13 DIAGNOSIS — K21.9 GASTROESOPHAGEAL REFLUX DISEASE, UNSPECIFIED WHETHER ESOPHAGITIS PRESENT: ICD-10-CM

## 2025-05-13 LAB
ALBUMIN SERPL-MCNC: 4.2 G/DL (ref 3.5–5.2)
ALBUMIN/GLOB SERPL: 1.8 {RATIO} (ref 1–2.5)
ALP SERPL-CCNC: 94 U/L (ref 40–129)
ALT SERPL-CCNC: 16 U/L (ref 10–50)
ANION GAP SERPL CALCULATED.3IONS-SCNC: 9 MMOL/L (ref 9–16)
AST SERPL-CCNC: 20 U/L (ref 10–50)
BASOPHILS # BLD: 0.04 K/UL (ref 0–0.2)
BASOPHILS NFR BLD: 1 % (ref 0–2)
BILIRUB SERPL-MCNC: 0.7 MG/DL (ref 0–1.2)
BUN SERPL-MCNC: 15 MG/DL (ref 8–23)
CALCIUM SERPL-MCNC: 9.1 MG/DL (ref 8.6–10.4)
CHLORIDE SERPL-SCNC: 108 MMOL/L (ref 98–107)
CHOLEST SERPL-MCNC: 117 MG/DL (ref 0–199)
CHOLESTEROL/HDL RATIO: 3
CO2 SERPL-SCNC: 26 MMOL/L (ref 20–31)
CREAT SERPL-MCNC: 1 MG/DL (ref 0.7–1.2)
EOSINOPHIL # BLD: 0.27 K/UL (ref 0–0.44)
EOSINOPHILS RELATIVE PERCENT: 5 % (ref 1–4)
ERYTHROCYTE [DISTWIDTH] IN BLOOD BY AUTOMATED COUNT: 13.3 % (ref 11.8–14.4)
GFR, ESTIMATED: 75 ML/MIN/1.73M2
GLUCOSE SERPL-MCNC: 94 MG/DL (ref 74–99)
HCT VFR BLD AUTO: 41.3 % (ref 40.7–50.3)
HDLC SERPL-MCNC: 39 MG/DL
HGB BLD-MCNC: 13.7 G/DL (ref 13–17)
IMM GRANULOCYTES # BLD AUTO: <0.03 K/UL (ref 0–0.3)
IMM GRANULOCYTES NFR BLD: 0 %
LDLC SERPL CALC-MCNC: 66 MG/DL (ref 0–100)
LYMPHOCYTES NFR BLD: 0.9 K/UL (ref 1.1–3.7)
LYMPHOCYTES RELATIVE PERCENT: 17 % (ref 24–43)
MCH RBC QN AUTO: 30.3 PG (ref 25.2–33.5)
MCHC RBC AUTO-ENTMCNC: 33.2 G/DL (ref 28.4–34.8)
MCV RBC AUTO: 91.4 FL (ref 82.6–102.9)
MONOCYTES NFR BLD: 0.55 K/UL (ref 0.1–1.2)
MONOCYTES NFR BLD: 11 % (ref 3–12)
NEUTROPHILS NFR BLD: 66 % (ref 36–65)
NEUTS SEG NFR BLD: 3.45 K/UL (ref 1.5–8.1)
NRBC BLD-RTO: 0 PER 100 WBC
PLATELET # BLD AUTO: 180 K/UL (ref 138–453)
PMV BLD AUTO: 11.4 FL (ref 8.1–13.5)
POTASSIUM SERPL-SCNC: 4.1 MMOL/L (ref 3.7–5.3)
PROT SERPL-MCNC: 6.6 G/DL (ref 6.6–8.7)
PSA SERPL-MCNC: <0.03 NG/ML (ref 0–4)
RBC # BLD AUTO: 4.52 M/UL (ref 4.21–5.77)
SODIUM SERPL-SCNC: 143 MMOL/L (ref 136–145)
TRIGL SERPL-MCNC: 58 MG/DL
VLDLC SERPL CALC-MCNC: 12 MG/DL (ref 1–30)
WBC OTHER # BLD: 5.2 K/UL (ref 3.5–11.3)

## 2025-06-09 ENCOUNTER — OFFICE VISIT (OUTPATIENT)
Dept: PRIMARY CARE CLINIC | Age: 85
End: 2025-06-09
Payer: MEDICARE

## 2025-06-09 VITALS
DIASTOLIC BLOOD PRESSURE: 74 MMHG | WEIGHT: 242.8 LBS | BODY MASS INDEX: 32.48 KG/M2 | SYSTOLIC BLOOD PRESSURE: 136 MMHG | HEART RATE: 72 BPM | OXYGEN SATURATION: 94 %

## 2025-06-09 DIAGNOSIS — K21.9 GASTROESOPHAGEAL REFLUX DISEASE, UNSPECIFIED WHETHER ESOPHAGITIS PRESENT: Primary | ICD-10-CM

## 2025-06-09 DIAGNOSIS — F10.10 EXCESSIVE DRINKING ALCOHOL: ICD-10-CM

## 2025-06-09 PROCEDURE — 1159F MED LIST DOCD IN RCRD: CPT | Performed by: NURSE PRACTITIONER

## 2025-06-09 PROCEDURE — 1160F RVW MEDS BY RX/DR IN RCRD: CPT | Performed by: NURSE PRACTITIONER

## 2025-06-09 PROCEDURE — 1123F ACP DISCUSS/DSCN MKR DOCD: CPT | Performed by: NURSE PRACTITIONER

## 2025-06-09 PROCEDURE — 99214 OFFICE O/P EST MOD 30 MIN: CPT | Performed by: NURSE PRACTITIONER

## 2025-06-09 ASSESSMENT — ENCOUNTER SYMPTOMS
DIARRHEA: 0
COUGH: 1
ABDOMINAL PAIN: 0
NAUSEA: 0
SHORTNESS OF BREATH: 0
CONSTIPATION: 0
WHEEZING: 0
TROUBLE SWALLOWING: 0
SINUS PRESSURE: 0
VOMITING: 0
SORE THROAT: 0
BLOOD IN STOOL: 0

## 2025-06-09 NOTE — PROGRESS NOTES
12/9/2025) for HLD.     Patient given educational materials - see patient instructions.  Discussed use, benefit, and side effects of prescribed medications.  All patient questions answered. Pt voiced understanding. Reviewed health maintenance.  Instructed to continue current medications, diet and exercise.  Patient agreed with treatment plan. Follow up as directed.     Electronicallysigned by SHAHZAD Frazier CNP on 6/9/2025 at 5:16 PM    The patient (or guardian, if applicable) and other individuals in attendance with the patient were advised that Artificial Intelligence will be utilized during this visit to record, process the conversation to generate a clinical note, and support improvement of the AI technology. The patient (or guardian, if applicable) and other individuals in attendance at the appointment consented to the use of AI, including the recording.

## 2025-06-17 ENCOUNTER — TELEPHONE (OUTPATIENT)
Dept: GASTROENTEROLOGY | Age: 85
End: 2025-06-17

## 2025-07-17 ENCOUNTER — OFFICE VISIT (OUTPATIENT)
Dept: GASTROENTEROLOGY | Age: 85
End: 2025-07-17
Payer: MEDICARE

## 2025-07-17 VITALS
SYSTOLIC BLOOD PRESSURE: 142 MMHG | TEMPERATURE: 97.7 F | WEIGHT: 244 LBS | BODY MASS INDEX: 32.64 KG/M2 | RESPIRATION RATE: 16 BRPM | HEART RATE: 58 BPM | DIASTOLIC BLOOD PRESSURE: 80 MMHG | OXYGEN SATURATION: 97 %

## 2025-07-17 DIAGNOSIS — K57.90 DIVERTICULOSIS: ICD-10-CM

## 2025-07-17 DIAGNOSIS — K21.9 GASTROESOPHAGEAL REFLUX DISEASE, UNSPECIFIED WHETHER ESOPHAGITIS PRESENT: Primary | ICD-10-CM

## 2025-07-17 PROCEDURE — 1160F RVW MEDS BY RX/DR IN RCRD: CPT | Performed by: PHYSICIAN ASSISTANT

## 2025-07-17 PROCEDURE — 99204 OFFICE O/P NEW MOD 45 MIN: CPT | Performed by: PHYSICIAN ASSISTANT

## 2025-07-17 PROCEDURE — 1126F AMNT PAIN NOTED NONE PRSNT: CPT | Performed by: PHYSICIAN ASSISTANT

## 2025-07-17 PROCEDURE — 1159F MED LIST DOCD IN RCRD: CPT | Performed by: PHYSICIAN ASSISTANT

## 2025-07-17 PROCEDURE — 1123F ACP DISCUSS/DSCN MKR DOCD: CPT | Performed by: PHYSICIAN ASSISTANT

## 2025-07-17 ASSESSMENT — ENCOUNTER SYMPTOMS
BLOOD IN STOOL: 0
ANAL BLEEDING: 0
COUGH: 0
SORE THROAT: 0
VOICE CHANGE: 0
DIARRHEA: 0
NAUSEA: 0
RECTAL PAIN: 0
CONSTIPATION: 0
TROUBLE SWALLOWING: 0
CHOKING: 0
ABDOMINAL DISTENTION: 0
VOMITING: 0
WHEEZING: 0
ABDOMINAL PAIN: 0

## 2025-07-17 NOTE — PROGRESS NOTES
GI CLINIC FOLLOW UP    INTERVAL HISTORY:   No referring provider defined for this encounter.    Chief Complaint   Patient presents with    Gastroesophageal Reflux     Referred for GERD       HISTORY OF PRESENT ILLNESS: Mr.Harold YAEL Caldwell is a 84 y.o. male , referred for evaluation of GERD, gastritis, diverticulosis.    New return  Was following for GERD on PPI, last seen 2016. Referred back to us by PCP for dry persistent cough worse with forward flexion. Lots of PND. He is still taking protonix 40mg qd. Severe heartburn if he misses a dose.     Appetite intact. No dysphagia/odynophagia.   No abdominal pain  Regular bowel movements as long as he takes daily metamucil  No straining, rectal pain  No black/bloody stools    Normal LFTs  No anemia    EGD 11/10/2016 - gastritis, duodenal polyp (gastric heterotropia)  Colonoscopy 11/10/2016 - severe diverticulosis    1-2 beers per day  No tobacco, marijuana, other substance use  On plavix      Past Medical,Family, and Social History reviewed and does contribute to the patient presentingcondition.    I did review all the labs results available for the labs which were ordered by the primary care physician, and the other consultants, we search on [facility] and all the available care everywhere epic    I did review all the imaging studies of the abdomen available on EMR, ordered by the primary care physician and the other consultant    I did review all the pathology from the biopsies done on the previous endoscopies    Patient's PMH/PSH,SH,PSYCH Hx, MEDs, ALLERGIES, and ROS were all reviewed and updated in the appropriate sections.    PAST MEDICAL HISTORY:  Past Medical History:   Diagnosis Date    COPD (chronic obstructive pulmonary disease) (HCC)     Diverticulosis     Gastritis     GERD (gastroesophageal reflux disease)     History of thoracentesis 2005    left    Hyperlipidemia     Inguinal hernia 08/03/2020    right    Multiple lung nodules 2005    left

## 2025-07-18 ENCOUNTER — TELEPHONE (OUTPATIENT)
Dept: GASTROENTEROLOGY | Age: 85
End: 2025-07-18

## 2025-07-18 NOTE — TELEPHONE ENCOUNTER
Procedure scheduled/Dr Dinh  Procedure:EGD BRAVO  Dx:Gastroesophageal reflux disease, unspecified whether esophagitis present   Date:12/19/2025  Time:7:30 am / Arrive: 6:30 am  Hospital:[facility] phone call:[facility]  Bowel Prep instructions given:EGD BRAVO- patient is not stopping acid reflux- per provider  In office/via phone:office   Clearance needed:Plavix-PCP  GLP-1: n/a

## (undated) DEVICE — CHLORAPREP 26ML ORANGE

## (undated) DEVICE — TOWEL,OR,DSP,ST,NATURAL,DLX,4/PK,20PK/CS: Brand: MEDLINE

## (undated) DEVICE — GLOVE SURG SZ 75 CRM LTX FREE POLYISOPRENE POLYMER BEAD ANTI

## (undated) DEVICE — SUTURE DEV SZ 2-0 WND CLSR ABSRB GS-22 VLOC COVIDIEN VLOCM2145

## (undated) DEVICE — TIP COVER ACCESSORY

## (undated) DEVICE — COLUMN DRAPE

## (undated) DEVICE — GOWN,AURORA,NONRNF,XL,30/CS: Brand: MEDLINE

## (undated) DEVICE — BLANKET WRM W29.9XL79.1IN UP BODY FORC AIR MISTRAL-AIR

## (undated) DEVICE — SOLUTION IV IRRIG POUR BRL 0.9% SODIUM CHL 2F7124

## (undated) DEVICE — SUTURE VCRL SZ 4-0 L18IN ABSRB UD L19MM PS-2 3/8 CIR PRIM J496H

## (undated) DEVICE — MHPB BASIC LAP PACK: Brand: MEDLINE INDUSTRIES, INC.

## (undated) DEVICE — SOLUTION ANTIFOG VIS SYS CLEARIFY LAPSCP

## (undated) DEVICE — ARM DRAPE

## (undated) DEVICE — ELECTRODE PT RET AD L9FT HI MOIST COND ADH HYDRGEL CORDED

## (undated) DEVICE — PROTECTOR ULN NRV PUR FOAM HK LOOP STRP ANATOMICALLY

## (undated) DEVICE — STRAP,POSITIONING,KNEE/BODY,FOAM,4X60": Brand: MEDLINE

## (undated) DEVICE — BLADELESS OBTURATOR: Brand: WECK VISTA

## (undated) DEVICE — DRAPE,LAP,CHOLE,W/TROUGHS,STERILE: Brand: MEDLINE

## (undated) DEVICE — GLOVE SURG SZ 55 THK91MIL ORANGE  LTX FREE SYN POLYISOPRENE

## (undated) DEVICE — CANNULA SEAL

## (undated) DEVICE — PENCIL ES L3M BTTN SWCH HOLSTER W/ BLDE ELECTRD EDGE

## (undated) DEVICE — 4-PORT MANIFOLD: Brand: NEPTUNE 2